# Patient Record
Sex: FEMALE | Race: WHITE | NOT HISPANIC OR LATINO | Employment: OTHER | URBAN - NONMETROPOLITAN AREA
[De-identification: names, ages, dates, MRNs, and addresses within clinical notes are randomized per-mention and may not be internally consistent; named-entity substitution may affect disease eponyms.]

---

## 2017-01-10 ENCOUNTER — HISTORY (OUTPATIENT)
Dept: EMERGENCY MEDICINE | Facility: OTHER | Age: 42
End: 2017-01-10

## 2018-03-01 ENCOUNTER — DOCUMENTATION ONLY (OUTPATIENT)
Dept: FAMILY MEDICINE | Facility: OTHER | Age: 43
End: 2018-03-01

## 2018-03-01 RX ORDER — LEVOTHYROXINE SODIUM 75 UG/1
75 TABLET ORAL
COMMUNITY
Start: 2016-11-04 | End: 2023-11-08

## 2018-03-01 RX ORDER — OMEPRAZOLE 40 MG/1
40 CAPSULE, DELAYED RELEASE ORAL 2 TIMES DAILY
COMMUNITY
Start: 2016-11-04 | End: 2023-11-08

## 2018-04-30 ENCOUNTER — OFFICE VISIT (OUTPATIENT)
Dept: FAMILY MEDICINE | Facility: OTHER | Age: 43
End: 2018-04-30
Attending: PHYSICIAN ASSISTANT
Payer: OTHER GOVERNMENT

## 2018-04-30 VITALS — SYSTOLIC BLOOD PRESSURE: 118 MMHG | DIASTOLIC BLOOD PRESSURE: 86 MMHG | HEART RATE: 69 BPM | OXYGEN SATURATION: 100 %

## 2018-04-30 DIAGNOSIS — R22.9 LOCALIZED SUPERFICIAL SWELLING, MASS, OR LUMP: Primary | ICD-10-CM

## 2018-04-30 PROCEDURE — 99213 OFFICE O/P EST LOW 20 MIN: CPT | Performed by: PHYSICIAN ASSISTANT

## 2018-04-30 RX ORDER — ZOLEDRONIC ACID 5 MG/100ML
5 INJECTION, SOLUTION INTRAVENOUS
Status: ON HOLD | COMMUNITY
End: 2023-11-09

## 2018-04-30 NOTE — PATIENT INSTRUCTIONS
Small 0.5 cm superficial cyst or nodule on volar side of left arm  This does not appear infectious and is asymptomatic  Suggest follow up with PCP for further evaluation if this persists or gets worse  Seek immediate care for redness, warmth, pain, drainage, worsening of symptoms or fever

## 2018-04-30 NOTE — NURSING NOTE
Patient present to clinic today with a small lump under her skin on her left upper arm. It does not hurt. It has been there for a couple of weeks.  [place] CMA..............4/30/2018........3:17 PM

## 2018-04-30 NOTE — MR AVS SNAPSHOT
"              After Visit Summary   4/30/2018    Sara Liriano    MRN: [id number]           Patient Information     Date Of Birth          1975        Visit Information        Provider Department      4/30/2018 3:00 PM Batsheva Fletcher PA [facility]        Today's Diagnoses     Localized superficial swelling, mass, or lump    -  1      Care Instructions    Small 0.5 cm superficial cyst or nodule on volar side of left arm  This does not appear infectious and is asymptomatic  Suggest follow up with PCP for further evaluation if this persists or gets worse  Seek immediate care for redness, warmth, pain, drainage, worsening of symptoms or fever            Follow-ups after your visit        Follow-up notes from your care team     Return if symptoms worsen or fail to improve.      Who to contact     If you have questions or need follow up information about today's clinic visit or your schedule please contact [facility] directly at [phone].  Normal or non-critical lab and imaging results will be communicated to you by [employer]fl[employer], letter or phone within 4 business days after the clinic has received the results. If you do not hear from us within 7 days, please contact the clinic through [employer] or phone. If you have a critical or abnormal lab result, we will notify you by phone as soon as possible.  Submit refill requests through Yippy or call your pharmacy and they will forward the refill request to us. Please allow 3 business days for your refill to be completed.          Additional Information About Your Visit        American[employer]art Information     Yippy lets you send messages to your doctor, view your test results, renew your prescriptions, schedule appointments and more. To sign up, go to [email or web address] . Click on \"Log in\" on the left side of the screen, which will take you to the Welcome page. Then click on \"Sign up Now\" on the right side of the page.     You " will be asked to enter the access code listed below, as well as some personal information. Please follow the directions to create your username and password.     Your access code is: [email or web address]  Expires: 2018  4:04 PM     Your access code will  in 90 days. If you need help or a new code, please call your [facility] or [phone].        Care EveryWhere ID     This is your Care EveryWhere ID. This could be used by other organizations to access your [place] medical records  [id number]        Your Vitals Were     Pulse Pulse Oximetry Breastfeeding?             69 100% No          Blood Pressure from Last 3 Encounters:   18 118/86    Weight from Last 3 Encounters:   No data found for Wt              Today, you had the following     No orders found for display       Primary Care Provider Fax #    Physician No Ref-Primary [phone]       No address on file        Equal Access to Services     [facility]: Hadii errol Jack, waaxda rodrigo, qaybta kaalmada taqueria, alicia yeung . So [facility] [phone].    ATENCIÓN: Si habla español, tiene a fine disposición servicios gratuitos de asistencia lingüística. Llame al [phone].    We comply with applicable federal civil rights laws and [place] laws. We do not discriminate on the basis of race, color, national origin, age, disability, sex, sexual orientation, or gender identity.            Thank you!     Thank you for choosing [facility] hospitals  for your care. Our goal is always to provide you with excellent care. Hearing back from our patients is one way we can continue to improve our services. Please take a few minutes to complete the written survey that you may receive in the mail after your visit with us. Thank you!             Your Updated Medication List - Protect others around you: Learn how to safely use, store and throw away your medicines at www.disposemymeds.org.          This  list is accurate as of 4/30/18  4:04 PM.  Always use your most recent med list.                   Brand Name Dispense Instructions for use Diagnosis    levothyroxine 75 MCG tablet    SYNTHROID/LEVOTHROID     Take 75 mcg by mouth every morning (before breakfast)        MULTIPLE VITAMIN PO           omeprazole 40 MG capsule    priLOSEC     Take 40 mg by mouth 2 times daily        zoledronic Acid 5 MG/100ML Soln infusion    RECLAST     Inject 5 mg into the vein

## 2018-04-30 NOTE — PROGRESS NOTES
HPI:    Sara Liriano is a 43 year old female  who presents to clinic today for evaluation of a small superficial bump on her left arm. She first noticed this about 2 weeks ago. It is not painful or symptomatic in any way. She has a history of cysts. She has a cluster of them on her left breast and has had several biopsied and all were benign.     She does her medical treatment in the [facility]. She has her next yearly appointment in September.   She feels well, denies fever, sweats, chills. Eating and drinking normally, denies unintentional weight loss.     History reviewed. No pertinent past medical history.  History reviewed. No pertinent surgical history.  Social History   Substance Use Topics     Smoking status: Never Smoker     Smokeless tobacco: Never Used     Alcohol use Yes      Comment: Alcoholic Drinks/day: Occassional     Current Outpatient Prescriptions   Medication Sig Dispense Refill     levothyroxine (SYNTHROID/LEVOTHROID) 75 MCG tablet Take 75 mcg by mouth every morning (before breakfast)       MULTIPLE VITAMIN PO        omeprazole (PRILOSEC) 40 MG capsule Take 40 mg by mouth 2 times daily       zoledronic Acid (RECLAST) 5 MG/100ML SOLN infusion Inject 5 mg into the vein       Allergies   Allergen Reactions     Fluoxetine Other (See Comments)     Blurred vision, heart racing, face swelling         Past medical history, past surgical history, current medications and allergies reviewed and accurate to the best of my knowledge.        ROS:  Refer to HPI    /86 (BP Location: Right arm, Patient Position: Sitting, Cuff Size: Child)  Pulse 69  LMP   SpO2 100%  Breastfeeding? No    EXAM:  General Appearance: Well appearing female, appropriate appearance for age. No acute distress  Musculoskeletal:  Left arm exam: Inspection: on volar aspect of arm there is a small nodule. Palpation: nodule is 0.5 cm, moveable, and superficial. No warmth or tenderness.   Neurovascular: normal pulses, sensation to  soft touch  ROM: normal left extremity  Dermatological: no rashes noted of exposed skin  Psychological: normal affect, alert and pleasant      ASSESSMENT/PLAN:  (R22.9) Localized superficial swelling, mass, or lump  (primary encounter diagnosis)      PLAN:  Small 0.5 cm superficial cyst or nodule on volar side of left arm  This does not appear infectious and is asymptomatic  Suggest follow up with PCP for further evaluation if this persists or gets worse  Seek immediate care for redness, warmth, pain, drainage, worsening of symptoms or fever    WARREN Barraza on 4/30/2018 at 4:53 PM

## 2018-05-15 ENCOUNTER — HOSPITAL ENCOUNTER (EMERGENCY)
Facility: OTHER | Age: 43
Discharge: ANOTHER HOSPITAL | End: 2018-05-16
Attending: FAMILY MEDICINE | Admitting: FAMILY MEDICINE
Payer: OTHER GOVERNMENT

## 2018-05-15 DIAGNOSIS — S06.33AA SKULL FRACTURE WITH CEREBRAL CONTUSION, CLOSED, INITIAL ENCOUNTER (H): ICD-10-CM

## 2018-05-15 DIAGNOSIS — R64 CACHECTIC (H): ICD-10-CM

## 2018-05-15 DIAGNOSIS — S02.91XA SKULL FRACTURE WITH CEREBRAL CONTUSION, CLOSED, INITIAL ENCOUNTER (H): ICD-10-CM

## 2018-05-15 DIAGNOSIS — I60.9 SUBARACHNOID HEMORRHAGE (H): ICD-10-CM

## 2018-05-15 DIAGNOSIS — E87.1 HYPONATREMIA: ICD-10-CM

## 2018-05-15 DIAGNOSIS — S09.90XA CLOSED HEAD INJURY, INITIAL ENCOUNTER: ICD-10-CM

## 2018-05-15 DIAGNOSIS — E87.6 HYPOKALEMIA: ICD-10-CM

## 2018-05-15 DIAGNOSIS — E43 SEVERE MALNUTRITION (H): ICD-10-CM

## 2018-05-15 PROCEDURE — 25000128 H RX IP 250 OP 636: Performed by: FAMILY MEDICINE

## 2018-05-15 PROCEDURE — 99291 CRITICAL CARE FIRST HOUR: CPT | Mod: Z6 | Performed by: FAMILY MEDICINE

## 2018-05-15 PROCEDURE — 96376 TX/PRO/DX INJ SAME DRUG ADON: CPT | Performed by: FAMILY MEDICINE

## 2018-05-15 PROCEDURE — 93005 ELECTROCARDIOGRAM TRACING: CPT | Performed by: FAMILY MEDICINE

## 2018-05-15 PROCEDURE — 96375 TX/PRO/DX INJ NEW DRUG ADDON: CPT | Performed by: FAMILY MEDICINE

## 2018-05-15 PROCEDURE — 99291 CRITICAL CARE FIRST HOUR: CPT | Mod: 25 | Performed by: FAMILY MEDICINE

## 2018-05-15 PROCEDURE — 93005 ELECTROCARDIOGRAM TRACING: CPT | Mod: 76 | Performed by: FAMILY MEDICINE

## 2018-05-15 PROCEDURE — 96365 THER/PROPH/DIAG IV INF INIT: CPT | Performed by: FAMILY MEDICINE

## 2018-05-15 RX ADMIN — ROCURONIUM BROMIDE 50 MG: 10 INJECTION, SOLUTION INTRAVENOUS at 23:57

## 2018-05-15 RX ADMIN — SODIUM CHLORIDE: 900 INJECTION, SOLUTION INTRAVENOUS at 23:54

## 2018-05-15 RX ADMIN — MIDAZOLAM 4 MG: 1 INJECTION INTRAMUSCULAR; INTRAVENOUS at 23:55

## 2018-05-15 NOTE — ED AVS SNAPSHOT
Sara Welch #[id number] (CSN: [id number])  (43 year old F)  (Adm: 05/15/18)     [id number]               [facility]: [phone]            Patient Demographics     Patient Name Sex          Age SSN Address Phone    Sara Welch Female 1975 (43 year old) [id number] [facility] [id number] [phone] (Home)      PCP and Center    Primary Care Provider  Phone Center     No primary care provider on file. None         Emergency Contact(s)     None on File      Admission Information     Attending Provider Admitting Provider Admission Type Admission Date/Time    Camron Gerardo MD  Emergency 05/15/18  5989    Discharge Date Hospital Service Auth/Cert Status Service Area     Emergency Medicine [facility]    Unit Room/Bed Admission Status        EMERGENCY DEPT ED02/ED02 Admission (Confirmed)       Admission     Complaint    None      Hospital Account     Name Acct ID Class Status Primary Coverage    Sara Welch [id number] Emergency Open None            Guarantor Account (for Hospital Account #[id number])     Name Relation to Pt Service Area Active? Acct Type    Sara Welch Self FCS Yes Personal/Family    Address Phone          [address] [place] [address] [phone](H)              Coverage Information (for Hospital Account #[id number])     Not on file                                                INTERAGENCY TRANSFER FORM - PHYSICIAN ORDERS   5/15/2018                       [facility]: [phone]            Attending Provider: Camron Gerardo MD     Allergies:  Not on File    Infection:  None   Service:  EMERGENCY ME    Ht:  --   Wt:  --   Admission Wt:  --    BMI:  --   BSA:  --            ED Clinical Impression     Diagnosis Description Comment Added By Time Added    Closed head injury, initial encounter [S09.90XA] Closed head injury, initial encounter [S09.90XA]  Camron Gerardo MD  5/16/2018 12:43 AM      Hospital Problems as of 5/16/2018     None      Non-Hospital Problems as of 5/16/2018     None      Code Status History     This patient does not have a recorded code status. Please follow your organizational policy for patients in this situation.      Current Code Status     This patient does not have a recorded code status. Please follow your organizational policy for patients in this situation.         Medication Review      Notice     You have not been prescribed any medications.                                                INTERAGENCY TRANSFER FORM - NURSING   5/15/2018                       [facility]: [phone]            Attending Provider: Camron Gerardo MD     Allergies:  Not on File    Infection:  None   Service:  EMERGENCY ME    Ht:  --   Wt:  --   Admission Wt:  --    BMI:  --   BSA:  --            Advance Directives        Scanned docmt in ACP Activity?                   Immunizations     None      ASSESSMENT     Discharge Profile Flowsheet     COMMUNICATION ASSESSMENT     Patient's communication style  -- (unable to speak) 05/15/18 2350            Vitals     Vital Signs Flowsheet     VITAL SIGNS     POINT OF CARE TESTING      Temp  96.6  F (35.9  C) 05/15/18 2354   Puncture Site  fingertip 05/16/18 0018    Temp src  Tympanic 05/15/18 2354   Bedside Glucose (mg/dl )   196 mg/dl 05/16/18 0018    Resp  10 05/16/18 0037   RESPIRATORY MONITORING      Heart Rate  114 05/16/18 0037   Respiratory Monitoring (EtCO2)  37 mmHg 05/16/18 0037    Pulse/Heart Rate Source  Monitor 05/15/18 2354   MIRNA COMA SCALE      BP  (!)  129/108 05/16/18 0037   Best Eye Response  3-->(E3) to speech 05/15/18 2354    OXYGEN THERAPY     Best Motor Response  5-->(M5) localizes pain 05/15/18 2354    SpO2  100 % 05/16/18 0037   Best Verbal Response  2-->(V2) incomprehensible speech 05/15/18 2354    PAIN/COMFORT     Russell Coma Scale Score  10 05/15/18 2354    FACES  Pain Rating  0-->no hurt 05/15/18 2354                 Patient Lines/Drains/Airways Status    Active LINES/DRAINS/AIRWAYS     Name: Placement date: Placement time: Site: Days: Last dressing change:    Urethral Catheter 16 fr 18   0010      less than 1     Peripheral IV 18 Left Lower forearm 18   0001   Lower forearm   less than 1     Peripheral IV 18 Right 18   0001      less than 1             Patient Lines/Drains/Airways Status    Active PICC/CVC     None            Intake/Output Detail Report     None      Case Management/Discharge Planning     Case Management/Discharge Planning Flowsheet     ABUSE RISK SCREEN     QUESTION TO PATIENT: Do you feel safe going back to the place where you are living?  patient declined to answer or is unable to answer 05/15/18 2357    QUESTION TO PATIENT:  Has a member of your family or a partner(now or in the past) intimidated, hurt, manipulated, or controlled you in any way?  patient declined to answer or is unable to answer 05/15/18 2357                       [facility]: [phone]            Medication Administration Report for Sara Welch as of 18   Legend:    Given Hold Not Given Due Canceled Entry Other Actions    Time Time (Time) Time  Time-Action       Inactive    Active    Linked        Medications 05/10/18 05/11/18 05/12/18 05/13/18 05/14/18 05/15/18 05/16/18    levETIRAcetam (KEPPRA) 1,000 mg in sodium chloride 0.9 % 100 mL intermittent infusion  Dose: 1,000 mg  Freq: ONCE Route: IV  Start: 18   Admin. Amount: 1,000 mg  Infused Over: 15 Minutes  Administrations Remainin  Volume: 100 mL   Mixture Administration Information:   Medication Type Amount   levETIRAcetam 500 MG/5ML SOLN Medications 1,000 mg   sodium chloride 0.9 % SOLN Base 100 mL                   [ ] 44           midazolam (VERSED) injection 4 mg  Dose: 4 mg  Freq: EVERY 20 MIN PRN Route: IV  PRN Reasons: anxiety,sedation  PRN  Comment: seizures  Start: 05/16/18 0010   Admin Instructions: For ordered doses up to 2.5 mg give IV Push slowly titrated over a minimum of 2 minutes. Dilute each 1mg in 4mL of NS.    Admin. Amount: 4 mg = 4 mL Conc: 1 mg/mL  Last Admin: 05/16/18 0011  Dispense Loc: EMERGENCY DEPARTMENT  Infused Over: 2 Minutes  Volume: 4 mL          2355 (4 mg)-Given        0011 (4 mg)-Given [C]           propofol (DIPRIVAN) infusion  Rate: 1.5-22.5 mL/hr Dose: 5-75 mcg/kg/min  Weight Dosing Info: 50 kg (Order-Specific)  Freq: CONTINUOUS Route: IV  Last Dose: Stopped (05/16/18 0044)  Start: 05/16/18 0020   Admin Instructions: For sedation of mechanically ventilated patients.  For range orders: start at lowest dose ordered. Titrate by 5-10 mcg/kg/min every 5 minutes to achieve the defined goal sedation score. If ordered, consider using bolus doses of propofol for acute agitation before titrating infusion.    Order specific questions:  Population for use? Adult Sedation  Goal Lopez Agitation Sedation Scale (RASS) Score Goal Range -3 Moderate Sedation to -4 Deep Sedation     Admin. Amount: 250-3,750 mcg/min  Last Admin: 05/16/18 0039  Dispense Loc: EMERGENCY DEPARTMENT  Volume: 100 mL   Current Line: Peripheral IV 05/16/18 Left Lower forearm          0021 (5 mcg/kg/min)-New Bag       0039 (10 mcg/kg/min)-New Bag [C]       0044-ED Infusing on Admission/transfer           sodium chloride 0.9% infusion  Rate: 125 mL/hr   Freq: CONTINUOUS Route: IV  Last Dose: Stopped (05/16/18 0043)  Start: 05/16/18 0007   Last Admin: 05/15/18 2354  Dispense Loc: EMERGENCY DEPARTMENT  Volume: 1,000 mL   Current Line: Peripheral IV 05/16/18 Right         2354 ( )-New Bag        0043-ED Infusing on Admission/transfer          Completed Medications  Medications 05/10/18 05/11/18 05/12/18 05/13/18 05/14/18 05/15/18 05/16/18         Dose: 50 mL  Freq: ONCE Route: IV  Start: 05/16/18 0014   End: 05/16/18 0003   Admin Instructions: Vesicant. For ordered  doses up to 25 g, give IV Push undiluted. Give each 5g over 1 minute.    Admin. Amount: 50 mL  Last Admin: 18  Dispense Loc: EMERGENCY DEPARTMENT  Infused Over: 1-5 Minutes  Administrations Remainin  Volume: 50 mL           0002 (50 mL)-Given             Dose: 25 mcg  Freq: ONCE Route: IV  Start: 18   End: 18   Admin Instructions: For ordered doses up to 100 mcg give IV Push undiluted over a minimum of 3-5 minutes.    Admin. Amount: 25 mcg = 0.5 mL Conc: 50 mcg/mL  Last Admin: 18  Dispense Loc: EMERGENCY DEPARTMENT  Administrations Remainin  Volume: 2 mL           8 (25 mcg)-Given             Dose: 50 mg  Freq: ONCE Route: IV  Start: 18   End: 05/15/18 2357   Admin. Amount: 50 mg = 5 mL Conc: 10 mg/mL  Last Admin: 05/15/18 2357  Dispense Loc: [facility]  Administrations Remainin  Volume: 5 mL           (50 mg)-Given                     INTERAGENCY TRANSFER FORM - NOTES (H&P, Discharge Summary, Consults, Procedures, Therapies)   5/15/2018                       [facility]: [phone]            History & Physicals     No notes of this type exist for this encounter.      Discharge Summaries     No notes of this type exist for this encounter.      Consult Notes     No notes of this type exist for this encounter.      Progress Notes - Physician (Notes for yesterday and today)     No notes of this type exist for this encounter.      Procedure Notes     No notes of this type exist for this encounter.      Progress Notes - Therapies (Notes from 18 through 18)     No notes of this type exist for this encounter.                                          INTERAGENCY TRANSFER FORM - LAB / IMAGING / EKG / EMG RESULTS   5/15/2018                       [facility]: [phone]            Unresulted Labs (24h ago through future)    Start       Ordered    18 0006  HCG qualitative urine  (LAB  HCG (Human Chorionic Gonadotropin) Pregnancy Testing PANEL)  STAT,   STAT      05/16/18 0006 05/16/18 0006  UA reflex to Microscopic and Culture  (LAB Urine Testing ADULT PANEL - GH)  ROUTINE,   Routine      05/16/18 0006 05/16/18 0006  Drug of Abuse Screen Urine GH  (Drug of Abuse Screen Urine - GH)  STAT,   STAT      05/16/18 0006         Lab Results - 3 Days      Lactic acid [[id number]] (Abnormal)  Resulted: 05/16/18 0033, Result status: Final result    Ordering provider: Camron Tirado MD  05/16/18 0006 Resulting lab: [facility]    Specimen Information    Type Source Collected On   Blood  05/16/18 0005          Components       Value Reference Range Flag Lab   Lactic Acid 3.3 0.5 - 2.2 mmol/L HH [facility]   Comment:  Critical Value called to and read back by  DR. TIRADO 5.16.18 00:30 MW.              Comprehensive metabolic panel [[id number]] (Abnormal)  Resulted: 05/16/18 0031, Result status: Final result    Ordering provider: Camron Tirado MD  05/16/18 0006 Resulting lab: [facility]    Specimen Information    Type Source Collected On   Blood  05/16/18 0005          Components       Value Reference Range Flag Lab   Sodium 122 134 - 144 mmol/L L [facility]   Potassium 3.1 3.5 - 5.1 mmol/L L [facility]   Chloride 93 98 - 107 mmol/L L [facility]   Carbon Dioxide 21 21 - 31 mmol/L  [facility]   Anion Gap 8 3 - 14 mmol/L  [facility]   Glucose 453 70 - 105 mg/dL H [facility]   Urea Nitrogen 5 7 - 25 mg/dL L [facility]   Creatinine 0.47 0.60 - 1.20 mg/dL L [facility]   GFR Estimate >90 >60 mL/min/1.7m2  [facility]   GFR Estimate If Black >90 >60 mL/min/1.7m2  [facility]   Calcium 7.0 8.6 - 10.3 mg/dL L [facility]   Bilirubin Total 0.5 0.3 - 1.0 mg/dL  [facility]   Albumin 2.3 3.5 - 5.7 g/dL L [facility]   Protein Total 4.1 6.4 - 8.9 g/dL L [facility]   Alkaline Phosphatase 63 34 - 104 U/L  [facility]   ALT 52 7 - 52 U/L  [facility]   AST 71 13 - 39 U/L H  [facility]            Ethanol GH [[id number]]  Resulted: 05/16/18 0031, Result status: Final result    Ordering provider: Camron Gerardo MD  05/16/18 0006 Resulting lab: [facility]    Specimen Information    Type Source Collected On   Blood  05/16/18 0005          Components       Value Reference Range Flag Lab   Ethanol g/dL <0.01 <0.01 %  [facility]            Acetaminophen GH [[id number]]  Resulted: 05/16/18 0031, Result status: Final result    Ordering provider: Camron Gerardo MD  05/16/18 0006 Resulting lab: [facility] [place]    Specimen Information    Type Source Collected On   Blood  05/16/18 0005          Components       Value Reference Range Flag Lab   Acetaminophen <0.2 0.0 - 30.0 ug/mL  [facility]            Salicylate level [[id number]] (Abnormal)  Resulted: 05/16/18 0031, Result status: Final result    Ordering provider: Camron Gerardo MD  05/16/18 0006 Resulting lab: [facility] [place]    Specimen Information    Type Source Collected On   Blood  05/16/18 0005          Components       Value Reference Range Flag Lab   Salicylate Level <0 15 - 30 mg/dL L [facility]   Comment:  Therapeutic:        <20  Anti inflammatory:  15-30              Troponin I [[id number]]  Resulted: 05/16/18 0029, Result status: Final result    Ordering provider: Camron Gerardo MD  05/16/18 0006 Resulting lab: [facility]    Specimen Information    Type Source Collected On   Blood  05/16/18 0005          Components       Value Reference Range Flag Lab   Troponin I ES <0.030 0.000 - 0.034 ug/L  [facility]            Blood gas arterial and oxyhgb [[id number]] (Abnormal)  Resulted: 05/16/18 0019, Result status: Final result    Ordering provider: Camron Gerardo MD  05/16/18 0014 Resulting lab: [facility] [place]    Specimen Information    Type Source Collected On   Blood  05/16/18 0005          Components       Value  Reference Range Flag Lab   pH Arterial 7.43 7.35 - 7.45 pH  [facility]   pCO2 Arterial 29 35 - 45 mm Hg L [facility]   pO2 Arterial 480 83 - 108 mm Hg H [facility]   Bicarbonate Arterial 19 22 - 28 mmol/L L [facility]   FIO2 100   [facility]   Oxyhemoglobin Arterial 97 >95 %  [facility]            INR [[id number]]  Resulted: 05/16/18 0017, Result status: Final result    Ordering provider: Camron Gerardo MD  05/16/18 0006 Resulting lab: [facility]    Specimen Information    Type Source Collected On   Blood  05/16/18 0005          Components       Value Reference Range Flag Lab   INR 1.13 0 - 1.3  [facility]            CBC with platelets differential [[id number]] (Abnormal)  Resulted: 05/16/18 0015, Result status: Final result    Ordering provider: Camron Gerardo MD  05/16/18 0006 Resulting lab: [facility]    Specimen Information    Type Source Collected On   Blood  05/16/18 0005          Components       Value Reference Range Flag Lab   WBC 3.7 4.0 - 11.0 10e9/L L [facility]   RBC Count 2.80 3.8 - 5.2 10e12/L L [facility]   Hemoglobin 10.3 11.7 - 15.7 g/dL L [facility]   Hematocrit 27.9 35.0 - 47.0 % L [facility]    78 - 100 fl  [facility]   MCH 36.8 26.5 - 33.0 pg H [facility]   MCHC 36.9 31.5 - 36.5 g/dL H [facility]   RDW 11.9 10.0 - 15.0 %  [facility]   Platelet Count 162 150 - 450 10e9/L  [facility]   Diff Method Automated Method   [facility]   % Neutrophils 43.8 %  [facility]   % Lymphocytes 48.4 %  [facility]   % Monocytes 4.3 %  [facility]   % Eosinophils 0.3 %  [facility]   % Basophils 1.1 %  [facility]   % Immature Granulocytes 2.1 %  [facility]   Absolute Neutrophil 1.6 1.6 - 8.3 10e9/L  [facility]   Absolute Lymphocytes 1.8 0.8 - 5.3 10e9/L  [facility]   Absolute Monocytes 0.2 0.0 - 1.3 10e9/L  [facility]   Absolute Eosinophils 0.0 0.0 - 0.7 10e9/L  [facility]   Absolute Basophils 0.0 0.0 - 0.2 10e9/L  [facility]   Abs Immature Granulocytes  0.1 0 - 0.4 10e9/L  [facility]            Testing Performed By     Lab - Abbreviation Name Director Address Valid Date Range    8174 - [facility] \[facility]\"" Unknown [address]  [facility] [address] 08/07/15 0948 - Present            Encounter-Level Documents:     There are no encounter-level documents.      Order-Level Documents:     There are no order-level documents.

## 2018-05-16 ENCOUNTER — APPOINTMENT (OUTPATIENT)
Dept: GENERAL RADIOLOGY | Facility: OTHER | Age: 43
End: 2018-05-16
Attending: FAMILY MEDICINE
Payer: OTHER GOVERNMENT

## 2018-05-16 ENCOUNTER — APPOINTMENT (OUTPATIENT)
Dept: CT IMAGING | Facility: OTHER | Age: 43
End: 2018-05-16
Attending: FAMILY MEDICINE
Payer: OTHER GOVERNMENT

## 2018-05-16 ENCOUNTER — TRANSFERRED RECORDS (OUTPATIENT)
Dept: HEALTH INFORMATION MANAGEMENT | Facility: OTHER | Age: 43
End: 2018-05-16

## 2018-05-16 VITALS
RESPIRATION RATE: 10 BRPM | DIASTOLIC BLOOD PRESSURE: 82 MMHG | SYSTOLIC BLOOD PRESSURE: 103 MMHG | OXYGEN SATURATION: 100 % | TEMPERATURE: 96.6 F

## 2018-05-16 DIAGNOSIS — Z53.9 DIAGNOSIS NOT YET DEFINED: Primary | ICD-10-CM

## 2018-05-16 LAB
ABO + RH BLD: NORMAL
ABO + RH BLD: NORMAL
ALBUMIN SERPL-MCNC: 2.3 G/DL (ref 3.5–5.7)
ALBUMIN UR-MCNC: ABNORMAL MG/DL
ALP SERPL-CCNC: 63 U/L (ref 34–104)
ALT SERPL W P-5'-P-CCNC: 52 U/L (ref 7–52)
AMORPH CRY #/AREA URNS HPF: ABNORMAL /HPF
AMPHETAMINES UR QL SCN: NOT DETECTED
ANION GAP SERPL CALCULATED.3IONS-SCNC: 8 MMOL/L (ref 3–14)
APAP SERPL-MCNC: <0.2 UG/ML (ref 0–30)
APPEARANCE UR: CLEAR
AST SERPL W P-5'-P-CCNC: 71 U/L (ref 13–39)
BACTERIA #/AREA URNS HPF: ABNORMAL /HPF
BARBITURATES UR QL: NOT DETECTED
BASOPHILS # BLD AUTO: 0 10E9/L (ref 0–0.2)
BASOPHILS NFR BLD AUTO: 1.1 %
BENZODIAZ UR QL: ABNORMAL
BILIRUB SERPL-MCNC: 0.5 MG/DL (ref 0.3–1)
BILIRUB UR QL STRIP: NEGATIVE
BLD GP AB SCN SERPL QL: NORMAL
BLOOD BANK CMNT PATIENT-IMP: NORMAL
BUN SERPL-MCNC: 5 MG/DL (ref 7–25)
BUPRENORPHINE UR QL: NOT DETECTED NG/ML
CALCIUM SERPL-MCNC: 7 MG/DL (ref 8.6–10.3)
CANNABINOIDS UR QL: NOT DETECTED NG/ML
CHLORIDE SERPL-SCNC: 93 MMOL/L (ref 98–107)
CO2 SERPL-SCNC: 21 MMOL/L (ref 21–31)
COCAINE UR QL: NOT DETECTED
COLOR UR AUTO: YELLOW
CREAT SERPL-MCNC: 0.47 MG/DL (ref 0.6–1.2)
D-METHAMPHET UR QL: NOT DETECTED NG/ML
DIFFERENTIAL METHOD BLD: ABNORMAL
EOSINOPHIL # BLD AUTO: 0 10E9/L (ref 0–0.7)
EOSINOPHIL NFR BLD AUTO: 0.3 %
ERYTHROCYTE [DISTWIDTH] IN BLOOD BY AUTOMATED COUNT: 11.9 % (ref 10–15)
ETHANOL SERPL-MCNC: <0.01 %
GFR SERPL CREATININE-BSD FRML MDRD: >90 ML/MIN/1.7M2
GLUCOSE SERPL-MCNC: 453 MG/DL (ref 70–105)
GLUCOSE UR STRIP-MCNC: 500 MG/DL
HCG UR QL: NEGATIVE
HCO3 BLD-SCNC: 19 MMOL/L (ref 22–28)
HCT VFR BLD AUTO: 27.9 % (ref 35–47)
HGB BLD-MCNC: 10.3 G/DL (ref 11.7–15.7)
HGB UR QL STRIP: NEGATIVE
IMM GRANULOCYTES # BLD: 0.1 10E9/L (ref 0–0.4)
IMM GRANULOCYTES NFR BLD: 2.1 %
INR PPP: 1.13 (ref 0–1.3)
KETONES UR STRIP-MCNC: NEGATIVE MG/DL
LACTATE SERPL-SCNC: 3.3 MMOL/L (ref 0.5–2.2)
LEUKOCYTE ESTERASE UR QL STRIP: NEGATIVE
LYMPHOCYTES # BLD AUTO: 1.8 10E9/L (ref 0.8–5.3)
LYMPHOCYTES NFR BLD AUTO: 48.4 %
MCH RBC QN AUTO: 36.8 PG (ref 26.5–33)
MCHC RBC AUTO-ENTMCNC: 36.9 G/DL (ref 31.5–36.5)
MCV RBC AUTO: 100 FL (ref 78–100)
METHADONE UR QL SCN: NOT DETECTED
MONOCYTES # BLD AUTO: 0.2 10E9/L (ref 0–1.3)
MONOCYTES NFR BLD AUTO: 4.3 %
MUCOUS THREADS #/AREA URNS LPF: PRESENT /LPF
NEUTROPHILS # BLD AUTO: 1.6 10E9/L (ref 1.6–8.3)
NEUTROPHILS NFR BLD AUTO: 43.8 %
NITRATE UR QL: NEGATIVE
NON-SQ EPI CELLS #/AREA URNS LPF: ABNORMAL /LPF
O2/TOTAL GAS SETTING VFR VENT: 100 %
OPIATES UR QL SCN: NOT DETECTED
OXYCODONE UR QL: NOT DETECTED NG/ML
OXYHGB MFR BLD: 97 %
PCO2 BLD: 29 MM HG (ref 35–45)
PCP UR QL SCN: NOT DETECTED
PH BLD: 7.43 PH (ref 7.35–7.45)
PH UR STRIP: 6.5 PH (ref 5–7)
PLATELET # BLD AUTO: 162 10E9/L (ref 150–450)
PO2 BLD: 480 MM HG (ref 83–108)
POTASSIUM SERPL-SCNC: 3.1 MMOL/L (ref 3.5–5.1)
PROPOXYPH UR QL: NOT DETECTED NG/ML
PROT SERPL-MCNC: 4.1 G/DL (ref 6.4–8.9)
RBC # BLD AUTO: 2.8 10E12/L (ref 3.8–5.2)
RBC #/AREA URNS AUTO: ABNORMAL /HPF
SALICYLATES SERPL-MCNC: <0 MG/DL (ref 15–30)
SODIUM SERPL-SCNC: 122 MMOL/L (ref 134–144)
SOURCE: ABNORMAL
SP GR UR STRIP: 1.01 (ref 1–1.03)
SPECIMEN EXP DATE BLD: NORMAL
TRICYCLICS UR QL SCN: NOT DETECTED NG/ML
TROPONIN I SERPL-MCNC: <0.03 UG/L (ref 0–0.03)
UROBILINOGEN UR STRIP-ACNC: 0.2 EU/DL (ref 0.2–1)
WBC # BLD AUTO: 3.7 10E9/L (ref 4–11)
WBC #/AREA URNS AUTO: ABNORMAL /HPF

## 2018-05-16 PROCEDURE — 80307 DRUG TEST PRSMV CHEM ANLYZR: CPT | Performed by: FAMILY MEDICINE

## 2018-05-16 PROCEDURE — 85025 COMPLETE CBC W/AUTO DIFF WBC: CPT | Performed by: FAMILY MEDICINE

## 2018-05-16 PROCEDURE — 70450 CT HEAD/BRAIN W/O DYE: CPT | Mod: TC

## 2018-05-16 PROCEDURE — 72125 CT NECK SPINE W/O DYE: CPT | Mod: TC

## 2018-05-16 PROCEDURE — 86900 BLOOD TYPING SEROLOGIC ABO: CPT | Performed by: FAMILY MEDICINE

## 2018-05-16 PROCEDURE — 36600 WITHDRAWAL OF ARTERIAL BLOOD: CPT | Performed by: FAMILY MEDICINE

## 2018-05-16 PROCEDURE — 81001 URINALYSIS AUTO W/SCOPE: CPT | Performed by: FAMILY MEDICINE

## 2018-05-16 PROCEDURE — 80320 DRUG SCREEN QUANTALCOHOLS: CPT | Performed by: FAMILY MEDICINE

## 2018-05-16 PROCEDURE — 40000275 ZZH STATISTIC RCP TIME EA 10 MIN

## 2018-05-16 PROCEDURE — 82805 BLOOD GASES W/O2 SATURATION: CPT | Performed by: FAMILY MEDICINE

## 2018-05-16 PROCEDURE — 81025 URINE PREGNANCY TEST: CPT | Performed by: FAMILY MEDICINE

## 2018-05-16 PROCEDURE — 80329 ANALGESICS NON-OPIOID 1 OR 2: CPT | Performed by: FAMILY MEDICINE

## 2018-05-16 PROCEDURE — 86850 RBC ANTIBODY SCREEN: CPT | Performed by: FAMILY MEDICINE

## 2018-05-16 PROCEDURE — 80053 COMPREHEN METABOLIC PANEL: CPT | Performed by: FAMILY MEDICINE

## 2018-05-16 PROCEDURE — 25000128 H RX IP 250 OP 636: Performed by: FAMILY MEDICINE

## 2018-05-16 PROCEDURE — 40000986 XR CHEST PORT 1 VW

## 2018-05-16 PROCEDURE — 93010 ELECTROCARDIOGRAM REPORT: CPT | Mod: 76 | Performed by: INTERNAL MEDICINE

## 2018-05-16 PROCEDURE — 86901 BLOOD TYPING SEROLOGIC RH(D): CPT | Performed by: FAMILY MEDICINE

## 2018-05-16 PROCEDURE — 85610 PROTHROMBIN TIME: CPT | Performed by: FAMILY MEDICINE

## 2018-05-16 PROCEDURE — 83605 ASSAY OF LACTIC ACID: CPT | Performed by: FAMILY MEDICINE

## 2018-05-16 PROCEDURE — 25800025 ZZH RX 258: Performed by: FAMILY MEDICINE

## 2018-05-16 PROCEDURE — 84484 ASSAY OF TROPONIN QUANT: CPT | Performed by: FAMILY MEDICINE

## 2018-05-16 RX ORDER — FENTANYL CITRATE 50 UG/ML
25 INJECTION, SOLUTION INTRAMUSCULAR; INTRAVENOUS ONCE
Status: COMPLETED | OUTPATIENT
Start: 2018-05-16 | End: 2018-05-16

## 2018-05-16 RX ORDER — DEXTROSE MONOHYDRATE 25 G/50ML
50 INJECTION, SOLUTION INTRAVENOUS ONCE
Status: COMPLETED | OUTPATIENT
Start: 2018-05-16 | End: 2018-05-16

## 2018-05-16 RX ORDER — SODIUM CHLORIDE 9 MG/ML
INJECTION, SOLUTION INTRAVENOUS CONTINUOUS
Status: DISCONTINUED | OUTPATIENT
Start: 2018-05-16 | End: 2018-05-16 | Stop reason: HOSPADM

## 2018-05-16 RX ORDER — PROPOFOL 10 MG/ML
5-75 INJECTION, EMULSION INTRAVENOUS CONTINUOUS
Status: DISCONTINUED | OUTPATIENT
Start: 2018-05-16 | End: 2018-05-16 | Stop reason: HOSPADM

## 2018-05-16 RX ADMIN — PROPOFOL 5 MCG/KG/MIN: 10 INJECTION, EMULSION INTRAVENOUS at 00:21

## 2018-05-16 RX ADMIN — MIDAZOLAM 4 MG: 1 INJECTION INTRAMUSCULAR; INTRAVENOUS at 00:11

## 2018-05-16 RX ADMIN — FENTANYL CITRATE 25 MCG: 50 INJECTION, SOLUTION INTRAMUSCULAR; INTRAVENOUS at 00:18

## 2018-05-16 RX ADMIN — PROPOFOL 10 MCG/KG/MIN: 10 INJECTION, EMULSION INTRAVENOUS at 00:39

## 2018-05-16 RX ADMIN — DEXTROSE MONOHYDRATE 50 ML: 25 INJECTION, SOLUTION INTRAVENOUS at 00:02
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
## 2018-05-16 NOTE — ED NOTES
Patient log rolled, no obvious injuries noted on back side. Blood from mouth when rolling patient. Suctioned by RT.

## 2018-05-16 NOTE — PROGRESS NOTES
Intubated by Dr Gerardo with size 7 ET tube, secured at 23 cm at teeth, chest xray taken and provider wanted ET tube pulled back one cm.   Secured 22 cm at teeth.  Color metric change and ETCO2 applied.  Assisted ventilations to CT scan without incident.  Suctioned once through ET tube for scant bloody secretions and suctioned oral for large amount bloody secretions.

## 2023-08-29 ENCOUNTER — APPOINTMENT (OUTPATIENT)
Dept: GENERAL RADIOLOGY | Facility: OTHER | Age: 48
End: 2023-08-29
Attending: FAMILY MEDICINE
Payer: OTHER GOVERNMENT

## 2023-08-29 ENCOUNTER — HOSPITAL ENCOUNTER (EMERGENCY)
Facility: OTHER | Age: 48
Discharge: HOME | End: 2023-08-29
Attending: FAMILY MEDICINE | Admitting: FAMILY MEDICINE
Payer: OTHER GOVERNMENT

## 2023-08-29 VITALS
SYSTOLIC BLOOD PRESSURE: 101 MMHG | OXYGEN SATURATION: 97 % | HEART RATE: 89 BPM | DIASTOLIC BLOOD PRESSURE: 75 MMHG | TEMPERATURE: 96.9 F | RESPIRATION RATE: 18 BRPM

## 2023-08-29 DIAGNOSIS — S61.012A THUMB LACERATION, LEFT, INITIAL ENCOUNTER: ICD-10-CM

## 2023-08-29 PROCEDURE — 73140 X-RAY EXAM OF FINGER(S): CPT | Mod: TC,LT

## 2023-08-29 PROCEDURE — 90471 IMMUNIZATION ADMIN: CPT | Performed by: FAMILY MEDICINE

## 2023-08-29 PROCEDURE — 90715 TDAP VACCINE 7 YRS/> IM: CPT | Performed by: FAMILY MEDICINE

## 2023-08-29 PROCEDURE — 250N000011 HC RX IP 250 OP 636: Performed by: FAMILY MEDICINE

## 2023-08-29 PROCEDURE — 99207 PR NO CHARGE LOS: CPT | Performed by: FAMILY MEDICINE

## 2023-08-29 PROCEDURE — 250N000009 HC RX 250: Performed by: FAMILY MEDICINE

## 2023-08-29 PROCEDURE — 99283 EMERGENCY DEPT VISIT LOW MDM: CPT | Mod: 25 | Performed by: FAMILY MEDICINE

## 2023-08-29 PROCEDURE — 12001 RPR S/N/AX/GEN/TRNK 2.5CM/<: CPT | Performed by: FAMILY MEDICINE

## 2023-08-29 RX ORDER — LIDOCAINE HYDROCHLORIDE 10 MG/ML
10 INJECTION, SOLUTION INFILTRATION; PERINEURAL ONCE
Status: COMPLETED | OUTPATIENT
Start: 2023-08-29 | End: 2023-08-29

## 2023-08-29 RX ADMIN — CLOSTRIDIUM TETANI TOXOID ANTIGEN (FORMALDEHYDE INACTIVATED), CORYNEBACTERIUM DIPHTHERIAE TOXOID ANTIGEN (FORMALDEHYDE INACTIVATED), BORDETELLA PERTUSSIS TOXOID ANTIGEN (GLUTARALDEHYDE INACTIVATED), BORDETELLA PERTUSSIS FILAMENTOUS HEMAGGLUTININ ANTIGEN (FORMALDEHYDE INACTIVATED), BORDETELLA PERTUSSIS PERTACTIN ANTIGEN, AND BORDETELLA PERTUSSIS FIMBRIAE 2/3 ANTIGEN 0.5 ML: 5; 2; 2.5; 5; 3; 5 INJECTION, SUSPENSION INTRAMUSCULAR at 06:31

## 2023-08-29 RX ADMIN — LIDOCAINE HYDROCHLORIDE 10 ML: 10 INJECTION, SOLUTION INFILTRATION; PERINEURAL at 07:47

## 2023-08-29 RX ADMIN — Medication: at 06:32

## 2023-08-29 ASSESSMENT — ACTIVITIES OF DAILY LIVING (ADL): ADLS_ACUITY_SCORE: 35

## 2023-08-29 ASSESSMENT — ENCOUNTER SYMPTOMS: WOUND: 1

## 2023-08-29 NOTE — ED TRIAGE NOTES
Pt cut L thumb with a kitchen knife while cutting cabbage. Pt states this happened around 1900

## 2023-08-29 NOTE — DISCHARGE INSTRUCTIONS
We used sutures to close this wound today.  Here are care instructions for you:    1. Please do not use any topical antibiotic on this. The skin has a balance of bacteria and yeast that is neccesary for skin health and healing, and we do not want to affect this. Please use vaseline and a band-aid on the repair to keep it soft as this heals.    2. Keep the area clean and dry for the next 24 hours.  After that it is okay to get the site wet from showering or bathing.  I recommend that you avoid swimming or using a hot tub until the sutures are out.    3. The area around the sutures may start to get red, and that is not a sign of infection in most cases.  The skin is reacting to the sutures and the redness will go away once the sutures are taken out.    4. Plan to return to get the sutures out in 7 days.  You can have this done in a local clinic or you can do this at home.    Skin takes a full twelve months to remodel, but usually after about three months you can see what you have for life.  Please treat the wound gently and keep it covered from the sun as it heals.    Tetanus status: we did update that    Oral antibiotics: we did send you home with Augmentin. Take this twice a day until gone    Thank you for choosing our Emergency Department for your care.     You may receive a phone call or letter for a survey about your care in our ED.  Please complete this as this is how we improve care for our patients.     If you have any questions after leaving the ED you can call or text me on my cell phone at [phone] and I will get back to you at some point. This does not mean that I am on call and if you are not doing well please return to the ED.     Sincerely,    Dr Bakari Su M.D.

## 2023-08-29 NOTE — ED PROVIDER NOTES
History     Chief Complaint   Patient presents with    Laceration     Here with her mom    The history is provided by the patient and a parent.     Sara Liriano is a 48 year old female who cut her left thumb last night at about 8 PM. She was cutting up cabbage. No other injury. She tried to wrap it up but this morning it was still bleeding.     Her last tetanus was January 2017.    Allergies:  Allergies   Allergen Reactions    Fluoxetine Other (See Comments)     Blurred vision, heart racing, face swelling       Problem List:    There are no problems to display for this patient.       Past Medical History:    History reviewed. No pertinent past medical history.    Past Surgical History:    History reviewed. No pertinent surgical history.    Family History:    History reviewed. No pertinent family history.    Social History:  Marital Status:  Single [1]  Social History     Tobacco Use    Smoking status: Never    Smokeless tobacco: Never   Substance Use Topics    Alcohol use: Yes     Comment: Alcoholic Drinks/day: Occassional    Drug use: No     Comment: Drug use: No        Medications:    amoxicillin-clavulanate (AUGMENTIN) 875-125 MG tablet  levothyroxine (SYNTHROID/LEVOTHROID) 75 MCG tablet  MULTIPLE VITAMIN PO  omeprazole (PRILOSEC) 40 MG capsule  zoledronic Acid (RECLAST) 5 MG/100ML SOLN infusion      Review of Systems   Skin:  Positive for wound.   All other systems reviewed and are negative.      Physical Exam   BP: 101/75  Pulse: 89  Temp: 96.9  F (36.1  C)  Resp: 18  SpO2: 97 %      Physical Exam  Vitals and nursing note reviewed.   Constitutional:       General: She is not in acute distress.     Appearance: Normal appearance. She is not ill-appearing, toxic-appearing or diaphoretic.   Skin:     Comments: She has a 2 cm, C-shaped cut over the dorsum of the DIP joint of the left thumb.   Neurological:      Mental Status: She is alert.         Results for orders placed or performed during the hospital  encounter of 08/29/23 (from the past 24 hour(s))   XR Finger Left G/E 2 Views    Narrative    PROCEDURE INFORMATION:   Exam: XR Left Finger(s)   Exam date and time: 8/29/2023 6:51 AM   Age: 48 years old   Clinical indication: Injury or trauma; Other: Laceration; Finger; Left; Thumb;   Additional info: Laceration over the dip     TECHNIQUE:   Imaging protocol: Radiologic exam of the left fingers.   Views: Minimum 2 views.     COMPARISON:   No relevant prior studies available.     FINDINGS:   Bones/joints: There is no evidence of acute fracture or dislocation. No   significant narrowing of the joint spaces. No lytic or blastic lesions.   Soft tissues: There is soft tissue swelling appreciated. No radiopaque foreign   body within the soft tissues.       Impression    IMPRESSION:   No acute ossific findings appreciated.     THIS DOCUMENT HAS BEEN ELECTRONICALLY SIGNED BY ELVI STRAUSS MD       Medications   lido-EPINEPHrine-tetracaine (LET) topical gel GEL ( Topical $Given 8/29/23 0632)   Tdap (tetanus-diphtheria-acell pertussis) (ADACEL) injection 0.5 mL (0.5 mLs Intramuscular $Given 8/29/23 0631)   lidocaine 1 % injection 10 mL (10 mLs Intradermal $Given 8/29/23 0706)     The laceration was measured to be 2 cm. in length.  For analgesia, LET and 3 mL of 1 % Lidocaine was used. The wound was irrigated with saline. I inspected for foreign body and for tendon or vascular damage, and there is none.  Exam of NVM function prior to repair shows normal function. The wound edges appear even and intact, and did not  need revision. The wound was closed with 4-0 Ethilon. Exam of the NVM function after the repair showed normal function.     The patient tolerated the procedure well.    These sutures need to come out in 7 days.  We discussed oral antibiotics and I recommend Augmentin due to proximity to the DIP joint and time since injury.  Tetanus status: we did update that      Assessments & Plan (with Medical Decision  Making)  Sara Liriano is a 48 year old female who cut her left thumb last night at about 8 PM. She was cutting up cabbage. No other injury. She tried to wrap it up but this morning it was still bleeding. Her last tetanus was January 2017. VS in the ED /75   Pulse 89   Temp 96.9  F (36.1  C) (Tympanic)   Resp 18   SpO2 97%   Exam shows a 2 cm C-shaped laceration on the dorsum of the DIP of the left thumb. Xray negative.  This was repaired as above. We did update her tetanus. We will get her home on Augmentin.      I have reviewed the nursing notes.    I have reviewed the findings, diagnosis, plan and need for follow up with the patient.     Medical Decision Making  The patient's presentation was of low complexity (an acute and uncomplicated illness or injury).    The patient's evaluation involved:  an assessment requiring an independent historian (see separate area of note for details)  review of 1 test result(s) ordered prior to this encounter (see separate area of note for details)    The patient's management necessitated moderate risk (prescription drug management including medications given in the ED) and moderate risk (a decision regarding minor procedure (laceration repair) with risk factors of none).      New Prescriptions    AMOXICILLIN-CLAVULANATE (AUGMENTIN) 875-125 MG TABLET    Take 1 tablet by mouth 2 times daily       Final diagnoses:   Thumb laceration, left, initial encounter - 2 cm laceration       8/29/2023   [facility] AND [facility], Som Hilton MD  08/29/23 8998

## 2023-08-30 ENCOUNTER — TELEPHONE (OUTPATIENT)
Dept: FAMILY MEDICINE | Facility: OTHER | Age: 48
End: 2023-08-30
Payer: OTHER GOVERNMENT

## 2023-08-30 NOTE — TELEPHONE ENCOUNTER
Per Dr. Mills:  If she has had amoxicillin in the past, this is basically the same med. It is unusual to cause sugars that low and makes me think there is something more going on (since she is not on meds to lower it). She can come back in if worried, or take another dose since none of these symptoms are consistent with a severe allergic reaction, or we can send in an alternative that is not as effective.       Call to patient. Notified of response above.   She will continue to take and monitor her sugar levels if they feel low. Educated on preventing low blood sugars.  Tammy Carr RN on 8/30/2023 at 10:56 AM

## 2023-08-30 NOTE — TELEPHONE ENCOUNTER
Patient called and stated she was dizzy, had trouble walking, and her blood sugar went from 90 to 50 yesterday after taking augmentin that she got at [facility] ER. Patient did not take her other dose this morning.

## 2023-08-30 NOTE — TELEPHONE ENCOUNTER
Call to patient and verified name and date of birth. She states she was in the ED last night for a thumb laceration. Started on Augmentin and took two doses of that last night. She states last night she started experiencing weakness, low blood sugar and dizziness after taking the pills.  Waking up this morning she feels back to her baseline. Has not taken another dose.     She wonders if she can switch antibiotics or if she should stop taking them all together.   She notes her stitches look well, no redness or drainage at the site, no pain.    She is not an established patient here. Routing to Dr. Mills to advise per management. [facility] pharmacy.    Tammy Carr RN on 8/30/2023 at 9:12 AM

## 2023-09-05 ENCOUNTER — ALLIED HEALTH/NURSE VISIT (OUTPATIENT)
Dept: FAMILY MEDICINE | Facility: OTHER | Age: 48
End: 2023-09-05
Payer: OTHER GOVERNMENT

## 2023-09-05 DIAGNOSIS — Z48.02 ENCOUNTER FOR REMOVAL OF SUTURES: Primary | ICD-10-CM

## 2023-09-05 NOTE — PROGRESS NOTES
Sara JOSEY Liriano presents to the clinic today for removal of sutures.  The patient has had the sutures and suture in place for 7 days.  There has been no history of infection or drainage, well approximated. 1 suture is seen located on the left thumb.  The wound is healing well with no signs of infection.  Tetanus status is up to date.   Suture was easily removed today. Two steri strips placed. Routine wound care discussed.  The patient will follow up as needed.    Janet Burns RN on 9/5/2023 at 9:44 AM

## 2023-09-13 ENCOUNTER — OFFICE VISIT (OUTPATIENT)
Dept: FAMILY MEDICINE | Facility: OTHER | Age: 48
End: 2023-09-13
Attending: NURSE PRACTITIONER
Payer: OTHER GOVERNMENT

## 2023-09-13 VITALS
RESPIRATION RATE: 20 BRPM | DIASTOLIC BLOOD PRESSURE: 74 MMHG | WEIGHT: 97.8 LBS | HEART RATE: 86 BPM | SYSTOLIC BLOOD PRESSURE: 104 MMHG | HEIGHT: 66 IN | OXYGEN SATURATION: 94 % | BODY MASS INDEX: 15.72 KG/M2 | TEMPERATURE: 98.7 F

## 2023-09-13 DIAGNOSIS — H66.001 NON-RECURRENT ACUTE SUPPURATIVE OTITIS MEDIA OF RIGHT EAR WITHOUT SPONTANEOUS RUPTURE OF TYMPANIC MEMBRANE: Primary | ICD-10-CM

## 2023-09-13 PROCEDURE — 99213 OFFICE O/P EST LOW 20 MIN: CPT

## 2023-09-13 RX ORDER — CEFDINIR 300 MG/1
300 CAPSULE ORAL 2 TIMES DAILY
Qty: 10 CAPSULE | Refills: 0 | Status: SHIPPED | OUTPATIENT
Start: 2023-09-13 | End: 2023-09-18

## 2023-09-13 RX ORDER — LAMOTRIGINE 200 MG/1
TABLET ORAL
COMMUNITY
Start: 2023-08-31 | End: 2023-11-08

## 2023-09-13 RX ORDER — FEEDER CONTAINER WITH PUMP SET
EACH MISCELLANEOUS
Status: ON HOLD | COMMUNITY
End: 2023-11-09

## 2023-09-13 RX ORDER — LACOSAMIDE 100 MG/1
TABLET ORAL
COMMUNITY
Start: 2023-08-31 | End: 2023-11-08

## 2023-09-13 RX ORDER — OLANZAPINE 5 MG/1
TABLET ORAL
Status: ON HOLD | COMMUNITY
Start: 2022-11-22 | End: 2023-11-09

## 2023-09-13 RX ORDER — IBANDRONATE SODIUM 150 MG/1
150 TABLET, FILM COATED ORAL
COMMUNITY
End: 2024-02-08

## 2023-09-13 RX ORDER — CYANOCOBALAMIN/FOLIC AC/VIT B6 1-2.5-25MG
1 TABLET ORAL DAILY
COMMUNITY
End: 2024-06-12

## 2023-09-13 ASSESSMENT — PAIN SCALES - GENERAL: PAINLEVEL: SEVERE PAIN (6)

## 2023-09-13 NOTE — PROGRESS NOTES
ASSESSMENT/PLAN:    (H66.001) Non-recurrent acute suppurative otitis media of right ear without spontaneous rupture of tympanic membrane  (primary encounter diagnosis)  Comment: Patient with two weeks of right sided otalgia which has worsened today.  She denies any fevers or chills.  On exam she is afebrile, right TM with mild bulging and purulence.  Left TM with mild effusion.  We will treat for the mild ear infection with a course of antibiotics because pain has been ongoing.  She has an amoxicillin allergy so we will opt for cefdinir.  Plan: cefdinir (OMNICEF) 300 MG capsule  You have an ear infection (acute otitis media).     Please take your antibiotics as ordered. Complete the full dose even if you are feeling better. You may take your antibiotics with food.     You may take a daily probiotic while on antibiotics.    Follow up if symptoms are worsening or if symptoms are not improving within 2 days of starting antibiotics.     Discussed warning signs/symptoms indicative of need to f/u    Follow up if symptoms persist or worsen or concerns    I have reviewed the nursing notes.  I have reviewed the findings, diagnosis, plan and need for follow up with the patient.    I explained my diagnostic considerations and recommendations to the patient, who voiced understanding and agreement with the treatment plan. All questions were answered. We discussed potential side effects of any prescribed or recommended therapies, as well as expectations for response to treatments.    GIOVANNA MUIR CNP  9/13/2023  1:42 PM    HPI:    Sara Liriano is a 48 year old female  who presents to [facility] today for concerns of right-sided otalgia.    This started two weeks ago. No rhinorrhea. No fever/chills. No decreased hearing. She has been flushing it at home. Taking tylenol for pain. Pain is intermittent but today it is worse and the pain is constant which is why she came in.     Allergy to amoxicillin and  "fluoxetine.    No PCP on file.    No past medical history on file.  No past surgical history on file.  Social History     Tobacco Use    Smoking status: Never    Smokeless tobacco: Never   Substance Use Topics    Alcohol use: Yes     Comment: Alcoholic Drinks/day: Occassional     Current Outpatient Medications   Medication Sig Dispense Refill    Folic Acid-Vit B6-Vit B12 (FOLBEE) 2.5-25-1 MG TABS Take 1 tablet by mouth daily      IBANdronate (BONIVA) 150 MG tablet Take 150 mg by mouth every 30 days      Lacosamide (VIMPAT) 100 MG TABS tablet       lamoTRIgine (LAMICTAL) 200 MG tablet       levothyroxine (SYNTHROID/LEVOTHROID) 75 MCG tablet Take 75 mcg by mouth every morning (before breakfast)      MULTIPLE VITAMIN PO       Nutritional Supplements (ENSURE HIGH PROTEIN) Take  by mouth.      OLANZapine (ZYPREXA) 5 MG tablet       omeprazole (PRILOSEC) 40 MG capsule Take 40 mg by mouth 2 times daily      zoledronic Acid (RECLAST) 5 MG/100ML SOLN infusion Inject 5 mg into the vein      amoxicillin-clavulanate (AUGMENTIN) 875-125 MG tablet Take 1 tablet by mouth 2 times daily (Patient not taking: Reported on 9/13/2023) 20 tablet 0     Allergies   Allergen Reactions    Fluoxetine Other (See Comments) and Swelling     Blurred vision, heart racing, face swelling    Amoxicillin Other (See Comments)     \"Thrush\"     Past medical history, past surgical history, current medications and allergies reviewed and accurate to the best of my knowledge.      ROS:  Refer to HPI    /74 (BP Location: Left arm, Patient Position: Sitting, Cuff Size: Child)   Pulse 86   Temp 98.7  F (37.1  C) (Tympanic)   Resp 20   Ht 1.676 m (5' 6\")   Wt 44.4 kg (97 lb 12.8 oz)   SpO2 94%   BMI 15.79 kg/m      EXAM:  General Appearance: Well appearing 48 year old female, appropriate appearance for age. No acute distress   Ears: Left TM intact, translucent with bony landmarks appreciated, no erythema, mild effusion, no bulging, no purulence.  " Right TM intact, with mild bulging and purulence.  Left auditory canal clear.  Right auditory canal clear.  Normal external ears, non tender.  Eyes: conjunctivae normal without erythema or irritation, corneas clear, no drainage or crusting, no eyelid swelling, pupils equal   Oropharynx: moist mucous membranes, posterior pharynx without erythema, no exudates or petechiae, no post nasal drip seen, no trismus, voice clear.    Sinuses:  No sinus tenderness upon palpation of the frontal or maxillary sinuses  Nose:  Bilateral nares: no erythema, no edema, no drainage or congestion   Neck: supple without adenopathy  Respiratory: normal chest wall and respirations.  Normal effort.  Clear to auscultation bilaterally, no wheezing, crackles or rhonchi.  No increased work of breathing.  No cough appreciated.  Cardiac: RRR with no murmurs  Musculoskeletal:  Equal movement of bilateral upper extremities.  Equal movement of bilateral lower extremities.  Normal gait.    Dermatological: no rashes noted of exposed skin  Neuro: Alert and oriented to person, place, and time.  Cranial nerves II-XII grossly intact with no focal or lateralizing deficits.  Muscle tone normal.  Gait normal. No tremor.   Psychological: normal affect, alert, oriented, and pleasant.

## 2023-09-13 NOTE — NURSING NOTE
"Chief Complaint   Patient presents with    Ear Problem     Right ear x 2 weeks     Patient tx with ear flush without relief    Initial /74 (BP Location: Left arm, Patient Position: Sitting, Cuff Size: Child)   Pulse 86   Temp 98.7  F (37.1  C) (Tympanic)   Resp 20   Ht 1.676 m (5' 6\")   Wt 44.4 kg (97 lb 12.8 oz)   SpO2 94%   BMI 15.79 kg/m   Estimated body mass index is 15.79 kg/m  as calculated from the following:    Height as of this encounter: 1.676 m (5' 6\").    Weight as of this encounter: 44.4 kg (97 lb 12.8 oz).     Advance Care Directive on file? no    FOOD SECURITY SCREENING QUESTIONS:    The next two questions are to help us understand your food security.  If you are feeling you need any assistance in this area, we have resources available to support you today.    Hunger Vital Signs:  Within the past 12 months we worried whether our food would run out before we got money to buy more. Never  Within the past 12 months the food we bought just didn't last and we didn't have money to get more. Never  Nany Terrell LPN,ARMANDO on 9/13/2023 at 1:41 PM      Nany Terrell LPN     "

## 2023-09-19 ENCOUNTER — OFFICE VISIT (OUTPATIENT)
Dept: FAMILY MEDICINE | Facility: OTHER | Age: 48
End: 2023-09-19
Attending: NURSE PRACTITIONER
Payer: OTHER GOVERNMENT

## 2023-09-19 VITALS
HEART RATE: 67 BPM | RESPIRATION RATE: 12 BRPM | WEIGHT: 97.8 LBS | DIASTOLIC BLOOD PRESSURE: 73 MMHG | BODY MASS INDEX: 15.72 KG/M2 | SYSTOLIC BLOOD PRESSURE: 109 MMHG | TEMPERATURE: 98 F | HEIGHT: 66 IN | OXYGEN SATURATION: 100 %

## 2023-09-19 DIAGNOSIS — Z86.69 MIDDLE EAR INFECTION RESOLVED: ICD-10-CM

## 2023-09-19 DIAGNOSIS — H65.91 MIDDLE EAR EFFUSION, RIGHT: Primary | ICD-10-CM

## 2023-09-19 PROCEDURE — 99213 OFFICE O/P EST LOW 20 MIN: CPT | Performed by: NURSE PRACTITIONER

## 2023-09-19 ASSESSMENT — PAIN SCALES - GENERAL: PAINLEVEL: MODERATE PAIN (5)

## 2023-09-19 NOTE — PROGRESS NOTES
ASSESSMENT/PLAN:    I have reviewed the nursing notes.  I have reviewed the findings, diagnosis, plan and need for follow up with the patient.    1. Middle ear effusion, right  2. Middle ear infection resolved  -daily loratadine 10 mg recommended and daily flonase for middle ear effusion. Complete resolution on AOM. No additional antibiotics are indicated today.     Discussed warning signs/symptoms indicative of need to f/u    Follow up if symptoms persist or worsen or concerns    I explained my diagnostic considerations and recommendations to the patient, who voiced understanding and agreement with the treatment plan. All questions were answered. We discussed potential side effects of any prescribed or recommended therapies, as well as expectations for response to treatments.    Katelyn Maxwell NP  9/19/2023  2:52 PM    HPI:  Sara Liriano is a 48 year old female who presents to [facility] today for concerns of ear ache follow up. She was in last Wednesday and was prescribed cefdinir. She says her symptoms got a little better, but still experiences occasional popping and pain in both ears. Later in the day, slight discomfort. No fevers. No new pain.     ROS otherwise negative.     No past medical history on file.  No past surgical history on file.  Social History     Tobacco Use    Smoking status: Never    Smokeless tobacco: Never   Substance Use Topics    Alcohol use: Yes     Comment: Alcoholic Drinks/day: Occassional     Current Outpatient Medications   Medication Sig Dispense Refill    Folic Acid-Vit B6-Vit B12 (FOLBEE) 2.5-25-1 MG TABS Take 1 tablet by mouth daily      IBANdronate (BONIVA) 150 MG tablet Take 150 mg by mouth every 30 days      Lacosamide (VIMPAT) 100 MG TABS tablet       lamoTRIgine (LAMICTAL) 200 MG tablet       levothyroxine (SYNTHROID/LEVOTHROID) 75 MCG tablet Take 75 mcg by mouth every morning (before breakfast)      MULTIPLE VITAMIN PO       Nutritional Supplements (ENSURE HIGH  "PROTEIN) Take  by mouth.      OLANZapine (ZYPREXA) 5 MG tablet       omeprazole (PRILOSEC) 40 MG capsule Take 40 mg by mouth 2 times daily      zoledronic Acid (RECLAST) 5 MG/100ML SOLN infusion Inject 5 mg into the vein      amoxicillin-clavulanate (AUGMENTIN) 875-125 MG tablet Take 1 tablet by mouth 2 times daily (Patient not taking: Reported on 9/13/2023) 20 tablet 0     Allergies   Allergen Reactions    Fluoxetine Other (See Comments) and Swelling     Blurred vision, heart racing, face swelling    Amoxicillin Other (See Comments)     \"Thrush\"     Past medical history, past surgical history, current medications and allergies reviewed and accurate to the best of my knowledge.      ROS:  Refer to HPI    /73   Pulse 67   Temp 98  F (36.7  C) (Temporal)   Resp 12   Ht 1.676 m (5' 6\")   Wt 44.4 kg (97 lb 12.8 oz)   SpO2 100%   BMI 15.79 kg/m      EXAM:  General Appearance: Well appearing 48 year old female, appropriate appearance for age. No acute distress   Ears: Left TM intact, translucent with bony landmarks appreciated, no erythema, no effusion, no bulging, no purulence.  Right TM intact, translucent with bony landmarks appreciated, no erythema, + serous effusion, no bulging, no purulence.  Left auditory canal clear.  Right auditory canal clear.  Normal external ears, non tender.  Respiratory:  No increased work of breathing.  No cough appreciated.  Dermatological: dorsal surface of left hand: there is a healed linear laceration without evidence of infection (no swelling, drainage, warmth, or tenderness). No erythema.   Neuro: Alert and oriented to person, place, and time.  Psychological: normal affect, alert, oriented, and pleasant.   "

## 2023-09-19 NOTE — PATIENT INSTRUCTIONS
Ear infection has completely resolved.     Recommend once daily nasal spray (FLONASE) and an oral antihistamin e- loratadine (claritin) 10 mg tablet once daily.     It may take 10-12 weeks for the fluid to completely drain. This is common following middle ear infection.  (MIDDLE EAR EFFUSION)

## 2023-09-19 NOTE — NURSING NOTE
"Chief Complaint   Patient presents with    Ear Problem     Pain in both ears   Patient presents to clinic for an ear ache follow up. She was in last Wednesday and was prescribed CEFDINIR. She says her symptoms got a little better, but still experiences occasional popping and pain in both ears. She also would like a cut on her hand to be looked at.      Katy Palacios on 9/19/2023 at 2:34 PM        Initial /73   Pulse 67   Temp 98  F (36.7  C) (Temporal)   Resp 12   Ht 1.676 m (5' 6\")   Wt 44.4 kg (97 lb 12.8 oz)   SpO2 100%   BMI 15.79 kg/m   Estimated body mass index is 15.79 kg/m  as calculated from the following:    Height as of this encounter: 1.676 m (5' 6\").    Weight as of this encounter: 44.4 kg (97 lb 12.8 oz).       FOOD SECURITY SCREENING QUESTIONS:    The next two questions are to help us understand your food security.  If you are feeling you need any assistance in this area, we have resources available to support you today.    Hunger Vital Signs:  Within the past 12 months we worried whether our food would run out before we got money to buy more. Never  Within the past 12 months the food we bought just didn't last and we didn't have money to get more. Never      Katy Palacios     "

## 2023-09-22 ENCOUNTER — OFFICE VISIT (OUTPATIENT)
Dept: FAMILY MEDICINE | Facility: OTHER | Age: 48
End: 2023-09-22
Attending: STUDENT IN AN ORGANIZED HEALTH CARE EDUCATION/TRAINING PROGRAM
Payer: OTHER GOVERNMENT

## 2023-09-22 VITALS
HEART RATE: 89 BPM | TEMPERATURE: 98 F | DIASTOLIC BLOOD PRESSURE: 74 MMHG | RESPIRATION RATE: 12 BRPM | HEIGHT: 63 IN | OXYGEN SATURATION: 99 % | BODY MASS INDEX: 17.28 KG/M2 | SYSTOLIC BLOOD PRESSURE: 107 MMHG | WEIGHT: 97.5 LBS

## 2023-09-22 DIAGNOSIS — B37.31 YEAST VAGINITIS: ICD-10-CM

## 2023-09-22 DIAGNOSIS — Z23 ENCOUNTER FOR IMMUNIZATION: ICD-10-CM

## 2023-09-22 DIAGNOSIS — R39.89 URINARY PROBLEM: Primary | ICD-10-CM

## 2023-09-22 DIAGNOSIS — N94.9 VAGINAL DISCOMFORT: ICD-10-CM

## 2023-09-22 LAB
ALBUMIN UR-MCNC: 600 MG/DL
AMORPH CRY #/AREA URNS HPF: ABNORMAL /HPF
APPEARANCE UR: ABNORMAL
BACTERIAL VAGINOSIS VAG-IMP: NEGATIVE
BILIRUB UR QL STRIP: NEGATIVE
CANDIDA DNA VAG QL NAA+PROBE: NOT DETECTED
CANDIDA GLABRATA / CANDIDA KRUSEI DNA: NOT DETECTED
COLOR UR AUTO: YELLOW
GLUCOSE UR STRIP-MCNC: NEGATIVE MG/DL
HGB UR QL STRIP: NEGATIVE
KETONES UR STRIP-MCNC: NEGATIVE MG/DL
LEUKOCYTE ESTERASE UR QL STRIP: NEGATIVE
MUCOUS THREADS #/AREA URNS LPF: PRESENT /LPF
NITRATE UR QL: NEGATIVE
PH UR STRIP: 8.5 [PH] (ref 5–9)
RBC URINE: 2 /HPF
SP GR UR STRIP: 1.02 (ref 1–1.03)
T VAGINALIS DNA VAG QL NAA+PROBE: NOT DETECTED
UROBILINOGEN UR STRIP-MCNC: NORMAL MG/DL
WBC URINE: 7 /HPF

## 2023-09-22 PROCEDURE — 99213 OFFICE O/P EST LOW 20 MIN: CPT | Mod: 25 | Performed by: NURSE PRACTITIONER

## 2023-09-22 PROCEDURE — 90682 RIV4 VACC RECOMBINANT DNA IM: CPT | Performed by: NURSE PRACTITIONER

## 2023-09-22 PROCEDURE — 0352U MULTIPLEX VAGINAL PANEL BY PCR: CPT | Mod: ZL | Performed by: NURSE PRACTITIONER

## 2023-09-22 PROCEDURE — 81001 URINALYSIS AUTO W/SCOPE: CPT | Mod: ZL

## 2023-09-22 PROCEDURE — 90471 IMMUNIZATION ADMIN: CPT | Performed by: NURSE PRACTITIONER

## 2023-09-22 RX ORDER — FLUCONAZOLE 150 MG/1
150 TABLET ORAL ONCE
Qty: 1 TABLET | Refills: 0 | Status: SHIPPED | OUTPATIENT
Start: 2023-09-22 | End: 2023-09-22

## 2023-09-22 ASSESSMENT — PAIN SCALES - GENERAL: PAINLEVEL: MODERATE PAIN (5)

## 2023-09-22 NOTE — NURSING NOTE
"Chief Complaint   Patient presents with    UTI     incontinence   Patient presents to clinic for a UTI. She has been experiencing incontinence, discomfort in the bladder, and pain from urinating. She is wondering if her ear infection medication could have caused it. Symptoms started yesterday.      Katy Palacios on 9/22/2023 at 2:10 PM        Initial /74   Pulse 89   Temp 98  F (36.7  C) (Tympanic)   Resp 12   Ht 1.6 m (5' 3\")   Wt 44.2 kg (97 lb 8 oz)   SpO2 99%   BMI 17.27 kg/m   Estimated body mass index is 17.27 kg/m  as calculated from the following:    Height as of this encounter: 1.6 m (5' 3\").    Weight as of this encounter: 44.2 kg (97 lb 8 oz).       FOOD SECURITY SCREENING QUESTIONS:    The next two questions are to help us understand your food security.  If you are feeling you need any assistance in this area, we have resources available to support you today.    Hunger Vital Signs:  Within the past 12 months we worried whether our food would run out before we got money to buy more. Never  Within the past 12 months the food we bought just didn't last and we didn't have money to get more. Never      Katy Palacios     "

## 2023-09-22 NOTE — PROGRESS NOTES
ASSESSMENT/PLAN:    I have reviewed the nursing notes.  I have reviewed the findings, diagnosis, plan and need for follow up with the patient.    1. Urinary problem  - UA Macroscopic with reflex to Microscopic and Culture  Urinalysis is cloudy with 7 wbcs and no leukocytes or nitrates present. Not strongly suspicious of UTI as she is not having classic urinary symptoms. No concern of pyelo or systemic infection as discussed in office.  Did have recent antibiotics for acute otitis media.    2. Vaginal discomfort  - Multiplex Vaginal Panel by PCR  Negative for yeast and bv    3. Encounter for immunization  - INFLUENZA VACCINE 18-64Y (FLUBLOK)    4. Yeast vaginitis  - fluconazole (DIFLUCAN) 150 MG tablet; Take 1 tablet (150 mg) by mouth once for 1 dose  Dispense: 1 tablet; Refill: 0  Recent antibiotics for AOM; sample was possibly inadequate due to patient not tolerating exam well. Will treat for suspected yeast but recommend follow up if no improvement or worsening urinary symptoms.     Discussed warning signs/symptoms indicative of need to f/u    Follow up if symptoms persist or worsen or concerns    I explained my diagnostic considerations and recommendations to the patient, who voiced understanding and agreement with the treatment plan. All questions were answered. We discussed potential side effects of any prescribed or recommended therapies, as well as expectations for response to treatments.    Katelyn Maxwell NP  9/22/2023  2:20 PM    HPI:  Sara Liriano is a 48 year old female who presents to [facility] today for concerns of clinic for a UTI. She has been experiencing slight incontinence, discomfort in the bladder, and very mild pain with urinating. She is wondering if her ear infection medication could have caused it. Symptoms started in the night when she woke up once to urinate which is unusual for her.    Hx of yeast infections. No recollection of BV in her past.   She is  and denies being  "sexually active. No concerns for STDs.     Does have slight vaginal itch. \"Feels vaginal irritation and wetness all the time\".     ROS otherwise negative.     Hx of seizures. 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 Has neurologist but has recently moved from [place] to [place] and needs to transfer care. Difficult for her to get to [place] so she does not want to continue with past neurologist for that reason if possible.     Needs PCP.     No past medical history on file.  No past surgical history on file.  Social History     Tobacco Use    Smoking status: Never    Smokeless tobacco: Never   Substance Use Topics    Alcohol use: Yes     Comment: Alcoholic Drinks/day: Occassional     Current Outpatient Medications   Medication Sig Dispense Refill    fluconazole (DIFLUCAN) 150 MG tablet Take 1 tablet (150 mg) by mouth once for 1 dose 1 tablet 0    Folic Acid-Vit B6-Vit B12 (FOLBEE) 2.5-25-1 MG TABS Take 1 tablet by mouth daily      IBANdronate (BONIVA) 150 MG tablet Take 150 mg by mouth every 30 days      Lacosamide (VIMPAT) 100 MG TABS tablet       lamoTRIgine (LAMICTAL) 200 MG tablet       levothyroxine (SYNTHROID/LEVOTHROID) 75 MCG tablet Take 75 mcg by mouth every morning (before breakfast)      MULTIPLE VITAMIN PO       Nutritional Supplements (ENSURE HIGH PROTEIN) Take  by mouth.      OLANZapine (ZYPREXA) 5 MG tablet       omeprazole (PRILOSEC) 40 MG capsule Take 40 mg by mouth 2 times daily      zoledronic Acid (RECLAST) 5 MG/100ML SOLN infusion Inject 5 mg into the vein      amoxicillin-clavulanate (AUGMENTIN) 875-125 MG tablet Take 1 tablet by mouth 2 times daily (Patient not taking: Reported on 9/13/2023) 20 tablet 0     Allergies   Allergen Reactions    Fluoxetine Other (See Comments) and Swelling     Blurred vision, heart racing, face swelling    Amoxicillin Other (See Comments)     \"Thrush\"     Past medical history, past surgical history, current medications and allergies " "reviewed and accurate to the best of my knowledge.      ROS:  Refer to HPI    /74   Pulse 89   Temp 98  F (36.7  C) (Tympanic)   Resp 12   Ht 1.6 m (5' 3\")   Wt 44.2 kg (97 lb 8 oz)   SpO2 99%   BMI 17.27 kg/m      EXAM:  General Appearance: Well appearing 48 year old female, appropriate appearance for age. No acute distress   Respiratory:  No increased work of breathing.  No cough appreciated.  Abdomen: soft, nontender, no rigidity, no rebound tenderness or guarding, normal bowel sounds present  :  No suprapubic tenderness to palpation.  Absent CVA tenderness to palpation.    Speculum exam: + vaginal opening is small and speculum was not easily inserted thus did not visualize cervix or proximal vaginal canal. There is small amount of white discharge observed in the distal canal.   Musculoskeletal:  Equal movement of bilateral upper extremities.  Equal movement of bilateral lower extremities.  Normal gait.    Neuro: Alert and oriented to person, place, and time.   Psychological: normal affect, alert, oriented, and pleasant.     Results for orders placed or performed in visit on 09/22/23   UA Macroscopic with reflex to Microscopic and Culture     Status: Abnormal    Specimen: Urine, Clean Catch   Result Value Ref Range    Color Urine Yellow Colorless, Straw, Light Yellow, Yellow    Appearance Urine Cloudy (A) Clear    Glucose Urine Negative Negative mg/dL    Bilirubin Urine Negative Negative    Ketones Urine Negative Negative mg/dL    Specific Gravity Urine 1.018 1.000 - 1.030    Blood Urine Negative Negative    pH Urine 8.5 5.0 - 9.0    Protein Albumin Urine 600 (A) Negative mg/dL    Urobilinogen Urine Normal Normal, 2.0 mg/dL    Nitrite Urine Negative Negative    Leukocyte Esterase Urine Negative Negative    Mucus Urine Present (A) None Seen /LPF    Amorphous Crystals Urine Many (A) None Seen /HPF    RBC Urine 2 <=2 /HPF    WBC Urine 7 (H) <=5 /HPF    Narrative    Urine Culture not indicated "   Multiplex Vaginal Panel by PCR     Status: Normal    Specimen: Vagina; Swab   Result Value Ref Range    Bacterial Vaginosis Organism DNA Negative Negative    Candida Group DNA Not Detected Not Detected    Candida glabrata / Karla krusei DNA Not Detected Not Detected    Trichomonas vaginalis DNA Not Detected Not Detected    Narrative    The Xpert  Xpress MVP test, performed on the [employer], is an automated, qualitative in vitro diagnostic test for the detection of DNA targets from anaerobic bacteria associated with bacterial vaginosis, Candida species associated with vulvovaginal candidiasis, and Trichomonas vaginalis. The assay uses clinician-collected and self-collected vaginal swabs from patients who are symptomatic for vaginitis/ vaginosis. The Xpert  Xpress MVP test utilizes real-time polymerase chain reaction (PCR) for the amplification of specific DNA targets and utilizes fluorogenic target-specific hybridization probes to detect and differentiate DNA. It is intended to aid in the diagnosis of vaginal infections in women with a clinical presentation consistent with bacterial vaginosis, vulvovaginal candidiasis, or trichomoniasis.   The assay targets three anaerobic microorgansims that are associated with bacterial vaginosis (BV). Other organisms that are not detected by the Xpert  Xpress MVP test have also been reported to be associated with BV. The BV organism and Candida species targets of the Xpert  Xpress MVP test can be commensal in women; positive results must be considered in conjunction with other clinical and patient information to determine the disease status.

## 2023-09-24 ENCOUNTER — HOSPITAL ENCOUNTER (EMERGENCY)
Facility: OTHER | Age: 48
Discharge: HOME | End: 2023-09-24
Attending: EMERGENCY MEDICINE | Admitting: EMERGENCY MEDICINE
Payer: OTHER GOVERNMENT

## 2023-09-24 VITALS
WEIGHT: 100 LBS | OXYGEN SATURATION: 95 % | HEART RATE: 86 BPM | BODY MASS INDEX: 17.72 KG/M2 | HEIGHT: 63 IN | TEMPERATURE: 96.9 F | SYSTOLIC BLOOD PRESSURE: 106 MMHG | DIASTOLIC BLOOD PRESSURE: 76 MMHG

## 2023-09-24 DIAGNOSIS — T14.90XD HEALING WOUND: ICD-10-CM

## 2023-09-24 PROCEDURE — 99282 EMERGENCY DEPT VISIT SF MDM: CPT | Performed by: EMERGENCY MEDICINE

## 2023-09-24 ASSESSMENT — ENCOUNTER SYMPTOMS
SLEEP DISTURBANCE: 0
NECK PAIN: 0
NEUROLOGICAL NEGATIVE: 1
CARDIOVASCULAR NEGATIVE: 1
PSYCHIATRIC NEGATIVE: 1
RESPIRATORY NEGATIVE: 1
EYES NEGATIVE: 1
ENDOCRINE NEGATIVE: 1
PHOTOPHOBIA: 0
NECK STIFFNESS: 0
CONSTITUTIONAL NEGATIVE: 1
HEMATOLOGIC/LYMPHATIC NEGATIVE: 1
SORE THROAT: 0
ALLERGIC/IMMUNOLOGIC NEGATIVE: 1
GASTROINTESTINAL NEGATIVE: 1
MUSCULOSKELETAL NEGATIVE: 1

## 2023-09-24 NOTE — ED TRIAGE NOTES
Pt is concerned that her left thumb is infected from suture removal on 9/5.  Pt reports pain with movement, no drainage, site is a deep red, and slightly swollen.

## 2023-09-25 NOTE — DISCHARGE INSTRUCTIONS
1) Follow up with up your doctor this week.   2) Follow the aftercare instructions provided.   3) Return to the ER if you develop any new or worsening symptoms.

## 2023-09-25 NOTE — ED PROVIDER NOTES
"  History     Chief Complaint   Patient presents with    Infection     Pt is concerned that her left thumb is infected from suture removal on 9/5.  Pt reports pain with movement, no drainage, site is a deep red, and slightly swollen.     HPI  Sara Liriano is a 48 year old female who is today with complaints of left thumb redness.  Patient concerned she may have a thumb infection.  Patient also states that she sometimes hears a crackling sound in her ears particularly worsened when she is on her phone.  Patient would like her ears examined.  Has no additional complaints.  Patient is right-handed    Allergies:  Allergies   Allergen Reactions    Fluoxetine Other (See Comments) and Swelling     Blurred vision, heart racing, face swelling    Amoxicillin Other (See Comments)     \"Thrush\"       Problem List:    There are no problems to display for this patient.       Past Medical History:    No past medical history on file.    Past Surgical History:    No past surgical history on file.    Family History:    No family history on file.    Social History:  Marital Status:  Single [1]  Social History     Tobacco Use    Smoking status: Never    Smokeless tobacco: Never   Vaping Use    Vaping Use: Never used   Substance Use Topics    Alcohol use: Yes     Comment: Alcoholic Drinks/day: Occassional    Drug use: No     Comment: Drug use: No        Medications:    Folic Acid-Vit B6-Vit B12 (FOLBEE) 2.5-25-1 MG TABS  IBANdronate (BONIVA) 150 MG tablet  Lacosamide (VIMPAT) 100 MG TABS tablet  lamoTRIgine (LAMICTAL) 200 MG tablet  levothyroxine (SYNTHROID/LEVOTHROID) 75 MCG tablet  MULTIPLE VITAMIN PO  Nutritional Supplements (ENSURE HIGH PROTEIN)  amoxicillin-clavulanate (AUGMENTIN) 875-125 MG tablet  OLANZapine (ZYPREXA) 5 MG tablet  omeprazole (PRILOSEC) 40 MG capsule  zoledronic Acid (RECLAST) 5 MG/100ML SOLN infusion          Review of Systems   Constitutional: Negative.    HENT: Negative.  Negative for ear pain, sneezing " "and sore throat.    Eyes: Negative.  Negative for photophobia.   Respiratory: Negative.     Cardiovascular: Negative.    Gastrointestinal: Negative.    Endocrine: Negative.    Genitourinary: Negative.    Musculoskeletal: Negative.  Negative for neck pain and neck stiffness.   Skin: Negative.    Allergic/Immunologic: Negative.    Neurological: Negative.    Hematological: Negative.    Psychiatric/Behavioral: Negative.  Negative for sleep disturbance and suicidal ideas.        Physical Exam   Height: 160 cm (5' 3\")  Weight: 45.4 kg (100 lb)      Physical Exam  Constitutional:       General: She is not in acute distress.     Appearance: Normal appearance. She is normal weight. She is not toxic-appearing.   HENT:      Right Ear: Tympanic membrane, ear canal and external ear normal.      Left Ear: Tympanic membrane, ear canal and external ear normal.      Mouth/Throat:      Mouth: Mucous membranes are moist.   Pulmonary:      Effort: Pulmonary effort is normal.   Abdominal:      Palpations: Abdomen is soft.   Musculoskeletal:      Cervical back: Normal range of motion.   Skin:     Capillary Refill: Capillary refill takes less than 2 seconds.      Comments: Left thumb -no evidence of redness.  No point tenderness over the thumb.  Full range of motion of thumb.  Good radial pulse good capillary refill   Neurological:      General: No focal deficit present.      Mental Status: She is alert.   Psychiatric:         Mood and Affect: Mood normal.         Behavior: Behavior normal.         ED Course           Procedures     No results found for this or any previous visit (from the past 24 hour(s)).    Medications - No data to display    Assessments & Plan (with Medical Decision Making)       48-year-old female with concerns of a healing left laceration.  Patient concerned it may be infected.  On exam thumb look normal with obvious healing wound.  Full range of motion of, no redness, warmth or any pus discharge.  Patient also has " secondary complaint that she may have an ear infection.  Both ears lappear normal.  I explained to patient she should follow-up with her primary care doctor this week.  Understands to return if she develops any new or worsening symptoms.    New Prescriptions    No medications on file       Final diagnoses:   Healing wound       9/24/2023   [facility] AND [facility]       Nely Smith MD  09/25/23 0002

## 2023-10-18 ENCOUNTER — HOSPITAL ENCOUNTER (EMERGENCY)
Facility: OTHER | Age: 48
Discharge: HOME | End: 2023-10-18
Attending: FAMILY MEDICINE | Admitting: FAMILY MEDICINE
Payer: OTHER GOVERNMENT

## 2023-10-18 VITALS
SYSTOLIC BLOOD PRESSURE: 102 MMHG | RESPIRATION RATE: 24 BRPM | HEART RATE: 95 BPM | WEIGHT: 95.3 LBS | DIASTOLIC BLOOD PRESSURE: 76 MMHG | TEMPERATURE: 98.2 F | HEIGHT: 64 IN | BODY MASS INDEX: 16.27 KG/M2 | OXYGEN SATURATION: 98 %

## 2023-10-18 DIAGNOSIS — R42 DIZZINESS: ICD-10-CM

## 2023-10-18 LAB
ALBUMIN SERPL BCG-MCNC: 4.1 G/DL (ref 3.5–5.2)
ALP SERPL-CCNC: 71 U/L (ref 35–104)
ALT SERPL W P-5'-P-CCNC: 47 U/L (ref 0–50)
ANION GAP SERPL CALCULATED.3IONS-SCNC: 8 MMOL/L (ref 7–15)
AST SERPL W P-5'-P-CCNC: 40 U/L (ref 0–45)
BASO+EOS+MONOS # BLD AUTO: ABNORMAL 10*3/UL
BASO+EOS+MONOS NFR BLD AUTO: ABNORMAL %
BASOPHILS # BLD AUTO: 0.1 10E3/UL (ref 0–0.2)
BASOPHILS NFR BLD AUTO: 1 %
BILIRUB SERPL-MCNC: 0.2 MG/DL
BUN SERPL-MCNC: 11.9 MG/DL (ref 6–20)
CALCIUM SERPL-MCNC: 9.1 MG/DL (ref 8.6–10)
CHLORIDE SERPL-SCNC: 100 MMOL/L (ref 98–107)
CREAT SERPL-MCNC: 0.65 MG/DL (ref 0.51–0.95)
DEPRECATED HCO3 PLAS-SCNC: 32 MMOL/L (ref 22–29)
EGFRCR SERPLBLD CKD-EPI 2021: >90 ML/MIN/1.73M2
EOSINOPHIL # BLD AUTO: 0.1 10E3/UL (ref 0–0.7)
EOSINOPHIL NFR BLD AUTO: 1 %
ERYTHROCYTE [DISTWIDTH] IN BLOOD BY AUTOMATED COUNT: 12.5 % (ref 10–15)
GLUCOSE BLDC GLUCOMTR-MCNC: 87 MG/DL (ref 70–99)
GLUCOSE SERPL-MCNC: 103 MG/DL (ref 70–99)
HCT VFR BLD AUTO: 41.2 % (ref 35–47)
HGB BLD-MCNC: 13.5 G/DL (ref 11.7–15.7)
HOLD SPECIMEN: NORMAL
IMM GRANULOCYTES # BLD: 0 10E3/UL
IMM GRANULOCYTES NFR BLD: 0 %
LYMPHOCYTES # BLD AUTO: 2.2 10E3/UL (ref 0.8–5.3)
LYMPHOCYTES NFR BLD AUTO: 39 %
MCH RBC QN AUTO: 33.1 PG (ref 26.5–33)
MCHC RBC AUTO-ENTMCNC: 32.8 G/DL (ref 31.5–36.5)
MCV RBC AUTO: 101 FL (ref 78–100)
MONOCYTES # BLD AUTO: 0.5 10E3/UL (ref 0–1.3)
MONOCYTES NFR BLD AUTO: 8 %
NEUTROPHILS # BLD AUTO: 2.8 10E3/UL (ref 1.6–8.3)
NEUTROPHILS NFR BLD AUTO: 51 %
NRBC # BLD AUTO: 0 10E3/UL
NRBC BLD AUTO-RTO: 0 /100
PLATELET # BLD AUTO: 318 10E3/UL (ref 150–450)
POTASSIUM SERPL-SCNC: 3.8 MMOL/L (ref 3.4–5.3)
PROT SERPL-MCNC: 6.2 G/DL (ref 6.4–8.3)
RBC # BLD AUTO: 4.08 10E6/UL (ref 3.8–5.2)
SODIUM SERPL-SCNC: 140 MMOL/L (ref 135–145)
TSH SERPL DL<=0.005 MIU/L-ACNC: 0.99 UIU/ML (ref 0.3–4.2)
WBC # BLD AUTO: 5.7 10E3/UL (ref 4–11)

## 2023-10-18 PROCEDURE — 80053 COMPREHEN METABOLIC PANEL: CPT | Performed by: FAMILY MEDICINE

## 2023-10-18 PROCEDURE — 93010 ELECTROCARDIOGRAM REPORT: CPT | Performed by: INTERNAL MEDICINE

## 2023-10-18 PROCEDURE — 85025 COMPLETE CBC W/AUTO DIFF WBC: CPT | Performed by: FAMILY MEDICINE

## 2023-10-18 PROCEDURE — 84443 ASSAY THYROID STIM HORMONE: CPT | Performed by: FAMILY MEDICINE

## 2023-10-18 PROCEDURE — 36415 COLL VENOUS BLD VENIPUNCTURE: CPT | Performed by: FAMILY MEDICINE

## 2023-10-18 PROCEDURE — 99284 EMERGENCY DEPT VISIT MOD MDM: CPT | Performed by: FAMILY MEDICINE

## 2023-10-18 PROCEDURE — 93005 ELECTROCARDIOGRAM TRACING: CPT | Performed by: FAMILY MEDICINE

## 2023-10-18 PROCEDURE — 82962 GLUCOSE BLOOD TEST: CPT

## 2023-10-18 ASSESSMENT — ACTIVITIES OF DAILY LIVING (ADL): ADLS_ACUITY_SCORE: 35
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
## 2023-10-18 NOTE — ED TRIAGE NOTES
"Pt is concerned about a possible stroke.   States there is significant family hx.   Pt noticed that 2 days ago she started to list to the right and is having some dizziness at that time.   Denies headaches.   Pt does state that she has been feeling her heart beat as well into her chest.   While asking her to give more details, she stated that her heart hurts 7/10.  /68   Pulse 86   Temp 98.2  F (36.8  C)   Resp 16   Ht 1.626 m (5' 4\")   Wt 45.4 kg (100 lb)   SpO2 100%   BMI 17.16 kg/m     Sil Dinero RN on 10/18/2023 at 12:23 PM       Triage Assessment (Adult)       Row Name 10/18/23 1221          Triage Assessment    Airway WDL WDL        Respiratory WDL    Respiratory WDL WDL        Skin Circulation/Temperature WDL    Skin Circulation/Temperature WDL WDL        Cardiac WDL    Cardiac WDL WDL        Peripheral/Neurovascular WDL    Peripheral Neurovascular WDL WDL        Cognitive/Neuro/Behavioral WDL    Cognitive/Neuro/Behavioral WDL WDL                     " EXAM DESCRIPTION:  RAD - Chest Single View - 1/16/2021 4:20 pm

 

CLINICAL HISTORY:  AMS

Chest pain.

 

COMPARISON:  Chest Single View dated 11/12/2020; Chest Pa And Lat (2 Views) dated 11/2/2020; Chest Si
ngle View dated 11/1/2020

 

FINDINGS:  Portable technique limits examination quality.

 

Mild bilateral interstitial lung opacities are seen which may represent interstitial pneumonia or mil
d interstitial pulmonary edema. The heart is mildly enlarged in size. No displaced fractures.

## 2023-10-18 NOTE — DISCHARGE INSTRUCTIONS
Thank you for choosing our Emergency Department for your care.     You may receive a phone call or letter for a survey about your care in our ED.  Please complete this as this is how we improve care for our patients.     If you have any questions after leaving the ED you can call or text me on my cell phone at [phone] and I will get back to you at some point. This does not mean that I am on call and if you are not doing well please return to the ED.     Sincerely,    Dr Bakari Su M.D.

## 2023-10-19 LAB
ATRIAL RATE - MUSE: 75 BPM
DIASTOLIC BLOOD PRESSURE - MUSE: NORMAL MMHG
INTERPRETATION ECG - MUSE: NORMAL
P AXIS - MUSE: 61 DEGREES
PR INTERVAL - MUSE: 150 MS
QRS DURATION - MUSE: 88 MS
QT - MUSE: 394 MS
QTC - MUSE: 439 MS
R AXIS - MUSE: 57 DEGREES
SYSTOLIC BLOOD PRESSURE - MUSE: NORMAL MMHG
T AXIS - MUSE: 59 DEGREES
VENTRICULAR RATE- MUSE: 75 BPM

## 2023-10-23 ENCOUNTER — OFFICE VISIT (OUTPATIENT)
Dept: FAMILY MEDICINE | Facility: OTHER | Age: 48
End: 2023-10-23
Attending: PHYSICIAN ASSISTANT
Payer: OTHER GOVERNMENT

## 2023-10-23 VITALS
TEMPERATURE: 98 F | HEART RATE: 57 BPM | WEIGHT: 98 LBS | OXYGEN SATURATION: 95 % | BODY MASS INDEX: 16.73 KG/M2 | HEIGHT: 64 IN | RESPIRATION RATE: 17 BRPM | DIASTOLIC BLOOD PRESSURE: 79 MMHG | SYSTOLIC BLOOD PRESSURE: 106 MMHG

## 2023-10-23 DIAGNOSIS — R56.9 SEIZURES (H): Primary | ICD-10-CM

## 2023-10-23 DIAGNOSIS — E51.9 VITAMIN B1 DEFICIENCY: ICD-10-CM

## 2023-10-23 DIAGNOSIS — Z00.00 ENCOUNTER FOR MEDICAL EXAMINATION TO ESTABLISH CARE: ICD-10-CM

## 2023-10-23 DIAGNOSIS — E55.9 VITAMIN D DEFICIENCY: ICD-10-CM

## 2023-10-23 DIAGNOSIS — K59.01 SLOW TRANSIT CONSTIPATION: ICD-10-CM

## 2023-10-23 DIAGNOSIS — E87.6 HYPOKALEMIA: ICD-10-CM

## 2023-10-23 DIAGNOSIS — H69.93 DYSFUNCTION OF BOTH EUSTACHIAN TUBES: ICD-10-CM

## 2023-10-23 DIAGNOSIS — F51.01 PRIMARY INSOMNIA: ICD-10-CM

## 2023-10-23 DIAGNOSIS — E53.8 VITAMIN B12 DEFICIENCY (NON ANEMIC): ICD-10-CM

## 2023-10-23 DIAGNOSIS — E83.42 HYPOMAGNESEMIA: ICD-10-CM

## 2023-10-23 DIAGNOSIS — E06.3 HYPOTHYROIDISM DUE TO HASHIMOTO'S THYROIDITIS: ICD-10-CM

## 2023-10-23 DIAGNOSIS — H54.7 VISION IMPAIRMENT: ICD-10-CM

## 2023-10-23 LAB — MAGNESIUM SERPL-MCNC: 3.4 MG/DL (ref 1.7–2.3)

## 2023-10-23 PROCEDURE — 99214 OFFICE O/P EST MOD 30 MIN: CPT | Performed by: PHYSICIAN ASSISTANT

## 2023-10-23 PROCEDURE — 36415 COLL VENOUS BLD VENIPUNCTURE: CPT | Mod: ZL | Performed by: PHYSICIAN ASSISTANT

## 2023-10-23 PROCEDURE — 83735 ASSAY OF MAGNESIUM: CPT | Mod: ZL | Performed by: PHYSICIAN ASSISTANT

## 2023-10-23 RX ORDER — LANOLIN ALCOHOL/MO/W.PET/CERES
100 CREAM (GRAM) TOPICAL DAILY
Qty: 90 TABLET | Refills: 3 | Status: SHIPPED | OUTPATIENT
Start: 2023-10-23 | End: 2024-06-12

## 2023-10-23 RX ORDER — DOCUSATE SODIUM 100 MG/1
100 CAPSULE, LIQUID FILLED ORAL 2 TIMES DAILY PRN
Qty: 180 CAPSULE | Refills: 0 | Status: ON HOLD | OUTPATIENT
Start: 2023-10-23 | End: 2023-11-09

## 2023-10-23 RX ORDER — CALCIUM CARBONATE 500(1250)
1 TABLET ORAL DAILY
Qty: 180 TABLET | Refills: 0 | Status: SHIPPED | OUTPATIENT
Start: 2023-10-23 | End: 2024-02-23

## 2023-10-23 RX ORDER — ECHINACEA PURPUREA EXTRACT 125 MG
TABLET ORAL
Qty: 30 ML | Refills: 0 | Status: SHIPPED | OUTPATIENT
Start: 2023-10-23 | End: 2024-06-12

## 2023-10-23 RX ORDER — LACOSAMIDE 100 MG/1
100 TABLET ORAL 2 TIMES DAILY
Qty: 180 TABLET | Refills: 3 | Status: SHIPPED | OUTPATIENT
Start: 2023-10-23 | End: 2024-05-08

## 2023-10-23 RX ORDER — LAMOTRIGINE 200 MG/1
200 TABLET ORAL 2 TIMES DAILY
Qty: 180 TABLET | Refills: 3 | Status: SHIPPED | OUTPATIENT
Start: 2023-10-23 | End: 2024-06-03

## 2023-10-23 RX ORDER — LEVOTHYROXINE SODIUM 50 UG/1
TABLET ORAL
Qty: 90 TABLET | Refills: 3 | Status: SHIPPED | OUTPATIENT
Start: 2023-10-23 | End: 2024-07-16

## 2023-10-23 RX ORDER — LANOLIN ALCOHOL/MO/W.PET/CERES
1000 CREAM (GRAM) TOPICAL DAILY
Qty: 90 TABLET | Refills: 3 | Status: SHIPPED | OUTPATIENT
Start: 2023-10-23 | End: 2024-06-12

## 2023-10-23 RX ORDER — LANOLIN ALCOHOL/MO/W.PET/CERES
3 CREAM (GRAM) TOPICAL
Qty: 90 TABLET | Refills: 3 | Status: SHIPPED | OUTPATIENT
Start: 2023-10-23 | End: 2024-07-16

## 2023-10-23 RX ORDER — CHOLECALCIFEROL (VITAMIN D3) 50 MCG
1 TABLET ORAL DAILY
Qty: 90 TABLET | Refills: 3 | Status: SHIPPED | OUTPATIENT
Start: 2023-10-23 | End: 2024-06-12

## 2023-10-23 RX ORDER — POTASSIUM CHLORIDE 1500 MG/1
20 TABLET, EXTENDED RELEASE ORAL DAILY
Qty: 90 TABLET | Refills: 3 | Status: SHIPPED | OUTPATIENT
Start: 2023-10-23 | End: 2024-06-12

## 2023-10-23 RX ORDER — FLUTICASONE PROPIONATE 50 MCG
1 SPRAY, SUSPENSION (ML) NASAL DAILY
Qty: 11.1 ML | Refills: 0 | Status: SHIPPED | OUTPATIENT
Start: 2023-10-23 | End: 2023-12-28

## 2023-10-23 RX ORDER — POLYETHYLENE GLYCOL 3350 17 G/17G
1 POWDER, FOR SOLUTION ORAL DAILY
Qty: 850 G | Refills: 4 | Status: SHIPPED | OUTPATIENT
Start: 2023-10-23

## 2023-10-23 ASSESSMENT — PAIN SCALES - GENERAL: PAINLEVEL: NO PAIN (0)

## 2023-10-23 NOTE — COMMUNITY RESOURCES LIST (ENGLISH)
10/23/2023   [facility] [employer]  N/A  For questions about this resource list or additional care needs, please contact your primary care clinic or care manager.  Phone: [phone]   Email: N/A   Address: [address] [place] [id number]   Hours: N/A        Transportation       Free or low-cost transportation  1  [facility] [employer] [facility] [employer] - Free or low-cost transportation Distance: 1.86 miles      In-Person   [address] [place] [address]  Language: English  Hours: Mon 8:00 AM - 4:30 PM Appt. Only, Tue - Thu 8:00 AM - 4:30 PM , Fri 8:00 AM - 4:30 PM Appt. Only  Fees: Free   Phone: [phone] Website: http://www.[employer].org          Important Numbers & Websites       Emergency Services   911  City Services   311  Poison Control   (845) 878-4704  Suicide Prevention Lifeline   (583) 252-3065 (TALK)  Child Abuse Hotline   (274) 268-7132 (4-A-Child)  Sexual Assault Hotline   (509) 994-2661 (HOPE)  National Runaway Safeline   (542) 486-3393 (RUNAWAY)  All-Options Talkline   (566) 548-1577  Substance Abuse Referral   (482) 922-1385 (HELP)

## 2023-10-23 NOTE — PATIENT INSTRUCTIONS
Check Magnesium levels    Refill all medications    Referral to Dr. Chin for neurology for seizure follow-up - you will be contacted to schedule  Referral to eye doctor - Dr. Mercedes, you will be contacted to schedule    Follow up with CARLOS Munoz in 6 months for recheck on meds and lab work - sooner if questions/concerns arise.

## 2023-10-23 NOTE — PROGRESS NOTES
Assessment & Plan       ICD-10-CM    1. Seizures (H)  R56.9 Adult Neurology  Referral     Lacosamide (VIMPAT) 100 MG TABS tablet     lamoTRIgine (LAMICTAL) 200 MG tablet      2. Vitamin B12 deficiency (non anemic)  E53.8 cyanocobalamin (VITAMIN B-12) 1000 MCG tablet      3. Vitamin B1 deficiency  E51.9 thiamine (B-1) 100 MG tablet      4. Slow transit constipation  K59.01 polyethylene glycol (MIRALAX) 17 GM/Dose powder     docusate sodium (COLACE) 100 MG capsule      5. Vitamin D deficiency  E55.9 vitamin D3 (CHOLECALCIFEROL) 50 mcg (2000 units) tablet      6. Hypomagnesemia  E83.42 Magnesium     CANCELED: Magnesium      7. Hypokalemia  E87.6 potassium chloride ER (KLOR-CON M) 20 MEQ CR tablet      8. Primary insomnia  F51.01 melatonin 3 MG tablet      9. Hypothyroidism due to Hashimoto's thyroiditis  E03.8 levothyroxine (SYNTHROID/LEVOTHROID) 50 MCG tablet    E06.3 calcium carbonate (OS-MOISES) 500 MG tablet      10. Dysfunction of both eustachian tubes  H69.93 sodium chloride (OCEAN) 0.65 % nasal spray     fluticasone (FLONASE) 50 MCG/ACT nasal spray      11. Vision impairment  H54.7 Adult Eye  Referral      12. Encounter for medical examination to establish care  Z00.00         I would recommend a referral to neurology for ongoing seizure management, she is agreeable to this, referral was placed, she will be contacted to schedule.  I did refill her Vimpat and Lamictal at current dosing.  Seizure precautions reviewed with patient.  Refill of vitamin B12, she is on this for vitamin B12 deficiency associated with anorexia.  Stable on current dosing without any adverse effects.  We will recheck lab work in 6 months.  B1 deficiency secondary to anorexia per patient.  Stable on current dosing, this is refilled, recheck in 6 months.  Chronic slow transit constipation.  Refill of MiraLAX for daily use and Doculax for as needed use.  Increase fiber rich foods, these were reviewed verbally.  Currently on  vitamin D 2000 IUs daily, stable on current dosing, recheck lab work in 6 months.  Rx refilled today.  We will recheck magnesium level today, will update on results and recommendations as available.  Chronic hypokalemia, this returned at 3.8 on 10/18/2023, we will keep her on current dosing of 20 mEq daily and recheck in 6 months.  She is aware of triggers for insomnia, secondary to medications and stress.  Refill melatonin, she may take 3 to 6 mg at night for sleep.  Reviewed good sleep hygiene verbally.  Stable on current dosing of levothyroxine 50 mcg once daily, she will continue to take this in the morning 1 hour prior to eating and taking other medications.  Recheck lab work in 6 months.  She does have mild clear effusions the bilateral tympanic membranes, will start on the use nasal saline spray and Flonase to help with middle ear effusion and eustachian tube dysfunction.  1 spray in each nostril daily for each of the above.  Referral to ophthalmology for ongoing eye care, she will be contacted to schedule.  Updated within chart today     See Patient Instructions    Return in about 6 months (around 4/23/2024), or if symptoms worsen or fail to improve.    Tammy Rhoades PA-C  [facility]    Subjective   Sara is a 48 year old, presenting for the following health issues:  Establish Care         No data to display              History of Present Illness       Reason for visit:  New docter    She eats 4 or more servings of fruits and vegetables daily.She consumes 1 sweetened beverage(s) daily.She exercises with enough effort to increase her heart rate 9 or less minutes per day.  She exercises with enough effort to increase her heart rate 5 days per week.   She is taking medications regularly.    Sara presents today to establish care and for medication refills.  She previously doctored at [facility] in [place], and previous to that she doctored at [facility] in [facility]  "[place].    She reports a history of anorexia, she states she is doing quite well with this.  She no longer weighs herself 2-3 times a day and enjoys branching out and eating more \"adventurous\" foods.  She states she feels quite stable with her weight.  She does endorse that her vitamin and nutrient deficiencies are likely due to her anorexia, this has been stated by previous primary care team.    She also previously followed with a neurologist in [facility] for her seizures, she is unsure of who she followed with but is on lamotrigine and Vimpat for seizure management.  Last seizure was approximately 1 month ago, she states she has good seizure awareness, these are more prominent with stress and activity related (strenuous activity).  She has no missed dosages of her medications.  She is adamant to keep these on a medication/pill box. She is unsure of seizure type (grand mal, etc.).     Would like referral to see a local eye doctor, she wears glasses, unsure of last eye exam.  Declines any flashers or floaters, no acute vision changes. Non diabetic.     Review of Systems   Constitutional, HEENT, cardiovascular, pulmonary, GI, , musculoskeletal, neuro, skin, endocrine and psych systems are negative, except as otherwise noted.      Objective    /79 (BP Location: Right arm, Patient Position: Sitting, Cuff Size: Adult Regular)   Pulse 57   Temp 98  F (36.7  C) (Temporal)   Resp 17   Ht 1.626 m (5' 4\")   Wt 44.5 kg (98 lb)   SpO2 95%   BMI 16.82 kg/m    Body mass index is 16.82 kg/m .  Physical Exam   GENERAL: healthy, alert and no distress  EYES: Eyes grossly normal to inspection, PERRL and conjunctivae and sclerae normal  HENT: ear canals and TM's normal, nose and mouth without ulcers or lesions  NECK: no adenopathy, no asymmetry, masses, or scars and thyroid normal to palpation  RESP: lungs clear to auscultation - no rales, rhonchi or wheezes  CV: regular rate and rhythm, normal S1 S2, no S3 or " S4, no murmur, click or rub, no peripheral edema and peripheral pulses strong  ABDOMEN: soft, nontender, no hepatosplenomegaly, no masses and bowel sounds normal  MS: no gross musculoskeletal defects noted, no edema  SKIN: no suspicious lesions or rashes  PSYCH: mentation appears normal, affect normal/bright

## 2023-10-23 NOTE — COMMUNITY RESOURCES LIST (ENGLISH)
10/23/2023   [facility] [employer]  N/A  For questions about this resource list or additional care needs, please contact your primary care clinic or care manager.  Phone: [phone]   Email: N/A   Address: [address] [place] [address]   Hours: N/A        Transportation       Free or low-cost transportation  1  [facility] [employer] [facility] [employer] - Free or low-cost transportation Distance: 1.86 miles      In-Person   [address] [place] [address]  Language: English  Hours: Mon 8:00 AM - 4:30 PM Appt. Only, Tue - Thu 8:00 AM - 4:30 PM , Fri 8:00 AM - 4:30 PM Appt. Only  Fees: Free   Phone: (713) [phone] Website: http://www.[email or web address].org          Important Numbers & Websites       Emergency Services   911  City Services   311  Poison Control   (989) 316-9209  Suicide Prevention Lifeline   (717) 650-4351 (TALK)  Child Abuse Hotline   (565) 992-9129 (4-A-Child)  Sexual Assault Hotline   (671) 880-2010 (HOPE)  National Runaway Safeline   (530) 446-3779 (RUNAWAY)  All-Options Talkline   (607) 800-7302  Substance Abuse Referral   (419) 818-2443 (HELP)

## 2023-10-23 NOTE — NURSING NOTE
"Chief Complaint   Patient presents with    Establish Care         Initial /79 (BP Location: Right arm, Patient Position: Sitting, Cuff Size: Adult Regular)   Pulse 57   Temp 98  F (36.7  C) (Temporal)   Resp 17   Ht 1.626 m (5' 4\")   Wt 44.5 kg (98 lb)   SpO2 95%   BMI 16.82 kg/m   Estimated body mass index is 16.82 kg/m  as calculated from the following:    Height as of this encounter: 1.626 m (5' 4\").    Weight as of this encounter: 44.5 kg (98 lb).         Jacy Malik"

## 2023-11-08 ENCOUNTER — APPOINTMENT (OUTPATIENT)
Dept: GENERAL RADIOLOGY | Facility: OTHER | Age: 48
End: 2023-11-08
Attending: PHYSICIAN ASSISTANT
Payer: OTHER GOVERNMENT

## 2023-11-08 ENCOUNTER — HOSPITAL ENCOUNTER (OUTPATIENT)
Facility: OTHER | Age: 48
Setting detail: OBSERVATION
Discharge: HOME | End: 2023-11-09
Attending: PHYSICIAN ASSISTANT | Admitting: PHYSICIAN ASSISTANT
Payer: OTHER GOVERNMENT

## 2023-11-08 ENCOUNTER — APPOINTMENT (OUTPATIENT)
Dept: CT IMAGING | Facility: OTHER | Age: 48
End: 2023-11-08
Attending: PHYSICIAN ASSISTANT
Payer: OTHER GOVERNMENT

## 2023-11-08 DIAGNOSIS — S70.01XA CONTUSION OF RIGHT HIP, INITIAL ENCOUNTER: ICD-10-CM

## 2023-11-08 DIAGNOSIS — H83.92 INNER EAR DYSFUNCTION, LEFT: ICD-10-CM
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
## 2023-11-08 DIAGNOSIS — Z86.73 PERSONAL HISTORY OF TRANSIENT CEREBRAL ISCHEMIA: ICD-10-CM

## 2023-11-08 DIAGNOSIS — M16.11 PRIMARY OSTEOARTHRITIS OF RIGHT HIP: ICD-10-CM

## 2023-11-08 DIAGNOSIS — S49.91XA INJURY OF RIGHT UPPER ARM, INITIAL ENCOUNTER: ICD-10-CM

## 2023-11-08 DIAGNOSIS — S32.591A: ICD-10-CM

## 2023-11-08 DIAGNOSIS — G40.909 NONINTRACTABLE EPILEPSY WITHOUT STATUS EPILEPTICUS, UNSPECIFIED EPILEPSY TYPE (H): ICD-10-CM

## 2023-11-08 DIAGNOSIS — R56.9 SEIZURE (H): ICD-10-CM

## 2023-11-08 DIAGNOSIS — S49.91XA SHOULDER INJURY, RIGHT, INITIAL ENCOUNTER: ICD-10-CM

## 2023-11-08 DIAGNOSIS — M19.011 ARTHRITIS OF RIGHT SHOULDER REGION: ICD-10-CM

## 2023-11-08 DIAGNOSIS — M89.9 BONE DISEASE: ICD-10-CM

## 2023-11-08 DIAGNOSIS — H83.92: ICD-10-CM

## 2023-11-08 DIAGNOSIS — S70.01XA HEMATOMA OF RIGHT HIP, INITIAL ENCOUNTER: ICD-10-CM

## 2023-11-08 DIAGNOSIS — S32.511A CLOSED FRACTURE OF SUPERIOR RAMUS OF RIGHT PUBIS, INITIAL ENCOUNTER (H): ICD-10-CM

## 2023-11-08 DIAGNOSIS — Q78.2 BONY SCLEROSIS: ICD-10-CM

## 2023-11-08 DIAGNOSIS — S32.9XXA CLOSED NONDISPLACED FRACTURE OF PELVIS, UNSPECIFIED PART OF PELVIS, INITIAL ENCOUNTER (H): Primary | ICD-10-CM

## 2023-11-08 DIAGNOSIS — M19.011 ARTHRITIS OF RIGHT GLENOHUMERAL JOINT: ICD-10-CM

## 2023-11-08 LAB
ANION GAP SERPL CALCULATED.3IONS-SCNC: 8 MMOL/L (ref 7–15)
BASOPHILS # BLD AUTO: 0.1 10E3/UL (ref 0–0.2)
BASOPHILS NFR BLD AUTO: 1 %
BUN SERPL-MCNC: 9.8 MG/DL (ref 6–20)
CALCIUM SERPL-MCNC: 9.1 MG/DL (ref 8.6–10)
CHLORIDE SERPL-SCNC: 102 MMOL/L (ref 98–107)
CREAT SERPL-MCNC: 0.61 MG/DL (ref 0.51–0.95)
DEPRECATED HCO3 PLAS-SCNC: 31 MMOL/L (ref 22–29)
EGFRCR SERPLBLD CKD-EPI 2021: >90 ML/MIN/1.73M2
EOSINOPHIL # BLD AUTO: 0 10E3/UL (ref 0–0.7)
EOSINOPHIL NFR BLD AUTO: 0 %
ERYTHROCYTE [DISTWIDTH] IN BLOOD BY AUTOMATED COUNT: 12.4 % (ref 10–15)
GLUCOSE BLDC GLUCOMTR-MCNC: 75 MG/DL (ref 70–99)
GLUCOSE SERPL-MCNC: 93 MG/DL (ref 70–99)
HCT VFR BLD AUTO: 40.2 % (ref 35–47)
HGB BLD-MCNC: 13.5 G/DL (ref 11.7–15.7)
IMM GRANULOCYTES # BLD: 0 10E3/UL
IMM GRANULOCYTES NFR BLD: 0 %
LYMPHOCYTES # BLD AUTO: 2.3 10E3/UL (ref 0.8–5.3)
LYMPHOCYTES NFR BLD AUTO: 18 %
MCH RBC QN AUTO: 33.6 PG (ref 26.5–33)
MCHC RBC AUTO-ENTMCNC: 33.6 G/DL (ref 31.5–36.5)
MCV RBC AUTO: 100 FL (ref 78–100)
MONOCYTES # BLD AUTO: 1.2 10E3/UL (ref 0–1.3)
MONOCYTES NFR BLD AUTO: 9 %
NEUTROPHILS # BLD AUTO: 9.1 10E3/UL (ref 1.6–8.3)
NEUTROPHILS NFR BLD AUTO: 72 %
NRBC # BLD AUTO: 0 10E3/UL
NRBC BLD AUTO-RTO: 0 /100
PLATELET # BLD AUTO: 296 10E3/UL (ref 150–450)
POTASSIUM SERPL-SCNC: 4.3 MMOL/L (ref 3.4–5.3)
RBC # BLD AUTO: 4.02 10E6/UL (ref 3.8–5.2)
SODIUM SERPL-SCNC: 141 MMOL/L (ref 135–145)
WBC # BLD AUTO: 12.7 10E3/UL (ref 4–11)

## 2023-11-08 PROCEDURE — 72192 CT PELVIS W/O DYE: CPT

## 2023-11-08 PROCEDURE — 250N000013 HC RX MED GY IP 250 OP 250 PS 637: Performed by: INTERNAL MEDICINE

## 2023-11-08 PROCEDURE — 73502 X-RAY EXAM HIP UNI 2-3 VIEWS: CPT

## 2023-11-08 PROCEDURE — 99284 EMERGENCY DEPT VISIT MOD MDM: CPT | Performed by: PHYSICIAN ASSISTANT

## 2023-11-08 PROCEDURE — 82962 GLUCOSE BLOOD TEST: CPT

## 2023-11-08 PROCEDURE — 73030 X-RAY EXAM OF SHOULDER: CPT | Mod: RT

## 2023-11-08 PROCEDURE — 99207 PR NOT IN PERSON INPATIENT CONSULT STATISTICAL MARKER: CPT | Performed by: INTERNAL MEDICINE

## 2023-11-08 PROCEDURE — 250N000011 HC RX IP 250 OP 636: Mod: JZ | Performed by: INTERNAL MEDICINE

## 2023-11-08 PROCEDURE — 85014 HEMATOCRIT: CPT | Performed by: PHYSICIAN ASSISTANT

## 2023-11-08 PROCEDURE — 250N000013 HC RX MED GY IP 250 OP 250 PS 637: Performed by: PHYSICIAN ASSISTANT

## 2023-11-08 PROCEDURE — 80048 BASIC METABOLIC PNL TOTAL CA: CPT | Performed by: PHYSICIAN ASSISTANT

## 2023-11-08 PROCEDURE — 96372 THER/PROPH/DIAG INJ SC/IM: CPT | Performed by: INTERNAL MEDICINE

## 2023-11-08 PROCEDURE — 99285 EMERGENCY DEPT VISIT HI MDM: CPT | Mod: 25 | Performed by: PHYSICIAN ASSISTANT

## 2023-11-08 PROCEDURE — G0378 HOSPITAL OBSERVATION PER HR: HCPCS

## 2023-11-08 PROCEDURE — 36415 COLL VENOUS BLD VENIPUNCTURE: CPT | Performed by: PHYSICIAN ASSISTANT

## 2023-11-08 RX ORDER — HYDROXYZINE PAMOATE 25 MG/1
25 CAPSULE ORAL 3 TIMES DAILY PRN
Qty: 15 CAPSULE | Refills: 0 | Status: SHIPPED | OUTPATIENT
Start: 2023-11-08 | End: 2024-06-12

## 2023-11-08 RX ORDER — DIPHENHYDRAMINE HYDROCHLORIDE 50 MG/ML
25 INJECTION INTRAMUSCULAR; INTRAVENOUS
Status: DISCONTINUED | OUTPATIENT
Start: 2023-11-08 | End: 2023-11-09 | Stop reason: HOSPADM

## 2023-11-08 RX ORDER — LAMOTRIGINE 100 MG/1
200 TABLET ORAL ONCE
Status: COMPLETED | OUTPATIENT
Start: 2023-11-08 | End: 2023-11-08

## 2023-11-08 RX ORDER — OLANZAPINE 2.5 MG/1
5 TABLET, FILM COATED ORAL AT BEDTIME
Status: DISCONTINUED | OUTPATIENT
Start: 2023-11-08 | End: 2023-11-09 | Stop reason: HOSPADM

## 2023-11-08 RX ORDER — NALOXONE HYDROCHLORIDE 0.4 MG/ML
0.2 INJECTION, SOLUTION INTRAMUSCULAR; INTRAVENOUS; SUBCUTANEOUS
Status: DISCONTINUED | OUTPATIENT
Start: 2023-11-08 | End: 2023-11-09 | Stop reason: HOSPADM

## 2023-11-08 RX ORDER — HYDROMORPHONE HCL IN WATER/PF 6 MG/30 ML
0.4 PATIENT CONTROLLED ANALGESIA SYRINGE INTRAVENOUS
Status: DISCONTINUED | OUTPATIENT
Start: 2023-11-08 | End: 2023-11-09 | Stop reason: HOSPADM

## 2023-11-08 RX ORDER — HYDROCODONE BITARTRATE AND ACETAMINOPHEN 5; 325 MG/1; MG/1
1 TABLET ORAL ONCE
Status: COMPLETED | OUTPATIENT
Start: 2023-11-08 | End: 2023-11-08

## 2023-11-08 RX ORDER — ECHINACEA PURPUREA EXTRACT 125 MG
1 TABLET ORAL
Status: DISCONTINUED | OUTPATIENT
Start: 2023-11-08 | End: 2023-11-09 | Stop reason: HOSPADM

## 2023-11-08 RX ORDER — UREA 10 %
1000 LOTION (ML) TOPICAL DAILY
Status: DISCONTINUED | OUTPATIENT
Start: 2023-11-09 | End: 2023-11-09 | Stop reason: HOSPADM

## 2023-11-08 RX ORDER — NALOXONE HYDROCHLORIDE 0.4 MG/ML
0.4 INJECTION, SOLUTION INTRAMUSCULAR; INTRAVENOUS; SUBCUTANEOUS
Status: DISCONTINUED | OUTPATIENT
Start: 2023-11-08 | End: 2023-11-09 | Stop reason: HOSPADM

## 2023-11-08 RX ORDER — POLYETHYLENE GLYCOL 3350 17 G/17G
17 POWDER, FOR SOLUTION ORAL ONCE
Status: COMPLETED | OUTPATIENT
Start: 2023-11-08 | End: 2023-11-08

## 2023-11-08 RX ORDER — DOCUSATE SODIUM 100 MG/1
100 CAPSULE, LIQUID FILLED ORAL 2 TIMES DAILY PRN
Status: DISCONTINUED | OUTPATIENT
Start: 2023-11-08 | End: 2023-11-09 | Stop reason: HOSPADM

## 2023-11-08 RX ORDER — HYDROCODONE BITARTRATE AND ACETAMINOPHEN 5; 325 MG/1; MG/1
1 TABLET ORAL EVERY 6 HOURS PRN
Qty: 12 TABLET | Refills: 0 | Status: SHIPPED | OUTPATIENT
Start: 2023-11-08 | End: 2023-11-09

## 2023-11-08 RX ORDER — LACOSAMIDE 100 MG/1
100 TABLET ORAL 2 TIMES DAILY
Status: DISCONTINUED | OUTPATIENT
Start: 2023-11-08 | End: 2023-11-09

## 2023-11-08 RX ORDER — POLYETHYLENE GLYCOL 3350 17 G/17G
17 POWDER, FOR SOLUTION ORAL DAILY
Status: DISCONTINUED | OUTPATIENT
Start: 2023-11-09 | End: 2023-11-09 | Stop reason: HOSPADM

## 2023-11-08 RX ORDER — LEVOTHYROXINE SODIUM 50 UG/1
50 TABLET ORAL
Status: DISCONTINUED | OUTPATIENT
Start: 2023-11-09 | End: 2023-11-09 | Stop reason: HOSPADM

## 2023-11-08 RX ORDER — POTASSIUM CHLORIDE 1500 MG/1
20 TABLET, EXTENDED RELEASE ORAL DAILY
Status: DISCONTINUED | OUTPATIENT
Start: 2023-11-09 | End: 2023-11-09 | Stop reason: HOSPADM

## 2023-11-08 RX ORDER — HEPARIN SODIUM 5000 [USP'U]/.5ML
5000 INJECTION, SOLUTION INTRAVENOUS; SUBCUTANEOUS EVERY 12 HOURS
Status: DISCONTINUED | OUTPATIENT
Start: 2023-11-08 | End: 2023-11-09

## 2023-11-08 RX ORDER — VITAMIN B COMPLEX
50 TABLET ORAL DAILY
Status: DISCONTINUED | OUTPATIENT
Start: 2023-11-09 | End: 2023-11-09 | Stop reason: HOSPADM

## 2023-11-08 RX ORDER — DIPHENHYDRAMINE HCL 25 MG
25 CAPSULE ORAL
Status: DISCONTINUED | OUTPATIENT
Start: 2023-11-08 | End: 2023-11-09 | Stop reason: HOSPADM

## 2023-11-08 RX ORDER — ACETAMINOPHEN 325 MG/1
650 TABLET ORAL EVERY 6 HOURS PRN
Status: DISCONTINUED | OUTPATIENT
Start: 2023-11-08 | End: 2023-11-09 | Stop reason: HOSPADM

## 2023-11-08 RX ORDER — ONDANSETRON 2 MG/ML
4 INJECTION INTRAMUSCULAR; INTRAVENOUS EVERY 6 HOURS PRN
Status: DISCONTINUED | OUTPATIENT
Start: 2023-11-08 | End: 2023-11-09 | Stop reason: HOSPADM

## 2023-11-08 RX ORDER — FLUTICASONE PROPIONATE 50 MCG
1 SPRAY, SUSPENSION (ML) NASAL DAILY
Status: DISCONTINUED | OUTPATIENT
Start: 2023-11-09 | End: 2023-11-09 | Stop reason: HOSPADM

## 2023-11-08 RX ORDER — ACETAMINOPHEN 650 MG/1
650 SUPPOSITORY RECTAL EVERY 6 HOURS PRN
Status: DISCONTINUED | OUTPATIENT
Start: 2023-11-08 | End: 2023-11-09 | Stop reason: HOSPADM

## 2023-11-08 RX ORDER — LAMOTRIGINE 100 MG/1
200 TABLET ORAL 2 TIMES DAILY
Status: DISCONTINUED | OUTPATIENT
Start: 2023-11-08 | End: 2023-11-09 | Stop reason: HOSPADM

## 2023-11-08 RX ORDER — OXYCODONE HYDROCHLORIDE 5 MG/1
5 TABLET ORAL EVERY 4 HOURS PRN
Status: DISCONTINUED | OUTPATIENT
Start: 2023-11-08 | End: 2023-11-09 | Stop reason: HOSPADM

## 2023-11-08 RX ORDER — LANOLIN ALCOHOL/MO/W.PET/CERES
3 CREAM (GRAM) TOPICAL
Status: DISCONTINUED | OUTPATIENT
Start: 2023-11-08 | End: 2023-11-09 | Stop reason: HOSPADM

## 2023-11-08 RX ORDER — ONDANSETRON 4 MG/1
4 TABLET, ORALLY DISINTEGRATING ORAL EVERY 6 HOURS PRN
Status: DISCONTINUED | OUTPATIENT
Start: 2023-11-08 | End: 2023-11-09 | Stop reason: HOSPADM

## 2023-11-08 RX ORDER — KETOROLAC TROMETHAMINE 15 MG/ML
15 INJECTION, SOLUTION INTRAMUSCULAR; INTRAVENOUS EVERY 6 HOURS PRN
Status: DISCONTINUED | OUTPATIENT
Start: 2023-11-08 | End: 2023-11-09

## 2023-11-08 RX ADMIN — OXYCODONE HYDROCHLORIDE 5 MG: 5 TABLET ORAL at 23:00

## 2023-11-08 RX ADMIN — HYDROCODONE BITARTRATE AND ACETAMINOPHEN 1 TABLET: 5; 325 TABLET ORAL at 15:33

## 2023-11-08 RX ADMIN — LACOSAMIDE 100 MG: 100 TABLET, FILM COATED ORAL at 22:29

## 2023-11-08 RX ADMIN — DOCUSATE SODIUM 100 MG: 100 CAPSULE, LIQUID FILLED ORAL at 23:00

## 2023-11-08 RX ADMIN — POLYETHYLENE GLYCOL 3350 17 G: 17 POWDER, FOR SOLUTION ORAL at 19:05

## 2023-11-08 RX ADMIN — HEPARIN SODIUM 5000 UNITS: 10000 INJECTION, SOLUTION INTRAVENOUS; SUBCUTANEOUS at 22:33

## 2023-11-08 RX ADMIN — LAMOTRIGINE 200 MG: 100 TABLET ORAL at 19:05

## 2023-11-08 RX ADMIN — OLANZAPINE 5 MG: 2.5 TABLET, FILM COATED ORAL at 22:29

## 2023-11-08 ASSESSMENT — ACTIVITIES OF DAILY LIVING (ADL)
ADLS_ACUITY_SCORE: 35
ADLS_ACUITY_SCORE: 35
ADLS_ACUITY_SCORE: 26
ADLS_ACUITY_SCORE: 26
ADLS_ACUITY_SCORE: 35
ADLS_ACUITY_SCORE: 35

## 2023-11-08 NOTE — DISCHARGE INSTRUCTIONS
-Use Norco and Vistaril for pain.  -Follow-up with orthopedics for your shoulder and hip  -Follow-up with ENT for your ear and follow-up with neurology for your seizure history  -Return to the ER for any worsening of symptoms.

## 2023-11-08 NOTE — ED TRIAGE NOTES
Patient presents to ED via ambulance after fall at [employer]. Patient states she had stood up from putting on shoes and tripped over bench. Patient states she anded on right side but did not hit head. Patient does not report taking blood thinners. Pain 5/10 after 25 mch fentanyl.     Triage Assessment (Adult)       Row Name 11/08/23 1247          Triage Assessment    Airway WDL WDL        Respiratory WDL    Respiratory WDL WDL        Skin Circulation/Temperature WDL    Skin Circulation/Temperature WDL WDL        Cardiac WDL    Cardiac WDL WDL        Peripheral/Neurovascular WDL    Peripheral Neurovascular WDL WDL        Cognitive/Neuro/Behavioral WDL    Cognitive/Neuro/Behavioral WDL WDL        Huron Coma Scale    Best Eye Response 4-->(E4) spontaneous     Best Motor Response 6-->(M6) obeys commands     Best Verbal Response 5-->(V5) oriented     [place] Coma Scale Score 15     Assessment Qualifiers no eye obstruction present

## 2023-11-09 ENCOUNTER — APPOINTMENT (OUTPATIENT)
Dept: PHYSICAL THERAPY | Facility: OTHER | Age: 48
End: 2023-11-09
Attending: INTERNAL MEDICINE
Payer: OTHER GOVERNMENT

## 2023-11-09 ENCOUNTER — APPOINTMENT (OUTPATIENT)
Dept: OCCUPATIONAL THERAPY | Facility: OTHER | Age: 48
End: 2023-11-09
Attending: INTERNAL MEDICINE
Payer: OTHER GOVERNMENT

## 2023-11-09 VITALS
HEIGHT: 66 IN | BODY MASS INDEX: 15.69 KG/M2 | DIASTOLIC BLOOD PRESSURE: 56 MMHG | RESPIRATION RATE: 16 BRPM | SYSTOLIC BLOOD PRESSURE: 110 MMHG | HEART RATE: 70 BPM | OXYGEN SATURATION: 98 % | TEMPERATURE: 97.2 F | WEIGHT: 97.6 LBS

## 2023-11-09 PROBLEM — G40.909 SEIZURE DISORDER (H): Status: ACTIVE | Noted: 2023-11-08

## 2023-11-09 LAB
ANION GAP SERPL CALCULATED.3IONS-SCNC: 5 MMOL/L (ref 7–15)
BUN SERPL-MCNC: 10.5 MG/DL (ref 6–20)
CALCIUM SERPL-MCNC: 8.5 MG/DL (ref 8.6–10)
CHLORIDE SERPL-SCNC: 106 MMOL/L (ref 98–107)
CREAT SERPL-MCNC: 0.68 MG/DL (ref 0.51–0.95)
DEPRECATED HCO3 PLAS-SCNC: 27 MMOL/L (ref 22–29)
EGFRCR SERPLBLD CKD-EPI 2021: >90 ML/MIN/1.73M2
ERYTHROCYTE [DISTWIDTH] IN BLOOD BY AUTOMATED COUNT: 12.6 % (ref 10–15)
GLUCOSE SERPL-MCNC: 82 MG/DL (ref 70–99)
HCT VFR BLD AUTO: 37.2 % (ref 35–47)
HGB BLD-MCNC: 12.4 G/DL (ref 11.7–15.7)
HOLD SPECIMEN: NORMAL
HOLD SPECIMEN: NORMAL
INR PPP: 0.99 (ref 0.85–1.15)
MCH RBC QN AUTO: 34 PG (ref 26.5–33)
MCHC RBC AUTO-ENTMCNC: 33.3 G/DL (ref 31.5–36.5)
MCV RBC AUTO: 102 FL (ref 78–100)
PLATELET # BLD AUTO: 255 10E3/UL (ref 150–450)
POTASSIUM SERPL-SCNC: 5.1 MMOL/L (ref 3.4–5.3)
RBC # BLD AUTO: 3.65 10E6/UL (ref 3.8–5.2)
SODIUM SERPL-SCNC: 138 MMOL/L (ref 135–145)
WBC # BLD AUTO: 7.9 10E3/UL (ref 4–11)

## 2023-11-09 PROCEDURE — 96374 THER/PROPH/DIAG INJ IV PUSH: CPT

## 2023-11-09 PROCEDURE — 80048 BASIC METABOLIC PNL TOTAL CA: CPT | Performed by: INTERNAL MEDICINE

## 2023-11-09 PROCEDURE — G0378 HOSPITAL OBSERVATION PER HR: HCPCS

## 2023-11-09 PROCEDURE — 97165 OT EVAL LOW COMPLEX 30 MIN: CPT | Mod: GO | Performed by: OCCUPATIONAL THERAPIST

## 2023-11-09 PROCEDURE — 99233 SBSQ HOSP IP/OBS HIGH 50: CPT | Performed by: INTERNAL MEDICINE

## 2023-11-09 PROCEDURE — 96375 TX/PRO/DX INJ NEW DRUG ADDON: CPT

## 2023-11-09 PROCEDURE — 250N000013 HC RX MED GY IP 250 OP 250 PS 637: Performed by: INTERNAL MEDICINE

## 2023-11-09 PROCEDURE — 250N000011 HC RX IP 250 OP 636: Mod: JZ | Performed by: INTERNAL MEDICINE

## 2023-11-09 PROCEDURE — 85014 HEMATOCRIT: CPT | Performed by: INTERNAL MEDICINE

## 2023-11-09 PROCEDURE — 97535 SELF CARE MNGMENT TRAINING: CPT | Mod: GO | Performed by: OCCUPATIONAL THERAPIST

## 2023-11-09 PROCEDURE — 36415 COLL VENOUS BLD VENIPUNCTURE: CPT | Performed by: INTERNAL MEDICINE

## 2023-11-09 PROCEDURE — 97116 GAIT TRAINING THERAPY: CPT | Mod: GP

## 2023-11-09 PROCEDURE — 96372 THER/PROPH/DIAG INJ SC/IM: CPT | Mod: XU | Performed by: INTERNAL MEDICINE

## 2023-11-09 PROCEDURE — 97161 PT EVAL LOW COMPLEX 20 MIN: CPT | Mod: GP

## 2023-11-09 PROCEDURE — 97530 THERAPEUTIC ACTIVITIES: CPT | Mod: GP

## 2023-11-09 PROCEDURE — 85610 PROTHROMBIN TIME: CPT | Performed by: INTERNAL MEDICINE

## 2023-11-09 RX ORDER — OMEPRAZOLE 20 MG/1
20 TABLET, DELAYED RELEASE ORAL DAILY
COMMUNITY
End: 2024-05-10

## 2023-11-09 RX ORDER — ACETAMINOPHEN 500 MG
1000 TABLET ORAL EVERY 6 HOURS PRN
COMMUNITY
Start: 2023-11-09 | End: 2024-06-12 | Stop reason: ALTCHOICE

## 2023-11-09 RX ORDER — IBUPROFEN 200 MG
400 TABLET ORAL EVERY 6 HOURS PRN
COMMUNITY
Start: 2023-11-09 | End: 2024-06-12

## 2023-11-09 RX ORDER — MULTIVITAMIN,THERAPEUTIC
1 TABLET ORAL DAILY
COMMUNITY
End: 2024-06-12

## 2023-11-09 RX ORDER — IBUPROFEN 400 MG/1
400 TABLET, FILM COATED ORAL EVERY 6 HOURS PRN
Status: DISCONTINUED | OUTPATIENT
Start: 2023-11-09 | End: 2023-11-09 | Stop reason: HOSPADM

## 2023-11-09 RX ORDER — LACOSAMIDE 50 MG/1
100 TABLET ORAL 2 TIMES DAILY
Status: DISCONTINUED | OUTPATIENT
Start: 2023-11-09 | End: 2023-11-09 | Stop reason: HOSPADM

## 2023-11-09 RX ORDER — FOLIC ACID 1 MG/1
1 TABLET ORAL DAILY
Status: DISCONTINUED | OUTPATIENT
Start: 2023-11-09 | End: 2023-11-09 | Stop reason: HOSPADM

## 2023-11-09 RX ORDER — OXYCODONE HYDROCHLORIDE 5 MG/1
5 TABLET ORAL EVERY 4 HOURS PRN
Qty: 20 TABLET | Refills: 0 | Status: SHIPPED | OUTPATIENT
Start: 2023-11-09 | End: 2024-02-23

## 2023-11-09 RX ORDER — IBUPROFEN 400 MG/1
400 TABLET, FILM COATED ORAL EVERY 6 HOURS PRN
COMMUNITY
Start: 2023-11-09 | End: 2023-11-09

## 2023-11-09 RX ORDER — DOCUSATE SODIUM 100 MG/1
100 CAPSULE, LIQUID FILLED ORAL 2 TIMES DAILY
COMMUNITY
End: 2024-02-22

## 2023-11-09 RX ADMIN — KETOROLAC TROMETHAMINE 15 MG: 15 INJECTION, SOLUTION INTRAMUSCULAR; INTRAVENOUS at 04:28

## 2023-11-09 RX ADMIN — HYDROMORPHONE HYDROCHLORIDE 0.4 MG: 0.2 INJECTION, SOLUTION INTRAMUSCULAR; INTRAVENOUS; SUBCUTANEOUS at 00:14

## 2023-11-09 RX ADMIN — HEPARIN SODIUM 5000 UNITS: 10000 INJECTION, SOLUTION INTRAVENOUS; SUBCUTANEOUS at 10:37

## 2023-11-09 RX ADMIN — MELATONIN 3 MG: 3 TAB ORAL at 00:24

## 2023-11-09 RX ADMIN — SALINE NASAL SPRAY 1 SPRAY: 1.5 SOLUTION NASAL at 10:42

## 2023-11-09 RX ADMIN — CYANOCOBALAMIN TAB 500 MCG 1000 MCG: 500 TAB at 10:45

## 2023-11-09 RX ADMIN — LAMOTRIGINE 200 MG: 100 TABLET ORAL at 10:34

## 2023-11-09 RX ADMIN — LACOSAMIDE 100 MG: 50 TABLET, FILM COATED ORAL at 11:28

## 2023-11-09 RX ADMIN — POLYETHYLENE GLYCOL 3350 17 G: 17 POWDER, FOR SOLUTION ORAL at 10:33

## 2023-11-09 RX ADMIN — POTASSIUM CHLORIDE 20 MEQ: 1500 TABLET, EXTENDED RELEASE ORAL at 10:46

## 2023-11-09 RX ADMIN — Medication 1 TABLET: at 10:35

## 2023-11-09 RX ADMIN — DIPHENHYDRAMINE HYDROCHLORIDE 25 MG: 25 CAPSULE ORAL at 00:24

## 2023-11-09 RX ADMIN — OXYCODONE HYDROCHLORIDE 5 MG: 5 TABLET ORAL at 11:34

## 2023-11-09 RX ADMIN — ACETAMINOPHEN 650 MG: 325 TABLET, FILM COATED ORAL at 11:34

## 2023-11-09 RX ADMIN — FOLIC ACID 1 MG: 1 TABLET ORAL at 10:46

## 2023-11-09 RX ADMIN — Medication 50 MCG: at 10:34

## 2023-11-09 RX ADMIN — LEVOTHYROXINE SODIUM 50 MCG: 0.05 TABLET ORAL at 08:25

## 2023-11-09 RX ADMIN — THIAMINE HCL TAB 100 MG 100 MG: 100 TAB at 10:34

## 2023-11-09 ASSESSMENT — ACTIVITIES OF DAILY LIVING (ADL)
ADLS_ACUITY_SCORE: 36
ADLS_ACUITY_SCORE: 31
ADLS_ACUITY_SCORE: 36

## 2023-11-09 NOTE — PHARMACY - DISCHARGE MEDICATION RECONCILIATION AND EDUCATION
Pharmacy:  Discharge Counseling and Medication Reconciliation    Sara DOWLING Deandra  [address] [facility]  [place] [id number] [phone] (home)   48 year old female  PCP: Tammy Rhoades    Allergies: Fluoxetine and Amoxicillin    Discharge Counseling:    Pharmacist met with patient (and/or family) today to review the medication portion of the After Visit Summary (with an emphasis on NEW medications) and to address patient's questions/concerns.    Summary of Education: Discussed PRN use for pain, side effects of drowsiness/dizziness and constipation. Discussed abstaining from alcohol and driving while on medication. Also discussed keeping out of reach of pets/kids. Discussed PRN use of stool softeners should patient get constipation.   -Confirmed medications returned to patient    Materials Provided:  MedCounselor sheets printed from Clinical Pharmacology on: [employer]    Discharge Medication Reconciliation:    It has been determined that the patient has an adequate supply of medications available or which can be obtained from the patient's preferred pharmacy, which HE/SHE has confirmed as: [facility]    Thank you for the consult.    Marci Denny RPH........November 9, 2023 2:37 PM

## 2023-11-09 NOTE — PROGRESS NOTES
11/09/23 1258   Appointment Info   Signing Clinician's Name / Credentials (PT) Hugo Yeison MPT   Living Environment   People in Home alone   Current Living Arrangements house   Home Accessibility no concerns;stairs to enter home;stairs within home   Number of Stairs, Main Entrance 5   Stair Railings, Main Entrance railings safe and in good condition;railing on right side (ascending)   Number of Stairs, Within Home, Primary greater than 10 stairs  (to basement)   Stair Railings, Within Home, Primary railings safe and in good condition   Transportation Anticipated family or friend will provide   Self-Care   Usual Activity Tolerance good   Current Activity Tolerance fair   Equipment Currently Used at Home none   Fall history within last six months yes   Number of times patient has fallen within last six months 0   Activity/Exercise/Self-Care Comment 1   General Information   Referring Physician Leroy   Patient/Family Therapy Goals Statement (PT) return home   Existing Precautions/Restrictions fall  (right side superior and inferior rami fxs)   Weight-Bearing Status - LLE full weight-bearing   Weight-Bearing Status - RLE weight-bearing as tolerated   Cognition   Affect/Mental Status (Cognition) WFL   Orientation Status (Cognition) oriented x 4   Follows Commands (Cognition) WFL   Pain Assessment   Patient Currently in Pain Yes, see Vital Sign flowsheet   Integumentary/Edema   Integumentary/Edema no deficits were identifed   Posture    Posture Not impaired   Range of Motion (ROM)   Range of Motion ROM is WFL   Strength (Manual Muscle Testing)   Strength (Manual Muscle Testing) strength is WFL   Strength Comments however, patient fatigues with activity   Bed Mobility   Comment, (Bed Mobility) SBA   Transfers   Impairments Contributing to Impaired Transfers pain   Comment, (Transfers) CGA to SBA using a Fww   Gait/Stairs (Locomotion)   [place] Level (Gait) contact guard   Assistive Device (Gait) walker,  front-wheeled   Distance in Feet (Gait) 75   Pattern (Gait) 3-point;step-to   Balance   Balance Comments good with Fww   Sensory Examination   Sensory Perception WFL   Coordination   Coordination no deficits were identified   Muscle Tone   Muscle Tone no deficits were identified   Clinical Impression   Criteria for Skilled Therapeutic Intervention Evaluation only   PT Diagnosis (PT) impaired mobility   Influenced by the following impairments pain and fatigue   Functional limitations due to impairments activity/gait tolerance and stability   Clinical Presentation (PT Evaluation Complexity) stable   Clinical Decision Making (Complexity) low complexity   Risk & Benefits of therapy have been explained evaluation/treatment results reviewed;patient   PT Total Evaluation Time   PT Eval, Low Complexity Minutes (19341) 15   Physical Therapy Goals   PT Frequency One time eval and treatment only   PT Discharge Planning   PT Plan patient discharging   PT Discharge Recommendation (DC Rec) home with assist   PT Rationale for DC Rec to assist patient with ADLs   PT Brief overview of current status patient's c/o right side pelvic pain decreasing activity tolerance; Fww sent home with patient for her use; patient also given Joint Replacement Guide for instruction with use of Fww, performing sit<>stand, shower/tub transfer and bed mobilities   PT Equipment Needed at Discharge walker, rolling   Total Session Time   Total Session Time (sum of timed and untimed services) 15

## 2023-11-09 NOTE — H&P
"[facility]    History and Physical - Hospitalist Service       Date of Admission:  11/8/2023    Assessment & Plan      Sara Liriano is a 48 year old female admitted on 11/8/2023. She presented to the ED after a fall and was found to have non-displaced fractures of her right superior and inferior pubic rami.     Right superior and inferior pubic rami fractures  The patient had a mechanical fall and has non-displaced fractures of her right superior and inferior pubic rami. No surgical intervention is necessary for this type of fracture but she has pain.   - observation status  - PT/OT  - tiered analgesics  - symptom management    Seizure disorder  Last seizure was a month ago.   - c/w lacosamide and lamotrigine    PTSD        Atypical Depression  - c/w olanzapine for sleep    Hypothyroidism  - c/w levothyroxine       Diet: Combination Diet Regular Diet Adult    DVT Prophylaxis: Heparin SQ  Saleh Catheter: Not present  Lines: None     Code Status: Full Code      Clinically Significant Risk Factors Present on Admission                       # Cachexia: Estimated body mass index is 16.14 kg/m  as calculated from the following:    Height as of this encounter: 1.676 m (5' 6\").    Weight as of this encounter: 45.4 kg (100 lb).              Disposition Plan      Expected Discharge Date: 11/09/2023                The patient's care was discussed with the Patient.        Jaya Mendez MD  [facility]  Securely message with the Vocera Web Console (learn more here)  Text page via [facility] Paging/Directory      Visit/Communication Style   Virtual (Video) communication was used to evaluate Sara.  Sara consented to the use of video communication: yes  Video START time: 2050, 11/8/2023  Video STOP time: 2120, 11/8/2023   Patient's location: [facility]   Provider's location during the visit: [facility]-medicine site  "       ______________________________________________________________________    Chief Complaint   Mechanical fall, hip pain    History is obtained from the patient    History of Present Illness   Sara Liriano is a 48 year old female who presented to the ED after a mechanical fall. She states that she had just finished exercising at the gymnasium when she was careless whilst packing up and tripped and fell on her right side. She felt 10/10 pain at first but it subsided and was able to stand up initially and walk. However the pain rapidly worsened and she had to stop and EMS was called. She denies any syncope, seizure-like activity or other cause for her fall. She has no prior history of recurrent fractures but she does have osteopenia. .     Review of Systems    General: negative for fever, chills, sweats, weakness  Eyes: negative for blurred vision, loss of vision  Ear Nose and Throat: negative for pharyngitis, speech or swallowing difficulties  Respiratory:  negative for sputum production, wheezing, MOREIRA, pleuritic pain, sob or cough  Cardiology:  negative for chest pain, palpitations, orthopnea, PND, edema, syncope   Gastrointestinal: negative for abdominal pain, nausea, vomiting, diarrhea, constipation, hematemesis, melena or hematochezia  Genitourinary: negative for frequency, urgency, dysuria, hematuria   Neurological: negative for focal weakness, paresthesia    Past Medical History    I have reviewed this patient's medical history and updated it with pertinent information if needed.   Past Medical History:   Diagnosis Date    Anorexia     Atypical depression     Hypothyroidism     Intracranial hemorrhage (H) 2018    Osteopenia     PTSD (post-traumatic stress disorder)     Seizure disorder (H)     TBI (traumatic brain injury) (H) 05/2018       Past Surgical History   I have reviewed this patient's surgical history and updated it with pertinent information if needed.  Past Surgical History:   Procedure  Laterality Date    CRANIOPLASTY  08/08/2018    CRANIOTOMY  05/2018    TRACHEOSTOMY  06/2018       Social History   I have reviewed this patient's social history and updated it with pertinent information if needed.  Social History     Tobacco Use    Smoking status: Never     Passive exposure: Never    Smokeless tobacco: Never   Vaping Use    Vaping Use: Never used   Substance Use Topics    Alcohol use: Yes     Comment: Alcoholic Drinks/day: Occassional    Drug use: No     Comment: Drug use: No       Family History   I have reviewed this patient's family history and updated it with pertinent information if needed.  Family History   Problem Relation Age of Onset    Hypertension Mother     Heart Failure Father     Diabetes Father     Hypertension Sister        Prior to Admission Medications   Prior to Admission Medications   Prescriptions Last Dose Informant Patient Reported? Taking?   Folic Acid-Vit B6-Vit B12 (FOLBEE) 2.5-25-1 MG TABS 11/8/2023  Yes Yes   Sig: Take 1 tablet by mouth daily   IBANdronate (BONIVA) 150 MG tablet 11/8/2023  Yes Yes   Sig: Take 150 mg by mouth every 30 days   Lacosamide (VIMPAT) 100 MG TABS tablet 11/8/2023  No Yes   Sig: Take 1 tablet (100 mg) by mouth 2 times daily   Nutritional Supplements (ENSURE HIGH PROTEIN) Unknown  Yes Yes   Sig: Take  by mouth.   OLANZapine (ZYPREXA) 5 MG tablet 11/8/2023  Yes Yes   calcium carbonate (OS-MOISES) 500 MG tablet 11/8/2023  No Yes   Sig: Take 1 tablet (500 mg) by mouth daily   cyanocobalamin (VITAMIN B-12) 1000 MCG tablet 11/8/2023  No Yes   Sig: Take 1 tablet (1,000 mcg) by mouth daily   docusate sodium (COLACE) 100 MG capsule Past Week  No Yes   Sig: Take 1 capsule (100 mg) by mouth 2 times daily as needed for constipation   fluticasone (FLONASE) 50 MCG/ACT nasal spray 11/8/2023  No Yes   Sig: Spray 1 spray into both nostrils daily   lamoTRIgine (LAMICTAL) 200 MG tablet 11/8/2023  No Yes   Sig: Take 1 tablet (200 mg) by mouth 2 times daily  "  levothyroxine (SYNTHROID/LEVOTHROID) 50 MCG tablet 11/8/2023  No Yes   Sig: Take 1 tablet (50 mcg) at 7 am daily, 1 hour prior to eating and taking other medications   melatonin 3 MG tablet 11/7/2023  No Yes   Sig: Take 1 tablet (3 mg) by mouth nightly as needed for sleep   polyethylene glycol (MIRALAX) 17 GM/Dose powder 11/8/2023  No Yes   Sig: Take 17 g (1 Capful) by mouth daily   potassium chloride ER (KLOR-CON M) 20 MEQ CR tablet 11/8/2023  No Yes   Sig: Take 1 tablet (20 mEq) by mouth daily   sodium chloride (OCEAN) 0.65 % nasal spray 11/8/2023  No Yes   Sig: Use 1 spray into each nostril daily   thiamine (B-1) 100 MG tablet 11/8/2023  No Yes   Sig: Take 1 tablet (100 mg) by mouth daily   vitamin D3 (CHOLECALCIFEROL) 50 mcg (2000 units) tablet 11/8/2023  No Yes   Sig: Take 1 tablet (50 mcg) by mouth daily   zoledronic Acid (RECLAST) 5 MG/100ML SOLN infusion   Yes No   Sig: Inject 5 mg into the vein      Facility-Administered Medications: None     Allergies   Allergies   Allergen Reactions    Fluoxetine Other (See Comments) and Swelling     Blurred vision, heart racing, face swelling    Amoxicillin Other (See Comments)     \"Thrush\"       Physical Exam   Vital Signs: Temp: 99.7  F (37.6  C) Temp src: Tympanic BP: 115/79 Pulse: 77   Resp: 18 SpO2: 98 % O2 Device: None (Room air)    Weight: 100 lbs 0 oz    Gen:  Well-developed, cachectic, in no acute distress, lying semi-supine in hospital stretcher  HEENT:  Anicteric sclera, PER, hearing intact to voice  Resp:  No accessory muscle use, breath sounds clear; no wheezes no rales no rhonchi  Card:  No murmur, normal S1, S2   Abd:  Soft per RN exam, no TTP, non-distended, normoactive bowel sounds are present  Musc:  Pain with movement of the right leg/hip. Normal strength and movement of the major muscle groups without obvious deformity  Psych:  Good insight, oriented to person, place and time, not anxious, not agitated    Data     Recent Labs   Lab 11/08/23 1911 "   WBC 12.7*   HGB 13.5            POTASSIUM 4.3   CHLORIDE 102   CO2 31*   BUN 9.8   CR 0.61   ANIONGAP 8   MOISES 9.1   GLC 93         Recent Results (from the past 24 hour(s))   XR Pelvis and Hip Right 2 Views    Narrative    XR PELVIS AND HIP RIGHT 2 VIEWS    HISTORY: FALL .    COMPARISON: None.    TECHNIQUE: AP pelvis and 2 views right hip.    FINDINGS:    3 sclerotic foci in the right iliac bone are nonspecific. Correlation  with remote priors would be reassuring.    Osteopenia. No evidence of acute fracture.    Mild right hip osteoarthritis. Bilateral SI joint osteophytosis.      LALIT LINTON MD         SYSTEM ID:  [id number]   XR Shoulder Right G/E 3 Views    Narrative    PROCEDURE:  XR SHOULDER RIGHT G/E 3 VIEWS    HISTORY: fall.    COMPARISON:  None.    TECHNIQUE:  3 views right shoulder.    FINDINGS:  No fracture or dislocation is identified. Mild right  glenohumeral osteoarthritis. Mild AC joint hypertrophy. No foreign  body is seen.       Impression    IMPRESSION: No acute fracture.      LALIT LINTON MD         SYSTEM ID:  [id number]   CT Pelvis Bone wo Contrast    Narrative    EXAM: CT PELVIS BONE WO CONTRAST, 11/8/2023 4:40 PM    HISTORY: right hip pain after fall, difficulty with standing and  pivoting.  r/o fx    COMPARISON: Radiograph 11/8/2023    PROCEDURE:   Imaging protocol: Axial CT images with multiplanar reformats were  obtained of the pelvis without contrast.  Acquisition: This CT exam was performed using one or more the  following dose reduction techniques: automated exposure control,  adjustment of the mA and/or kV according to patient size, and/or  iterative reconstruction technique.    FINDINGS:    BONES AND SOFT TISSUE:  Osteopenia, mildly limits exam. Nondisplaced right superior pubic  ramus fracture. Focal cortical abnormality along the lateral aspect of  the right inferior pubic ramus, suggestive of nondisplaced fracture.  Joints are appropriately aligned.  Joint spaces are preserved. Three  sclerotic foci in the right iliac bone, largest measures up to 1.6 cm.    ABDOMEN AND PELVIS:  Fluid and air distended colon without dilatation. No small bowel  dilatation. No free fluid or free air. No aortic aneurysmal  dilatation. No pathologically enlarged lymph nodes.      Impression    IMPRESSION:  1.  Acute nondisplaced right superior and inferior pubic rami  fractures. Due to degree of osteopenia, follow-up MR pelvis may be  useful to rule out occult nondisplaced fractures.  2.  Prominent volume of fluid in the colon, could represent enteritis.    EBONIE RAGLAND MD         SYSTEM ID:  [id number]

## 2023-11-09 NOTE — PROGRESS NOTES
11/08/23 2001   Initial Information   Patient Belongings remains with patient   Patient Belongings Remaining with Patient vision aids;medication(s);jewelry;clothing;cell phone/electronics   Did you bring any home meds/supplements to the hospital?  Yes   Disposition of meds  Other (see comment)  (house supervisor)     A               Admission:  I am responsible for any personal items that are not sent to the safe or pharmacy.  [facility] is not responsible for loss, theft or damage of any property in my possession.    Signature:  _________________________________ Date: _______  Time: _____                                              Staff Signature:  ____________________________ Date: ________  Time: _____      2nd Staff person, if patient is unable/unwilling to sign:    Signature: ________________________________ Date: ________  Time: _____     Discharge:  [facility] has returned all of my personal belongings:    Signature: _________________________________ Date: ________  Time: _____                                          Staff Signature:  ____________________________ Date: ________  Time: _____

## 2023-11-09 NOTE — PHARMACY-ADMISSION MEDICATION HISTORY
Pharmacist Admission Medication History    Admission medication history is complete. The information provided in this note is only as accurate as the sources available at the time of the update.    Information Source(s): Patient via in-person, [facility] scripts, [facility] pharmacy    Pertinent Information: Patient states she does not take Ibandronate (Boniva) monthly.  Per chart review, this was prescribed 9/2023.  Patient preferred pharmacy [facility] called and they do not have this prescription on file.  Patient states she brought in a med list of all the medications she takes at home.  However, this was not available at the time of interview.  Boniva left on med list and will need clarification by primary provider.    Changes made to PTA medication list:  Added: multivitamin, omeprazole OTC  Deleted: levothyroxine 75 mcg (takes 50 mcg), zoledronic acid IV, Ensure, olanzapine  Changed: NONE    Medication Affordability:  Not including over the counter (OTC) medications, was there a time in the past 3 months when you did not take your medications as prescribed because of cost?: No    Allergies reviewed with patient and updates made in EHR: yes    Medication History Completed By: Sveta Goldstein RPH 11/9/2023 12:27 PM  PTA Med List   Medication Sig Last Dose    calcium carbonate (OS-MOISES) 500 MG tablet Take 1 tablet (500 mg) by mouth daily 11/8/2023 at AM    cyanocobalamin (VITAMIN B-12) 1000 MCG tablet Take 1 tablet (1,000 mcg) by mouth daily 11/8/2023 at AM    docusate sodium (COLACE) 100 MG capsule Take 100 mg by mouth 2 times daily 11/8/2023 at AM    fluticasone (FLONASE) 50 MCG/ACT nasal spray Spray 1 spray into both nostrils daily 11/8/2023 at AM    Folic Acid-Vit B6-Vit B12 (FOLBEE) 2.5-25-1 MG TABS Take 1 tablet by mouth daily 11/8/2023 at AM    HYDROcodone-acetaminophen (NORCO) 5-325 MG tablet Take 1 tablet by mouth every 6 hours as needed for severe pain     hydrOXYzine (VISTARIL) 25 MG capsule Take 1 capsule (25  mg) by mouth 3 times daily as needed for itching     IBANdronate (BONIVA) 150 MG tablet Take 150 mg by mouth every 30 days Unknown at NOT STARTED/PICKED UP    Lacosamide (VIMPAT) 100 MG TABS tablet Take 1 tablet (100 mg) by mouth 2 times daily 11/8/2023 at AM    lamoTRIgine (LAMICTAL) 200 MG tablet Take 1 tablet (200 mg) by mouth 2 times daily 11/8/2023 at AM    levothyroxine (SYNTHROID/LEVOTHROID) 50 MCG tablet Take 1 tablet (50 mcg) at 7 am daily, 1 hour prior to eating and taking other medications 11/8/2023 at AM    melatonin 3 MG tablet Take 1 tablet (3 mg) by mouth nightly as needed for sleep 11/7/2023 at HS    multivitamin, therapeutic (THERA-VIT) TABS tablet Take 1 tablet by mouth daily 11/8/2023 at AM    omeprazole (PRILOSEC OTC) 20 MG EC tablet Take 20 mg by mouth daily 11/8/2023 at AM    polyethylene glycol (MIRALAX) 17 GM/Dose powder Take 17 g (1 Capful) by mouth daily 11/8/2023 at USING BID    potassium chloride ER (KLOR-CON M) 20 MEQ CR tablet Take 1 tablet (20 mEq) by mouth daily 11/8/2023 at AM    sodium chloride (OCEAN) 0.65 % nasal spray Use 1 spray into each nostril daily 11/8/2023 at AM    thiamine (B-1) 100 MG tablet Take 1 tablet (100 mg) by mouth daily 11/8/2023 at AM    vitamin D3 (CHOLECALCIFEROL) 50 mcg (2000 units) tablet Take 1 tablet (50 mcg) by mouth daily 11/8/2023 at AM

## 2023-11-09 NOTE — PROGRESS NOTES
11/09/23 1578   Appointment Info   Signing Clinician's Name / Credentials (OT) Kayley Smith, OTR/L   Living Environment   People in Home alone   Current Living Arrangements house   Home Accessibility no concerns;stairs to enter home;stairs within home   Number of Stairs, Main Entrance 5   Stair Railings, Main Entrance railings safe and in good condition;railing on right side (ascending)   Number of Stairs, Within Home, Primary greater than 10 stairs   Stair Railings, Within Home, Primary railings safe and in good condition   Transportation Anticipated family or friend will provide   Self-Care   Usual Activity Tolerance good   Current Activity Tolerance fair   Equipment Currently Used at Home none   Fall history within last six months yes   Cognitive Status Examination   Orientation Status orientation to person, place and time   Pain Assessment   Patient Currently in Pain Yes, see Vital Sign flowsheet   Range of Motion Comprehensive   General Range of Motion bilateral upper extremity ROM WFL   Coordination   Upper Extremity Coordination No deficits were identified   Transfers   Transfers bed-chair transfer;shower transfer   Transfer Skill: Bed to Chair/Chair to Bed   Bed-Chair [place] (Transfers) contact guard   Assistive Device (Bed-Chair Transfers) rolling walker   Shower Transfer   [place] Level (Shower Transfer) contact guard   Assistive Device (Shower Transfer) walker, front-wheeled   Clinical Impression   Criteria for Skilled Therapeutic Interventions Met (OT) Evaluation only   OT Diagnosis pelvic fracture   Influenced by the following impairments pain and limited mobility   Clinical Decision Making Complexity (OT) problem focused assessment/low complexity   Risk & Benefits of therapy have been explained risks/benefits reviewed   OT Total Evaluation Time   OT Eval, Low Complexity Minutes (82661) 15   OT Goals   Therapy Frequency (OT) One time eval and treatment   Interventions   Interventions  Quick Adds Self-Care/Home Management;Therapeutic Activity   Self-Care/Home Management   Self-Care/Home Mgmt/ADL, Compensatory, Meal Prep Minutes (94760) 60   [place] Level (Grooming Training) stand-by assist   [place] Level (Bathing Training) stand-by assist   [place] Level (Upper Body Dressing Training) stand-by assist   Lower Body Dressing Training Assistance minimum assist (75% patient effort)   Lower Body Dressing Training Assistance 1 person assist   OT Discharge Planning   OT Plan d/c home today   OT Discharge Recommendation (DC Rec) home with assist   OT Rationale for DC Rec pt has declined any further OT, has assist from family as needed   OT Brief overview of current status Pt very pleasant and progressing very well, able to tolerate increased functional activity today, completed full shower with SBA overall seated and standing, min assist to ynes socks due to limited reach from pain, completed all other tasks with SBA. Pt has declined home care services, will follow up with primary MD. Pt interested in obtaining a shower chair and provided information to her.   OT Equipment Needed at Discharge shower chair   Total Session Time   Timed Code Treatment Minutes 60   Total Session Time (sum of timed and untimed services) 75

## 2023-11-09 NOTE — PROGRESS NOTES
"PRIMARY DIAGNOSIS: \"GENERIC\" NURSING  OUTPATIENT/OBSERVATION GOALS TO BE MET BEFORE DISCHARGE:  ADLs back to baseline: No    Activity and level of assistance: Up with maximum assistance. Consider SW and/or PT evaluation.     Pain status: No improvement noted. Consider adjustment in pain regimen.    Return to near baseline physical activity: No     Discharge Planner Nurse   Safe discharge environment identified:  Unsure at this time  Barriers to discharge: Yes       Entered by: Elida Michele RN 11/09/2023 3:53 AM     Please review provider order for any additional goals.   Nurse to notify provider when observation goals have been met and patient is ready for discharge.  "

## 2023-11-09 NOTE — PROGRESS NOTES
"[place] [facility] [place]    Patient admitted to room 315 at approximately 2000 via wheel chair from emergency room. Patient was accompanied by nurse.     Verbal SBAR report received from CHUCK Zepeda prior to patient arrival.     Patient ambulated to bed with stand-by assist. Patient alert and oriented X 3. Pain is not well controlled. Admission vital signs: Blood pressure 115/79, pulse 77, temperature 99.7  F (37.6  C), temperature source Tympanic, resp. rate 18, height 1.676 m (5' 6\"), weight 45.4 kg (100 lb), SpO2 98%, not currently breastfeeding. Patient was oriented to plan of care, call light, bed controls, tv, telephone, bathroom, and visiting hours.     Risk Assessment    The following safety risks were identified during admission: fall and seizure. Yellow risk band applied: YES.     Skin Initial Assessment    This writer admitted this patient and completed a full skin assessment and Jarvis score in the Adult PCS flowsheet. Appropriate interventions initiated as needed.       Education    Patient has a [place] to Observation order: Yes  Observation education completed and documented: Yes - per ED nurse      Elida Michele RN      "

## 2023-11-09 NOTE — ED PROVIDER NOTES
"      EMERGENCY DEPARTMENT ENCOUNTER      NAME: Sara Liriano  AGE: 48 year old female  YOB: 1975  MRN: [id number]  EVALUATION DATE & TIME: 11/8/2023 12:36 PM    PCP: Tammy Rhoades    ED PROVIDER: John Dillard PA-C       CHIEF COMPLAINT:  Chief Complaint   Patient presents with    Hip Pain    Groin Pain         ED COURSE, MEDICAL DECISION MAKING, FINAL IMPRESSION AND PLAN:      Assessment / Plan:  1. Closed nondisplaced fracture of pelvis, unspecified part of pelvis, initial encounter (H)    2. Contusion of right hip, initial encounter    3. Primary osteoarthritis of right hip    4. Shoulder injury, right, initial encounter    5. Arthritis of right glenohumeral joint    6. Bony sclerosis    7. Fall from slip, trip, or stumble, initial encounter    8. Seizure (H)    9. Inner ear dysfunction, left        The patient was interviewed and examined.  HPI and physical exam as below.  Differential diagnosis and MDM Key Documentation Elements as below.  Vitals, triage note, and nursing notes were reviewed./76   Pulse 85   Temp 98.2  F (36.8  C) (Oral)   Resp 16   Ht 1.676 m (5' 6\")   Wt 45.4 kg (100 lb)   SpO2 99%   BMI 16.14 kg/m      Differential includes but is not limited to hip contusion, hip fracture, pelvic fracture, hip osteoarthritis, shoulder contusion, rotator cuff injury, shoulder fracture, shoulder dislocation, shoulder arthritis    Patient with noted right hip tenderness on direct evaluation.  No signs of pelvic instability or lower limb length discrepancy.  No signs of neurovascular compromise.  X-rays of the right hip and right shoulder were negative for signs of acute fracture but did show signs of underlying osteoarthritis.  Attempts at ambulation here in the ER were unsuccessful.  Patient could barely stand up to pivot to the wheelchair.  CT of the pelvis was then ordered with patient having nondisplaced fractures of the superior and inferior pubic rami.  No signs " of femoral neck fracture or pelvic instability.  Discussed with patient staying in the hospital for pain control versus going home.  Patient feels unsafe going home due to pain.  We will make patient observation for pain control and PT/OT evaluation.  Patient may bear weight as tolerated on the right lower extremity.    Patient also request that she does have a history of seizure and inner ear dysfunction.  Patient did request referrals to ENT and neurology which were done here in the ER.  Patient was denying any seizure activity or any significant ear problems here today in the ER and was done out of courtesy to the patient.    Medications given during today's ER visit:  Medications   HYDROcodone-acetaminophen (NORCO) 5-325 MG per tablet 1 tablet (1 tablet Oral $Given 11/8/23 1533)   lamoTRIgine (LaMICtal) tablet 200 mg (200 mg Oral $Given 11/8/23 1905)   polyethylene glycol (MIRALAX) Packet 17 g (17 g Oral $Given 11/8/23 1905)       New prescriptions started at today's ER visit  Current Discharge Medication List        START taking these medications    Details   HYDROcodone-acetaminophen (NORCO) 5-325 MG tablet Take 1 tablet by mouth every 6 hours as needed for severe pain  Qty: 12 tablet, Refills: 0      hydrOXYzine (VISTARIL) 25 MG capsule Take 1 capsule (25 mg) by mouth 3 times daily as needed for itching  Qty: 15 capsule, Refills: 0             Pertinent Labs & Imaging studies reviewed. (See chart for details)  Results for orders placed or performed during the hospital encounter of 11/08/23   XR Shoulder Right G/E 3 Views    Impression    IMPRESSION: No acute fracture.      LALIT LINTON MD         SYSTEM ID:  [id number]   CT Pelvis Bone wo Contrast    Impression    IMPRESSION:  1.  Acute nondisplaced right superior and inferior pubic rami  fractures. Due to degree of osteopenia, follow-up MR pelvis may be  useful to rule out occult nondisplaced fractures.  2.  Prominent volume of fluid in the colon, could  "represent enteritis.    EBONIE RAGLAND MD         SYSTEM ID:  [id number]   Basic metabolic panel   Result Value Ref Range    Sodium 141 135 - 145 mmol/L    Potassium 4.3 3.4 - 5.3 mmol/L    Chloride 102 98 - 107 mmol/L    Carbon Dioxide (CO2) 31 (H) 22 - 29 mmol/L    Anion Gap 8 7 - 15 mmol/L    Urea Nitrogen 9.8 6.0 - 20.0 mg/dL    Creatinine 0.61 0.51 - 0.95 mg/dL    GFR Estimate >90 >60 mL/min/1.73m2    Calcium 9.1 8.6 - 10.0 mg/dL    Glucose 93 70 - 99 mg/dL   CBC with platelets and differential   Result Value Ref Range    WBC Count 12.7 (H) 4.0 - 11.0 10e3/uL    RBC Count 4.02 3.80 - 5.20 10e6/uL    Hemoglobin 13.5 11.7 - 15.7 g/dL    Hematocrit 40.2 35.0 - 47.0 %     78 - 100 fL    MCH 33.6 (H) 26.5 - 33.0 pg    MCHC 33.6 31.5 - 36.5 g/dL    RDW 12.4 10.0 - 15.0 %    Platelet Count 296 150 - 450 10e3/uL    % Neutrophils 72 %    % Lymphocytes 18 %    % Monocytes 9 %    % Eosinophils 0 %    % Basophils 1 %    % Immature Granulocytes 0 %    NRBCs per 100 WBC 0 <1 /100    Absolute Neutrophils 9.1 (H) 1.6 - 8.3 10e3/uL    Absolute Lymphocytes 2.3 0.8 - 5.3 10e3/uL    Absolute Monocytes 1.2 0.0 - 1.3 10e3/uL    Absolute Eosinophils 0.0 0.0 - 0.7 10e3/uL    Absolute Basophils 0.1 0.0 - 0.2 10e3/uL    Absolute Immature Granulocytes 0.0 <=0.4 10e3/uL    Absolute NRBCs 0.0 10e3/uL     No results found for: \"ABORH\"      Reassessments, Medications, Interventions, & Response to Treatments:  Pain improved with oral Norco.  Patient unable to stand due to pain.  Given patient unable to ambulate safety, will admit patient to observation for pain control further work-up.  Discussed laboratory and imaging results.  Discussed treatment plan of follow-up    Consultations:  Dr. Valerio[facility]  Dr. Mendez-hospitalist service    Decision Rules, Medical Calculators, and Risk Stratification Tools:  None    MDM Key Documentation Elements for Patient's Evaluation:  Differential diagnosis to include high risk " considerations: As above  Escalation to admission/observation considered: Admission/observation considered, patient was admitted in stable condition  Discussions and management with other clinicians:    3a. Independent interpretation of testing performed by another health professional:  -No  3b. Discussion of management or test interpretation with another health professional: -Yes  Independent interpretation of tests:  Ordering and/or review of 3+ test(s)  Discussion of test interpretations with radiology:  No  History obtained from source other than patient or assessment requiring an independent historian:  No  Review of non-ED/external records:  review of 3+ records  Diagnostic tests considered but not ultimately performed/deferred:  -MR hip/pelvis  Prescription medications considered but not prescribed:  - Patient was admitted  Chronic conditions affecting care:  -Osteopenia  Care affected by social determinants of health:  -None    The patient's management involved:   - Laboratory studies  - Imaging studies  - Parenteral controlled substance      A shared decision making model was used. Time was taken to answer all questions.  Patient and/or associated parties understood and were agreeable to treatment plan.  Plan and all results were discussed.  Patient was admitted in stable condition      PPE worn during patient evaluation:  Mask: Yes  Eye Protection: No  Gown: No  Hair cover: No  Face Shield: No  Patient wearing a mask: No      MEDICATIONS GIVEN IN THE EMERGENCY:  Medications   HYDROcodone-acetaminophen (NORCO) 5-325 MG per tablet 1 tablet (1 tablet Oral $Given 11/8/23 1533)   lamoTRIgine (LaMICtal) tablet 200 mg (200 mg Oral $Given 11/8/23 1905)   polyethylene glycol (MIRALAX) Packet 17 g (17 g Oral $Given 11/8/23 1905)       NEW PRESCRIPTIONS STARTED AT TODAY'S ER VISIT:  Current Discharge Medication List        START taking these medications    Details   HYDROcodone-acetaminophen (NORCO) 5-325 MG tablet  Take 1 tablet by mouth every 6 hours as needed for severe pain  Qty: 12 tablet, Refills: 0      hydrOXYzine (VISTARIL) 25 MG capsule Take 1 capsule (25 mg) by mouth 3 times daily as needed for itching  Qty: 15 capsule, Refills: 0                =================================================================    HPI  Sara Liriano is a pleasant 48 year old female with history of anorexia, osteopenia, TBI and PTSD, 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  Patient was working out [employer] when she tripped over a bench landing on her right side mainly onto her right pelvis.  Patient complaining of moderate severe pain on her right lateral hip.  Patient was able to stand but with great pain.  Also having some mild discomfort to her right shoulder although patient is moderately concerned with her right hip.  No head or neck injury.  No loss of conscious.  No chest pain, back pain, abdominal pain.  No use of blood thinners.      REVIEW OF SYSTEMS   Review of Systems  As above, otherwise ROS is unremarkable.      PAST MEDICAL HISTORY:  Past Medical History:   Diagnosis Date    Anorexia     Atypical depression     Hypothyroidism     Intracranial hemorrhage (H) 2018    Osteopenia     PTSD (post-traumatic stress disorder)     Seizure disorder (H)     TBI (traumatic brain injury) (H) 05/2018       PAST SURGICAL HISTORY:  Past Surgical History:   Procedure Laterality Date    CRANIOPLASTY  08/08/2018    CRANIOTOMY  05/2018    TRACHEOSTOMY  06/2018           CURRENT MEDICATIONS:    Current Outpatient Medications   Medication Instructions    calcium carbonate (OS-MOISES) 500 mg, Oral, DAILY    cyanocobalamin (VITAMIN B-12) 1,000 mcg, Oral, DAILY    docusate sodium (COLACE) 100 mg, Oral, 2 TIMES DAILY PRN    fluticasone (FLONASE) 50 MCG/ACT nasal spray 1 spray, Both Nostrils, DAILY    Folic Acid-Vit B6-Vit B12 (FOLBEE) 2.5-25-1 MG TABS 1  "tablet, Oral, DAILY    HYDROcodone-acetaminophen (NORCO) 5-325 MG tablet 1 tablet, Oral, EVERY 6 HOURS PRN    hydrOXYzine (VISTARIL) 25 mg, Oral, 3 TIMES DAILY PRN    IBANdronate (BONIVA) 150 mg, Oral, EVERY 30 DAYS    Lacosamide (VIMPAT) 100 mg, Oral, 2 TIMES DAILY    lamoTRIgine (LAMICTAL) 200 mg, Oral, 2 TIMES DAILY    levothyroxine (SYNTHROID/LEVOTHROID) 50 MCG tablet Take 1 tablet (50 mcg) at 7 am daily, 1 hour prior to eating and taking other medications    melatonin 3 mg, Oral, AT BEDTIME PRN    Nutritional Supplements (ENSURE HIGH PROTEIN) Take  by mouth.    OLANZapine (ZYPREXA) 5 MG tablet     polyethylene glycol (MIRALAX) 17 GM/Dose powder 1 Capful, Oral, DAILY    potassium chloride ER (KLOR-CON M) 20 MEQ CR tablet 20 mEq, Oral, DAILY    sodium chloride (OCEAN) 0.65 % nasal spray Use 1 spray into each nostril daily    thiamine (B-1) 100 mg, Oral, DAILY    vitamin D3 (CHOLECALCIFEROL) 50 mcg, Oral, DAILY    zoledronic Acid (RECLAST) 5 mg, Intravenous       ALLERGIES:  Allergies   Allergen Reactions    Fluoxetine Other (See Comments) and Swelling     Blurred vision, heart racing, face swelling    Amoxicillin Other (See Comments)     \"Thrush\"       FAMILY HISTORY:  Family History   Problem Relation Age of Onset    Heart Failure Father     Diabetes Father     Hypertension Sister        SOCIAL HISTORY:   Social History     Socioeconomic History    Marital status: Single   Tobacco Use    Smoking status: Never     Passive exposure: Never    Smokeless tobacco: Never   Vaping Use    Vaping Use: Never used   Substance and Sexual Activity    Alcohol use: Yes     Comment: Alcoholic Drinks/day: Occassional    Drug use: No     Comment: Drug use: No    Sexual activity: Not Currently     Social Determinants of Health     Financial Resource Strain: Low Risk  (10/23/2023)    Financial Resource Strain     Within the past 12 months, have you or your family members you live with been unable to get utilities (heat, electricity) " "when it was really needed?: No   Food Insecurity: Low Risk  (10/23/2023)    Food Insecurity     Within the past 12 months, did you worry that your food would run out before you got money to buy more?: No     Within the past 12 months, did the food you bought just not last and you didn t have money to get more?: No   Transportation Needs: High Risk (10/23/2023)    Transportation Needs     Within the past 12 months, has lack of transportation kept you from medical appointments, getting your medicines, non-medical meetings or appointments, work, or from getting things that you need?: Yes   Interpersonal Safety: Low Risk  (10/23/2023)    Interpersonal Safety     Do you feel physically and emotionally safe where you currently live?: Yes     Within the past 12 months, have you been hit, slapped, kicked or otherwise physically hurt by someone?: No     Within the past 12 months, have you been humiliated or emotionally abused in other ways by your partner or ex-partner?: No   Housing Stability: Low Risk  (10/23/2023)    Housing Stability     Do you have housing? : Yes     Are you worried about losing your housing?: No       PHYSICAL EXAM    VITAL SIGNS: /76   Pulse 85   Temp 98.2  F (36.8  C) (Oral)   Resp 16   Ht 1.676 m (5' 6\")   Wt 45.4 kg (100 lb)   SpO2 99%   BMI 16.14 kg/m      Patient Vitals for the past 24 hrs:   BP Temp Temp src Pulse Resp SpO2 Height Weight   11/08/23 1943 102/76 -- -- 85 -- -- -- --   11/08/23 1244 95/72 98.2  F (36.8  C) Oral 80 16 99 % 1.676 m (5' 6\") 45.4 kg (100 lb)       Physical Exam  Vitals and nursing note reviewed.   Constitutional:       General: She is not in acute distress.     Appearance: Normal appearance. She is not ill-appearing or diaphoretic.      Comments: In discomfort from pain   HENT:      Head: Normocephalic and atraumatic.      Right Ear: Tympanic membrane, ear canal and external ear normal.      Left Ear: Tympanic membrane, ear canal and external ear normal. "      Nose: Nose normal.      Mouth/Throat:      Mouth: Mucous membranes are moist.   Eyes:      Extraocular Movements: Extraocular movements intact.      Conjunctiva/sclera: Conjunctivae normal.      Pupils: Pupils are equal, round, and reactive to light.   Cardiovascular:      Rate and Rhythm: Normal rate and regular rhythm.      Pulses: Normal pulses.      Heart sounds: Normal heart sounds. No murmur heard.     No friction rub. No gallop.   Pulmonary:      Effort: Pulmonary effort is normal.      Breath sounds: Normal breath sounds. No wheezing, rhonchi or rales.   Chest:      Chest wall: No tenderness.   Abdominal:      General: Abdomen is flat. Bowel sounds are normal.      Palpations: Abdomen is soft.      Tenderness: There is no abdominal tenderness.   Musculoskeletal:      Cervical back: Normal range of motion and neck supple. No tenderness.      Comments: Pelvis was stable.  Patient with pelvic tenderness on the right side.  No lower limb length discrepancy.  Patient with full active range of motion of the right hip.  No midline C-spine, T-spine, or L-spine tenderness.  No tenderness to the right knee or right ankle.  No tenderness to left lower extremity.  Patient was neurovascular intact in lower extremities without deficits.   Skin:     General: Skin is warm and dry.      Capillary Refill: Capillary refill takes less than 2 seconds.   Neurological:      General: No focal deficit present.      Mental Status: She is alert and oriented to person, place, and time.   Psychiatric:         Mood and Affect: Mood normal.          LABS & RADIOLOGY:  All pertinent labs reviewed and interpreted. Reviewed all pertinent imaging. Please see official radiology report.  Results for orders placed or performed during the hospital encounter of 11/08/23   XR Shoulder Right G/E 3 Views    Impression    IMPRESSION: No acute fracture.      LALIT LINTON MD         SYSTEM ID:  [id number]   CT Pelvis Bone wo Contrast     Impression    IMPRESSION:  1.  Acute nondisplaced right superior and inferior pubic rami  fractures. Due to degree of osteopenia, follow-up MR pelvis may be  useful to rule out occult nondisplaced fractures.  2.  Prominent volume of fluid in the colon, could represent enteritis.    EBONIE RAGLAND MD         SYSTEM ID:  [id number]   Basic metabolic panel   Result Value Ref Range    Sodium 141 135 - 145 mmol/L    Potassium 4.3 3.4 - 5.3 mmol/L    Chloride 102 98 - 107 mmol/L    Carbon Dioxide (CO2) 31 (H) 22 - 29 mmol/L    Anion Gap 8 7 - 15 mmol/L    Urea Nitrogen 9.8 6.0 - 20.0 mg/dL    Creatinine 0.61 0.51 - 0.95 mg/dL    GFR Estimate >90 >60 mL/min/1.73m2    Calcium 9.1 8.6 - 10.0 mg/dL    Glucose 93 70 - 99 mg/dL   CBC with platelets and differential   Result Value Ref Range    WBC Count 12.7 (H) 4.0 - 11.0 10e3/uL    RBC Count 4.02 3.80 - 5.20 10e6/uL    Hemoglobin 13.5 11.7 - 15.7 g/dL    Hematocrit 40.2 35.0 - 47.0 %     78 - 100 fL    MCH 33.6 (H) 26.5 - 33.0 pg    MCHC 33.6 31.5 - 36.5 g/dL    RDW 12.4 10.0 - 15.0 %    Platelet Count 296 150 - 450 10e3/uL    % Neutrophils 72 %    % Lymphocytes 18 %    % Monocytes 9 %    % Eosinophils 0 %    % Basophils 1 %    % Immature Granulocytes 0 %    NRBCs per 100 WBC 0 <1 /100    Absolute Neutrophils 9.1 (H) 1.6 - 8.3 10e3/uL    Absolute Lymphocytes 2.3 0.8 - 5.3 10e3/uL    Absolute Monocytes 1.2 0.0 - 1.3 10e3/uL    Absolute Eosinophils 0.0 0.0 - 0.7 10e3/uL    Absolute Basophils 0.1 0.0 - 0.2 10e3/uL    Absolute Immature Granulocytes 0.0 <=0.4 10e3/uL    Absolute NRBCs 0.0 10e3/uL     CT Pelvis Bone wo Contrast   Final Result   IMPRESSION:   1.  Acute nondisplaced right superior and inferior pubic rami   fractures. Due to degree of osteopenia, follow-up MR pelvis may be   useful to rule out occult nondisplaced fractures.   2.  Prominent volume of fluid in the colon, could represent enteritis.      EBONIE RAGLAND MD            SYSTEM ID:  [id number]      XR  Shoulder Right G/E 3 Views   Final Result   IMPRESSION: No acute fracture.        LALIT LINTON MD            SYSTEM ID:  [id number]      XR Pelvis and Hip Right 2 Views   Final Result              I, Hernan Dillard PA-C, personally performed the services described in this documentation, and it is both accurate and complete.       Lalit Dillard PA-C  11/08/23 2004

## 2023-11-09 NOTE — PLAN OF CARE
"[facility] DISCHARGE NOTE    Patient discharged to home at 2:27 PM via wheel chair. Accompanied by mother and staff. Discharge instructions reviewed with patient, opportunity offered to ask questions. Prescriptions sent to patients preferred pharmacy. All belongings sent with patient.    Kym Jeong RN  Problem: Adult Inpatient Plan of Care  Goal: Plan of Care Review  Description: The Plan of Care Review/Shift note should be completed every shift.  The Outcome Evaluation is a brief statement about your assessment that the patient is improving, declining, or no change.  This information will be displayed automatically on your shift  note.  Outcome: Met  Goal: Patient-Specific Goal (Individualized)  Description: You can add care plan individualizations to a care plan. Examples of Individualization might be:  \"Parent requests to be called daily at 9am for status\", \"I have a hard time hearing out of my right ear\", or \"Do not touch me to wake me up as it startles  me\".  Outcome: Met  Goal: Absence of Hospital-Acquired Illness or Injury  Outcome: Met  Intervention: Identify and Manage Fall Risk  Recent Flowsheet Documentation  Taken 11/9/2023 0820 by Kym Jeong RN  Safety Promotion/Fall Prevention:   activity supervised   assistive device/personal items within reach   clutter free environment maintained   nonskid shoes/slippers when out of bed   patient and family education   room door open   room organization consistent   safety round/check completed   treat reversible contributory factors   treat underlying cause  Intervention: Prevent Skin Injury  Recent Flowsheet Documentation  Taken 11/9/2023 0820 by Kym Jeong, RN  Body Position:   position changed independently   weight shifting   supine, head elevated  Intervention: Prevent and Manage VTE (Venous Thromboembolism) Risk  Recent Flowsheet Documentation  Taken 11/9/2023 0820 by Kym Jeong RN  VTE Prevention/Management: SCDs (sequential compression " devices) on  Intervention: Prevent Infection  Recent Flowsheet Documentation  Taken 11/9/2023 0820 by Kym Jeong, RN  Infection Prevention:   hand hygiene promoted   rest/sleep promoted   single patient room provided  Goal: Optimal Comfort and Wellbeing  Outcome: Met  Intervention: Monitor Pain and Promote Comfort  Recent Flowsheet Documentation  Taken 11/9/2023 0820 by Kym Jeong, RN  Pain Management Interventions:   medication (see MAR)   care clustered   emotional support   heat applied   repositioned   shower   quiet environment facilitated   medication offered but refused  Goal: Readiness for Transition of Care  Outcome: Met   Goal Outcome Evaluation:

## 2023-11-09 NOTE — PROGRESS NOTES
"PRIMARY DIAGNOSIS: \"GENERIC\" NURSING  OUTPATIENT/OBSERVATION GOALS TO BE MET BEFORE DISCHARGE:  ADLs back to baseline: No    Activity and level of assistance: Up with maximum assistance. Consider SW and/or PT evaluation.     Pain status: No improvement noted. Consider adjustment in pain regimen.    Return to near baseline physical activity: No     Discharge Planner Nurse   Safe discharge environment identified:  Unsure at this time  Barriers to discharge: Yes       Entered by: Elida Michele RN 11/09/2023 12:49 AM     Please review provider order for any additional goals.   Nurse to notify provider when observation goals have been met and patient is ready for discharge.  "

## 2023-11-09 NOTE — PLAN OF CARE
Patient admitted for pelvic rami fractures. Patient's pain was tolerable upon admit then got significantly worse over the next couple hours. Patient is not able to put much weight on RLE. Patient reports minimal pain when not moving, but between 8-10/10 when moving. Patient was supplied an air mattress for comfort. Patient has history of TBI and seizures, side rails are padded. Patient is A&O, reports her BP generally runs softer.       Problem: Adult Inpatient Plan of Care  Goal: Optimal Comfort and Wellbeing  Outcome: Not Progressing  Intervention: Monitor Pain and Promote Comfort  Recent Flowsheet Documentation  Taken 11/9/2023 0446 by Elida Michele RN  Pain Management Interventions:   medication (see MAR)   care clustered   emotional support   heat applied   repositioned  Taken 11/9/2023 0044 by Elida Michele RN  Pain Management Interventions:   medication (see MAR)   care clustered   emotional support   heat applied   repositioned  Goal: Readiness for Transition of Care  Outcome: Not Progressing     Problem: Pain Acute  Goal: Optimal Pain Control and Function  Outcome: Not Progressing  Intervention: Develop Pain Management Plan  Recent Flowsheet Documentation  Taken 11/9/2023 0446 by Elida Michele RN  Pain Management Interventions:   medication (see MAR)   care clustered   emotional support   heat applied   repositioned  Taken 11/9/2023 0044 by Elida Michele RN  Pain Management Interventions:   medication (see MAR)   care clustered   emotional support   heat applied   repositioned  Intervention: Prevent or Manage Pain  Recent Flowsheet Documentation  Taken 11/9/2023 0421 by Elida Michele RN  Medication Review/Management:   medications reviewed   high-risk medications identified  Taken 11/9/2023 0049 by Elida Michele RN  Medication Review/Management:   medications reviewed   high-risk medications identified  Taken 11/8/2023 2008 by Elida Michele, RN  Medication  Review/Management:   medications reviewed   high-risk medications identified   Goal Outcome Evaluation:

## 2023-11-09 NOTE — PROGRESS NOTES
"[facility]    Medicine Progress Note - Hospitalist Service    Date of Admission:  11/8/2023    Assessment & Plan   Principal Problem:    Closed nondisplaced fracture of pelvis, unspecified part of pelvis, initial encounter (H)    Assessment: right superior and inferior pubic rami fractures, acute. Pain controlled today. Up with ambulation.     Plan: pain control  Physical and occupational therapy  Home today or tomorrow    Active Problems:    Seizure disorder (H)    Assessment: chronic    Plan: continue lacosamide and lamotrigine      Contusion of right hip, initial encounter        Fall from slip, trip, or stumble, initial encounter      Shoulder injury, right, initial encounter    Assessment: no fracture       Hypothyroidism    Assessment: present on admission     Plan: levothyroxine      PTSD    Assessment: chonic    Plan: continue olanzapine          Diet: Combination Diet Regular Diet Adult    DVT Prophylaxis: Low Risk/Ambulatory with no VTE prophylaxis indicated  Saleh Catheter: Not present  Lines: None     Cardiac Monitoring: None  Code Status: Full Code      Clinically Significant Risk Factors Present on Admission                       # Cachexia: Estimated body mass index is 15.75 kg/m  as calculated from the following:    Height as of this encounter: 1.676 m (5' 6\").    Weight as of this encounter: 44.3 kg (97 lb 9.6 oz).              Disposition Plan      Expected Discharge Date: 11/09/2023                    Jayjay Harper MD  Hospitalist Service  [facility]  Securely message with [employer] (more info)  Text page via [facility] Paging/Directory   ______________________________________________________________________    Interval History   Pain controlled. This was a stumble, no loss of consciousness or seizure     Physical Exam   Vital Signs: Temp: 96.9  F (36.1  C) Temp src: Tympanic BP: 97/63 Pulse: 69   Resp: 14 SpO2: 98 % O2 Device: None (Room air)    Weight: 97 " lbs 9.6 oz    GENERAL: Comfortable, no apparent distress.  CARDIOVASCULAR: regular rate and rhythm, no murmur. No lower extremity edema   RESPIRATORY: Clear to auscultation bilaterally, no wheezes or crackles.  GI: non-tender, non-distended, normal bowel sounds.   SKIN: warm periphery, no rashes      Medical Decision Making       55 MINUTES SPENT BY ME on the date of service doing chart review, history, exam, documentation & further activities per the note.      Data     I have personally reviewed the following data over the past 24 hrs:    7.9  \   12.4   / 255     138 106 10.5 /  82   5.1 27 0.68 \     INR:  0.99 PTT:  N/A   D-dimer:  N/A Fibrinogen:  N/A       Imaging results reviewed over the past 24 hrs:   Recent Results (from the past 24 hour(s))   XR Pelvis and Hip Right 2 Views    Narrative    XR PELVIS AND HIP RIGHT 2 VIEWS    HISTORY: FALL .    COMPARISON: None.    TECHNIQUE: AP pelvis and 2 views right hip.    FINDINGS:    3 sclerotic foci in the right iliac bone are nonspecific. Correlation  with remote priors would be reassuring.    Osteopenia. No evidence of acute fracture.    Mild right hip osteoarthritis. Bilateral SI joint osteophytosis.      LALIT LINTON MD         SYSTEM ID:  [id number]   XR Shoulder Right G/E 3 Views    Narrative    PROCEDURE:  XR SHOULDER RIGHT G/E 3 VIEWS    HISTORY: fall.    COMPARISON:  None.    TECHNIQUE:  3 views right shoulder.    FINDINGS:  No fracture or dislocation is identified. Mild right  glenohumeral osteoarthritis. Mild AC joint hypertrophy. No foreign  body is seen.       Impression    IMPRESSION: No acute fracture.      LALIT LINTON MD         SYSTEM ID:  [id number]   CT Pelvis Bone wo Contrast    Narrative    EXAM: CT PELVIS BONE WO CONTRAST, 11/8/2023 4:40 PM    HISTORY: right hip pain after fall, difficulty with standing and  pivoting.  r/o fx    COMPARISON: Radiograph 11/8/2023    PROCEDURE:   Imaging protocol: Axial CT images with multiplanar  reformats were  obtained of the pelvis without contrast.  Acquisition: This CT exam was performed using one or more the  following dose reduction techniques: automated exposure control,  adjustment of the mA and/or kV according to patient size, and/or  iterative reconstruction technique.    FINDINGS:    BONES AND SOFT TISSUE:  Osteopenia, mildly limits exam. Nondisplaced right superior pubic  ramus fracture. Focal cortical abnormality along the lateral aspect of  the right inferior pubic ramus, suggestive of nondisplaced fracture.  Joints are appropriately aligned. Joint spaces are preserved. Three  sclerotic foci in the right iliac bone, largest measures up to 1.6 cm.    ABDOMEN AND PELVIS:  Fluid and air distended colon without dilatation. No small bowel  dilatation. No free fluid or free air. No aortic aneurysmal  dilatation. No pathologically enlarged lymph nodes.      Impression    IMPRESSION:  1.  Acute nondisplaced right superior and inferior pubic rami  fractures. Due to degree of osteopenia, follow-up MR pelvis may be  useful to rule out occult nondisplaced fractures.  2.  Prominent volume of fluid in the colon, could represent enteritis.    EBONIE RAGLAND MD         SYSTEM ID:  [id number]

## 2023-11-10 ENCOUNTER — PATIENT OUTREACH (OUTPATIENT)
Dept: FAMILY MEDICINE | Facility: OTHER | Age: 48
End: 2023-11-10
Payer: OTHER GOVERNMENT

## 2023-11-10 NOTE — TELEPHONE ENCOUNTER
[facility] Transitional Care Management phone call attempted at 8:35 AM 11/10/2023.      EXT: [id number]    Haley Scales RN on 11/10/2023 at 8:35 AM

## 2023-11-10 NOTE — TELEPHONE ENCOUNTER
Transitional Care Management Phone Call      Summary of hospitalization:  [facility] discharge summary reviewed    DISCHARGE DIAGNOSIS: Closed nondisplaced fracture of pelvis, unspecified part of pelvis, initial encounter (H)     DATE OF DISCHARGE: 11/09/2023    Diagnostic Tests/Treatments reviewed.  Follow up needed: none    Post Discharge Medication Reconciliation: discharge medications reconciled and changed, per note/orders      Medications reviewed by: by myself    Problems taking medications regularly:  None    Problems adhering to non-medication therapy:  None    Other Healthcare Providers Involved in Patient's Care:         None    Update since discharge: stable.     Plan of care communicated with: patient    Just a friendly reminder that you appointment is:  Next 5 appointments (look out 90 days)      Nov 20, 2023  1:40 PM  Office Visit with Sujit Bullock MD  [facility] [address]  [place] [id number] [phone]              We encourage you to keep this appointment.    Please remember to bring all of your pills in their bottles (including any vitamins or over the counter pills) with you to your appointment.     The patient indicates understanding of these issues and agrees with the plan of care.   Yes    Was the patient contacted within the 2 business days or other approved timeframe?  Yes    Was the Medication reconciliation and management done since the patient was discharged? Yes      Haley Scales RN  11/10/2023 9:34 AM

## 2023-11-17 NOTE — DISCHARGE SUMMARY
"[facility]  Hospitalist Discharge Summary      Date of Admission:  11/8/2023  Date of Discharge:  11/9/2023  2:48 PM  Discharging Provider: Jayjay Harper MD  Discharge Service: Hospitalist Service    Discharge Diagnoses   Principal Problem:    Closed nondisplaced fracture of pelvis, unspecified part of pelvis, initial encounter (H)  Active Problems:    Seizure disorder (H)    Contusion of right hip, initial encounter    Fall from slip, trip, or stumble, initial encounter    Shoulder injury, right, initial encounter       Clinically Significant Risk Factors     # Cachexia: Estimated body mass index is 15.75 kg/m  as calculated from the following:    Height as of this encounter: 1.676 m (5' 6\").    Weight as of this encounter: 44.3 kg (97 lb 9.6 oz).       Follow-ups Needed After Discharge   Follow-up Appointments     Follow-up and recommended labs and tests       Follow up with Dr. Bullock on 11/20 at 140 PM            Discharge Disposition   Discharged to home  Condition at discharge: Stable    Hospital Course      Closed nondisplaced fracture of pelvis, unspecified part of pelvis, initial encounter (H)    Assessment: right superior and inferior pubic rami fractures, acute. Pain controlled today. Up with ambulation. Wished to discharge home.     Active Problems:    Seizure disorder (H)    Assessment: chronic    Plan: continue lacosamide and lamotrigine       Contusion of right hip, initial encounter        Fall from slip, trip, or stumble, initial encounter       Shoulder injury, right, initial encounter    Assessment: no fracture        Hypothyroidism    Assessment: present on admission     Plan: levothyroxine       PTSD    Assessment: chonic    Plan: continue olanzapine    Consultations This Hospital Stay   PHYSICAL THERAPY ADULT IP CONSULT  OCCUPATIONAL THERAPY ADULT IP CONSULT    Code Status   Prior    Time Spent on this Encounter   I, Jayjay Harper MD, personally saw the patient " today and spent greater than 30 minutes discharging this patient.       Jayjay Harper MD  [facility]  [address]  [place] [address]  Phone: [phone]  Fax: [phone]  ______________________________________________________________________    Physical Exam   Vital Signs:                    Weight: 97 lbs 9.6 oz  GENERAL: Comfortable, no apparent distress.  CARDIOVASCULAR: regular rate and rhythm, no murmur. No lower extremity edema   RESPIRATORY: Clear to auscultation bilaterally, no wheezes or crackles.  GI: non-tender, non-distended, normal bowel sounds.   SKIN: warm periphery, no rashes         Primary Care Physician   Tammy Rhoades    Discharge Orders      Orthopedic  Referral      Adult ENT  Referral      Adult Neurology  Referral      Reason for your hospital stay    Pelvis fracture of the pubic ramus     Follow-up and recommended labs and tests     Follow up with Dr. Bullock on 11/20 at 140 PM     Activity    Your activity upon discharge: activity as tolerated     Diet    Follow this diet upon discharge: Orders Placed This Encounter      Combination Diet Regular Diet Adult       Significant Results and Procedures   Most Recent 3 CBC's:  Recent Labs   Lab Test 11/09/23  0600 11/08/23  1911 10/18/23  1259   WBC 7.9 12.7* 5.7   HGB 12.4 13.5 13.5   * 100 101*    296 318     Most Recent 3 BMP's:  Recent Labs   Lab Test 11/09/23  0600 11/08/23  2257 11/08/23  1911 10/18/23  1259     --  141 140   POTASSIUM 5.1  --  4.3 3.8   CHLORIDE 106  --  102 100   CO2 27  --  31* 32*   BUN 10.5  --  9.8 11.9   CR 0.68  --  0.61 0.65   ANIONGAP 5*  --  8 8   MOISES 8.5*  --  9.1 9.1   GLC 82 75 93 103*     Most Recent 2 LFT's:  Recent Labs   Lab Test 10/18/23  1259 05/16/18  0005   AST 40 71*   ALT 47 52   ALKPHOS 71 63   BILITOTAL 0.2 0.5   ,   Results for orders placed or performed during the hospital encounter of 11/08/23   XR Pelvis  and Hip Right 2 Views    Narrative    XR PELVIS AND HIP RIGHT 2 VIEWS    HISTORY: FALL .    COMPARISON: None.    TECHNIQUE: AP pelvis and 2 views right hip.    FINDINGS:    3 sclerotic foci in the right iliac bone are nonspecific. Correlation  with remote priors would be reassuring.    Osteopenia. No evidence of acute fracture.    Mild right hip osteoarthritis. Bilateral SI joint osteophytosis.      LALIT LINTON MD         SYSTEM ID:  [id number]   XR Shoulder Right G/E 3 Views    Narrative    PROCEDURE:  XR SHOULDER RIGHT G/E 3 VIEWS    HISTORY: fall.    COMPARISON:  None.    TECHNIQUE:  3 views right shoulder.    FINDINGS:  No fracture or dislocation is identified. Mild right  glenohumeral osteoarthritis. Mild AC joint hypertrophy. No foreign  body is seen.       Impression    IMPRESSION: No acute fracture.      LALIT LINTON MD         SYSTEM ID:  [id number]   CT Pelvis Bone wo Contrast    Narrative    EXAM: CT PELVIS BONE WO CONTRAST, 11/8/2023 4:40 PM    HISTORY: right hip pain after fall, difficulty with standing and  pivoting.  r/o fx    COMPARISON: Radiograph 11/8/2023    PROCEDURE:   Imaging protocol: Axial CT images with multiplanar reformats were  obtained of the pelvis without contrast.  Acquisition: This CT exam was performed using one or more the  following dose reduction techniques: automated exposure control,  adjustment of the mA and/or kV according to patient size, and/or  iterative reconstruction technique.    FINDINGS:    BONES AND SOFT TISSUE:  Osteopenia, mildly limits exam. Nondisplaced right superior pubic  ramus fracture. Focal cortical abnormality along the lateral aspect of  the right inferior pubic ramus, suggestive of nondisplaced fracture.  Joints are appropriately aligned. Joint spaces are preserved. Three  sclerotic foci in the right iliac bone, largest measures up to 1.6 cm.    ABDOMEN AND PELVIS:  Fluid and air distended colon without dilatation. No small bowel  dilatation.  No free fluid or free air. No aortic aneurysmal  dilatation. No pathologically enlarged lymph nodes.      Impression    IMPRESSION:  1.  Acute nondisplaced right superior and inferior pubic rami  fractures. Due to degree of osteopenia, follow-up MR pelvis may be  useful to rule out occult nondisplaced fractures.  2.  Prominent volume of fluid in the colon, could represent enteritis.    EBONIE RAGLAND MD         SYSTEM ID:  [id number]       Discharge Medications   Discharge Medication List as of 11/9/2023  2:26 PM        START taking these medications    Details   acetaminophen (TYLENOL) 500 MG tablet Take 2 tablets (1,000 mg) by mouth every 6 hours as needed for mild pain or other (and adjunct with moderate or severe pain or per patient request), OTC      hydrOXYzine (VISTARIL) 25 MG capsule Take 1 capsule (25 mg) by mouth 3 times daily as needed for itching, Disp-15 capsule, R-0, InstyMeds      magnesium hydroxide (MILK OF MAGNESIA) 400 MG/5ML suspension Take 30 mLs by mouth daily as needed for constipation, OTC      oxyCODONE (ROXICODONE) 5 MG tablet Take 1 tablet (5 mg) by mouth every 4 hours as needed for severe pain (IF pain not managed with non-pharmacological and non-opioid interventions), Disp-20 tablet, R-0, E-Prescribe      ibuprofen (ADVIL/MOTRIN) 400 MG tablet Take 1 tablet (400 mg) by mouth every 6 hours as needed for inflammatory pain, OTC           CONTINUE these medications which have NOT CHANGED    Details   calcium carbonate (OS-MOISES) 500 MG tablet Take 1 tablet (500 mg) by mouth daily, Disp-180 tablet, R-0, E-Prescribe      cyanocobalamin (VITAMIN B-12) 1000 MCG tablet Take 1 tablet (1,000 mcg) by mouth daily, Disp-90 tablet, R-3, E-Prescribe      docusate sodium (COLACE) 100 MG capsule Take 100 mg by mouth 2 times daily, Historical      fluticasone (FLONASE) 50 MCG/ACT nasal spray Spray 1 spray into both nostrils daily, Disp-11.1 mL, R-0, E-Prescribe      Folic Acid-Vit B6-Vit B12 (FOLBEE) 2.5-25-1  "MG TABS Take 1 tablet by mouth daily, Historical      IBANdronate (BONIVA) 150 MG tablet Take 150 mg by mouth every 30 days, Historical      Lacosamide (VIMPAT) 100 MG TABS tablet Take 1 tablet (100 mg) by mouth 2 times daily, Disp-180 tablet, R-3, E-Prescribe      lamoTRIgine (LAMICTAL) 200 MG tablet Take 1 tablet (200 mg) by mouth 2 times daily, Disp-180 tablet, R-3, E-Prescribe      levothyroxine (SYNTHROID/LEVOTHROID) 50 MCG tablet Take 1 tablet (50 mcg) at 7 am daily, 1 hour prior to eating and taking other medications, Disp-90 tablet, R-3, E-Prescribe      melatonin 3 MG tablet Take 1 tablet (3 mg) by mouth nightly as needed for sleep, Disp-90 tablet, R-3, E-Prescribe      multivitamin, therapeutic (THERA-VIT) TABS tablet Take 1 tablet by mouth daily, Historical      omeprazole (PRILOSEC OTC) 20 MG EC tablet Take 20 mg by mouth daily, Historical      polyethylene glycol (MIRALAX) 17 GM/Dose powder Take 17 g (1 Capful) by mouth daily, Disp-850 g, R-4, E-Prescribe      potassium chloride ER (KLOR-CON M) 20 MEQ CR tablet Take 1 tablet (20 mEq) by mouth daily, Disp-90 tablet, R-3, E-Prescribe      sodium chloride (OCEAN) 0.65 % nasal spray Use 1 spray into each nostril daily, Disp-30 mL, R-0, E-Prescribe      thiamine (B-1) 100 MG tablet Take 1 tablet (100 mg) by mouth daily, Disp-90 tablet, R-3, E-Prescribe      vitamin D3 (CHOLECALCIFEROL) 50 mcg (2000 units) tablet Take 1 tablet (50 mcg) by mouth daily, Disp-90 tablet, R-3, E-Prescribe           STOP taking these medications       OLANZapine (ZYPREXA) 5 MG tablet Comments:   Reason for Stopping:             Allergies   Allergies   Allergen Reactions    Fluoxetine Other (See Comments) and Swelling     Blurred vision, heart racing, face swelling    Amoxicillin Other (See Comments)     \"Thrush\"     "

## 2023-11-20 ENCOUNTER — OFFICE VISIT (OUTPATIENT)
Dept: FAMILY MEDICINE | Facility: OTHER | Age: 48
End: 2023-11-20
Attending: FAMILY MEDICINE
Payer: OTHER GOVERNMENT

## 2023-11-20 VITALS
SYSTOLIC BLOOD PRESSURE: 110 MMHG | RESPIRATION RATE: 18 BRPM | BODY MASS INDEX: 16.73 KG/M2 | DIASTOLIC BLOOD PRESSURE: 68 MMHG | TEMPERATURE: 97.6 F | HEART RATE: 82 BPM | OXYGEN SATURATION: 95 % | HEIGHT: 64 IN | WEIGHT: 98 LBS

## 2023-11-20 DIAGNOSIS — Z12.11 SPECIAL SCREENING FOR MALIGNANT NEOPLASMS, COLON: ICD-10-CM

## 2023-11-20 DIAGNOSIS — S32.511A CLOSED FRACTURE OF SUPERIOR RAMUS OF RIGHT PUBIS, INITIAL ENCOUNTER (H): Primary | ICD-10-CM

## 2023-11-20 DIAGNOSIS — Z13.9 SCREENING FOR CONDITION: ICD-10-CM

## 2023-11-20 PROCEDURE — 99495 TRANSJ CARE MGMT MOD F2F 14D: CPT | Performed by: FAMILY MEDICINE

## 2023-11-20 RX ORDER — HYDROXYZINE PAMOATE 25 MG/1
25 CAPSULE ORAL 3 TIMES DAILY PRN
Status: CANCELLED | OUTPATIENT
Start: 2023-11-20

## 2023-11-20 RX ORDER — OXYCODONE HYDROCHLORIDE 5 MG/1
5 TABLET ORAL EVERY 4 HOURS PRN
Status: CANCELLED | OUTPATIENT
Start: 2023-11-20

## 2023-11-20 ASSESSMENT — PAIN SCALES - GENERAL: PAINLEVEL: SEVERE PAIN (7)

## 2023-11-20 NOTE — PROGRESS NOTES
Assessment & Plan     Closed fracture of superior ramus of right pubis, initial encounter (H) proceed with DEXA scan    - DX Hip/Pelvis/Spine; Future    Screening for condition    - MA Screen Bilateral w/Joni; Future  - COLOGUARD(EXACT SCIENCES); Future           MED REC REQUIRED  Post Medication Reconciliation Status:  Discharge medications reconciled, continue medications without change    No follow-ups on file.    Sujit Bullock MD  [facility]    Subjective   Sara is a 48 year old, presenting for the following health issues:  Hospital F/U      11/20/2023     1:43 PM   Additional Questions   Roomed by angelique grider lpn   Accompanied by none         11/20/2023     1:43 PM   Patient Reported Additional Medications   Patient reports taking the following new medications oxy       Patient arrives here for follow-up after sustaining a fracture to her pelvis.  She had fallen and.  Subsequently spent time in the hospital.  She reports that she is doing a little better since follow-up.    History of Present Illness               Hospital Follow-up Visit:    Hospital/Nursing Home/IP Rehab Facility: [facility]  Date of Admission: 11/08  Date of Discharge: 11/09  Reason(s) for Admission: close non displace pelvic fracture     Was your hospitalization related to COVID-19? No   Problems taking medications regularly:  None  Medication changes since discharge: None  Problems adhering to non-medication therapy:  None    Summary of hospitalization:  [facility] discharge summary reviewed  Diagnostic Tests/Treatments reviewed.  Follow up needed: phys therapy   Other Healthcare Providers Involved in Patient s Care:         Physical Therapy  Update since discharge: stable.         Plan of care communicated with patient                   Review of Systems         Objective    /68 (BP Location: Right arm, Patient Position: Sitting, Cuff Size: Child)   Pulse 82   Temp 97.6  F  "(36.4  C) (Tympanic)   Resp 18   Ht 1.626 m (5' 4\")   Wt 44.5 kg (98 lb)   LMP 01/01/2013 (Within Years)   SpO2 95%   BMI 16.82 kg/m    Body mass index is 16.82 kg/m .  Physical Exam  Constitutional:       Appearance: Normal appearance.   Neurological:      Mental Status: She is alert.   Psychiatric:         Mood and Affect: Mood normal.        GENERAL: healthy, alert and no distress  NECK: no adenopathy, no asymmetry, masses, or scars and thyroid normal to palpation  RESP: lungs clear to auscultation - no rales, rhonchi or wheezes  CV: regular rate and rhythm, normal S1 S2, no S3 or S4, no murmur, click or rub, no peripheral edema and peripheral pulses strong  ABDOMEN: soft, nontender, no hepatosplenomegaly, no masses and bowel sounds normal  MS: no gross musculoskeletal defects noted, no edema                    "

## 2023-11-20 NOTE — NURSING NOTE
"Patient presents to the clinic for hospital follow up.    FOOD SECURITY SCREENING QUESTIONS:    The next two questions are to help us understand your food security.  If you are feeling you need any assistance in this area, we have resources available to support you today.    Hunger Vital Signs:  Within the past 12 months we worried whether our food would run out before we got money to buy more. Never  Within the past 12 months the food we bought just didn't last and we didn't have money to get more. Never    Advance Care Directive on file? no  Advance Care Directive provided to patient? Declined.    Chief Complaint   Patient presents with    Hospital F/U       Initial /68 (BP Location: Right arm, Patient Position: Sitting, Cuff Size: Child)   Pulse 82   Temp 97.6  F (36.4  C) (Tympanic)   Resp 18   Ht 1.626 m (5' 4\")   Wt 44.5 kg (98 lb)   LMP 01/01/2013 (Within Years)   SpO2 95%   BMI 16.82 kg/m   Estimated body mass index is 16.82 kg/m  as calculated from the following:    Height as of this encounter: 1.626 m (5' 4\").    Weight as of this encounter: 44.5 kg (98 lb).  Medication Reconciliation: complete        Barb Gabriel LPN     "

## 2023-11-29 ENCOUNTER — OFFICE VISIT (OUTPATIENT)
Dept: FAMILY MEDICINE | Facility: OTHER | Age: 48
End: 2023-11-29
Attending: PHYSICIAN ASSISTANT
Payer: OTHER GOVERNMENT

## 2023-11-29 ENCOUNTER — HOSPITAL ENCOUNTER (OUTPATIENT)
Dept: GENERAL RADIOLOGY | Facility: OTHER | Age: 48
Discharge: HOME | End: 2023-11-29
Attending: FAMILY MEDICINE
Payer: OTHER GOVERNMENT

## 2023-11-29 VITALS
SYSTOLIC BLOOD PRESSURE: 102 MMHG | TEMPERATURE: 98.1 F | BODY MASS INDEX: 16.68 KG/M2 | WEIGHT: 97.2 LBS | HEART RATE: 85 BPM | RESPIRATION RATE: 16 BRPM | DIASTOLIC BLOOD PRESSURE: 62 MMHG | OXYGEN SATURATION: 98 %

## 2023-11-29 DIAGNOSIS — S32.511A CLOSED FRACTURE OF SUPERIOR RAMUS OF RIGHT PUBIS, INITIAL ENCOUNTER (H): Primary | ICD-10-CM

## 2023-11-29 DIAGNOSIS — R63.0 ANOREXIA: ICD-10-CM

## 2023-11-29 DIAGNOSIS — S32.591A CLOSED FRACTURE OF RIGHT INFERIOR PUBIC RAMUS, INITIAL ENCOUNTER (H): ICD-10-CM

## 2023-11-29 PROCEDURE — 73502 X-RAY EXAM HIP UNI 2-3 VIEWS: CPT

## 2023-11-29 PROCEDURE — 27197 CLSD TX PELVIC RING FX: CPT | Mod: RT | Performed by: FAMILY MEDICINE

## 2023-11-29 RX ORDER — NEOMYCIN SULFATE, POLYMYXIN B SULFATE AND DEXAMETHASONE 3.5; 10000; 1 MG/ML; [USP'U]/ML; MG/ML
SUSPENSION/ DROPS OPHTHALMIC
COMMUNITY
Start: 2023-11-28 | End: 2024-06-12

## 2023-11-29 RX ORDER — OLOPATADINE HYDROCHLORIDE 1 MG/ML
SOLUTION/ DROPS OPHTHALMIC
COMMUNITY
Start: 2023-06-21 | End: 2024-06-12

## 2023-11-29 ASSESSMENT — PAIN SCALES - GENERAL: PAINLEVEL: EXTREME PAIN (8)

## 2023-11-29 NOTE — PROGRESS NOTES
Sports Medicine Office Note    HPI:  48-year-old anorexic female coming for evaluation of right-sided superior and inferior pubic ramus fractures that occurred due to a fall on .  She was seen in the ER and subsequently admitted to the hospital.  She has been ambulating with a cane in each hand.  She was seen in primary care clinic last week and a DEXA scan was ordered.  She has not yet had this performed.  Unfortunately she suffered a subsequent fall on  which has exacerbated her pain.  Her current pain is 8/10.  She characterizes the pain as throbbing she has difficulty with lifting, squatting, sitting, and going up/down stairs.  She has been using heat, ice, and over-the-counter medications.  She has associated swelling and walks with a limp.      EXAM:  /62 (BP Location: Right arm, Patient Position: Sitting, Cuff Size: Adult Regular)   Pulse 85   Temp 98.1  F (36.7  C) (Temporal)   Resp 16   Wt 44.1 kg (97 lb 3.2 oz)   LMP 2013 (Within Years)   SpO2 98%   BMI 16.68 kg/m    MUSCULOSKELETAL EXAM:  RIGHT HIP  Inspection:  -No gross deformity    Tenderness to palpation of the:  -Anterior hip joint: Positive  -ASIS:  Negative  -Greater trochanter: Mild pain  -Pelvic ala:  Negative  -Pubic ramus: Positive  -Proximal adductor origin: Mild pain    Strength:  -Knee extension:  4+/5  -Knee flexion:  4/5  -Hip abduction:  5/5  -Hip adduction:  4/5  -Hip flexion:  4/5  -Hip extension:  4/5    Special Tests:  -Logrolling maneuver: Positive  -Stinchfield test: Positive      Other:  -Intact sensation to light touch distally.  -No signs of cyanosis. Normal skin temperature of the lower extremity.  -Knee/foot/ankle:  No gross deformity. Full range of motion.  -Left hip:  No gross deformity. No palpable tenderness. Normal strength and ROM.      IMAGIN2023: CT pelvis  - Nondisplaced fractures to the superior and inferior pubic rami.  Mild degenerative changes of the femoral acetabular joint.   No acute bony abnormalities on the left side.    11/29/2023: Pelvis and 2 view right hip x-ray  - Superior and inferior pubic rami fractures are again noted.  There does appear to be some increased displacement of these fractures compared to previous CT.      ASSESSMENT/PLAN:  Diagnoses and all orders for this visit:  Closed fracture of superior ramus of right pubis, initial encounter (H)  -     XR Pelvis w Hip Right G/E 2 Views  -     VT CLSD TX PELVIC RING FX, UNI/CLINTON, W/O MANIP  Closed fracture of right inferior pubic ramus, initial encounter (H)  -     XR Pelvis w Hip Right G/E 2 Views  -     VT CLSD TX PELVIC RING FX, UNI/CLINTON, W/O MANIP  Anorexia    48-year-old anorexic female with superior and inferior right-sided pubic rami fractures.  CT from 11/8 and x-rays from 11/29 were both personally reviewed in the office with findings as demonstrated above by my interpretation.  Her fractures have shifted but still appear adequately aligned to move forward with nonoperative treatment.  Her anorexia has likely led to early onset osteoporosis and the subsequent fractures due to a fall.  - Continue protected weightbearing  - Avoid activities that significantly worsen pain  - Continue vitamin D and calcium supplementation  - DEXA scan ordered through primary care  - Follow-up in 2 weeks for repeat x-rays (AP pelvis)  - Likely 3 weeks after that we will see her again for another set of repeat x-rays and possibly transition into the rehabilitation phase at that time      Alton Wynne MD  11/29/2023  1:47 PM    Total time spent with this patient was 38 minutes which included chart review, visualization and independent interpretation of images, time spent with the patient, and documentation.    Procedure time:  0 minute(s)

## 2023-12-04 ENCOUNTER — LAB (OUTPATIENT)
Dept: FAMILY MEDICINE | Facility: OTHER | Age: 48
End: 2023-12-04
Payer: OTHER GOVERNMENT

## 2023-12-04 DIAGNOSIS — Z13.9 SCREENING FOR CONDITION: ICD-10-CM

## 2023-12-14 ENCOUNTER — HOSPITAL ENCOUNTER (OUTPATIENT)
Dept: GENERAL RADIOLOGY | Facility: OTHER | Age: 48
Discharge: HOME | End: 2023-12-14
Attending: FAMILY MEDICINE
Payer: OTHER GOVERNMENT

## 2023-12-14 ENCOUNTER — OFFICE VISIT (OUTPATIENT)
Dept: FAMILY MEDICINE | Facility: OTHER | Age: 48
End: 2023-12-14
Attending: FAMILY MEDICINE
Payer: OTHER GOVERNMENT

## 2023-12-14 VITALS
WEIGHT: 93.8 LBS | RESPIRATION RATE: 16 BRPM | BODY MASS INDEX: 16.1 KG/M2 | HEART RATE: 76 BPM | TEMPERATURE: 99.2 F | SYSTOLIC BLOOD PRESSURE: 100 MMHG | OXYGEN SATURATION: 99 % | DIASTOLIC BLOOD PRESSURE: 64 MMHG

## 2023-12-14 DIAGNOSIS — S32.511D CLOSED FRACTURE OF SUPERIOR RAMUS OF RIGHT PUBIS WITH ROUTINE HEALING, SUBSEQUENT ENCOUNTER: Primary | ICD-10-CM

## 2023-12-14 DIAGNOSIS — S32.591D CLOSED FRACTURE OF RIGHT INFERIOR PUBIC RAMUS WITH ROUTINE HEALING, SUBSEQUENT ENCOUNTER: ICD-10-CM

## 2023-12-14 PROCEDURE — 99207 PR FRACTURE CARE IN GLOBAL PERIOD: CPT | Performed by: FAMILY MEDICINE

## 2023-12-14 PROCEDURE — 72170 X-RAY EXAM OF PELVIS: CPT

## 2023-12-14 ASSESSMENT — PAIN SCALES - GENERAL: PAINLEVEL: MILD PAIN (2)

## 2023-12-14 NOTE — PROGRESS NOTES
Sports Medicine Office Note    HPI:  48-year-old anorexic female coming for follow-up evaluation of right sided superior and inferior pubic ramus fractures that occurred due to a fall on .  She was seen in the ER and ultimately underwent hospitalization.  She was seen in this office on .  She is currently undergoing protected weightbearing.  She is currently endorsing 2/10 pain.  She characterized the pain as aching and throbbing.  She still feels like she is walking with a limp.      EXAM:  /64 (BP Location: Right arm, Patient Position: Sitting, Cuff Size: Adult Regular)   Pulse 76   Temp 99.2  F (37.3  C) (Temporal)   Resp 16   Wt 42.5 kg (93 lb 12.8 oz)   LMP 2013 (Within Years)   SpO2 99%   BMI 16.10 kg/m    Musculoskeletal exam: Pelvis  - No gross deformity  - Antalgic gait      IMAGIN2023: CT pelvis  - Nondisplaced fractures to the superior and inferior pubic rami.  Mild degenerative changes of the femoral acetabular joint.  No acute bony abnormalities on the left side.     2023: Pelvis and 2 view right hip x-ray  - Superior and inferior pubic rami fractures are again noted.  There does appear to be some increased displacement of these fractures compared to previous CT.    2023: 1 view pelvis x-ray  -Fractures to the superior and inferior pubic rami are again noted.  No change in bony alignment.  Interval bony callus formation is demonstrated.      ASSESSMENT/PLAN:  Diagnoses and all orders for this visit:  Closed fracture of superior ramus of right pubis with routine healing, subsequent encounter  -     XR Pelvis 1/2 Views  -     Physical Therapy Referral; Future  Closed fracture of right inferior pubic ramus with routine healing, subsequent encounter  -     XR Pelvis 1/2 Views  -     Physical Therapy Referral; Future    48-year-old anorexic female with superior and inferior right-sided pubic rami fractures.  CT from  and x-rays from  and  were  both personally reviewed in the office with the findings as demonstrated above by my interpretation.  She is now 5 weeks and 1 day out from her injury.  - Continue protected weightbearing  - Continue calcium and vitamin D supplementation  - Activities as pain allows  - Referral placed to physical therapy, to begin after follow-up visit  - Follow-up in 3 weeks for a repeat x-ray (AP pelvis)      Alton Wynne MD  12/14/2023  1:27 PM    Total time spent with this patient was 20 minutes which included chart review, visualization and independent interpretation of images, time spent with the patient, and documentation.    Procedure time:  0 minute(s)

## 2023-12-19 ENCOUNTER — LAB (OUTPATIENT)
Dept: FAMILY MEDICINE | Facility: OTHER | Age: 48
End: 2023-12-19
Payer: OTHER GOVERNMENT

## 2023-12-19 DIAGNOSIS — Z12.11 SPECIAL SCREENING FOR MALIGNANT NEOPLASMS, COLON: ICD-10-CM

## 2023-12-26 ENCOUNTER — HOSPITAL ENCOUNTER (OUTPATIENT)
Dept: MAMMOGRAPHY | Facility: OTHER | Age: 48
Discharge: HOME | End: 2023-12-26
Attending: FAMILY MEDICINE | Admitting: FAMILY MEDICINE
Payer: OTHER GOVERNMENT

## 2023-12-26 DIAGNOSIS — Z13.9 SCREENING FOR CONDITION: ICD-10-CM

## 2023-12-26 DIAGNOSIS — Z12.31 VISIT FOR SCREENING MAMMOGRAM: ICD-10-CM

## 2023-12-26 PROCEDURE — 77067 SCR MAMMO BI INCL CAD: CPT

## 2023-12-28 ENCOUNTER — TRANSFERRED RECORDS (OUTPATIENT)
Dept: HEALTH INFORMATION MANAGEMENT | Facility: OTHER | Age: 48
End: 2023-12-28

## 2023-12-28 ENCOUNTER — HOSPITAL ENCOUNTER (EMERGENCY)
Facility: OTHER | Age: 48
Discharge: ANOTHER HOSPITAL | End: 2023-12-28
Attending: STUDENT IN AN ORGANIZED HEALTH CARE EDUCATION/TRAINING PROGRAM | Admitting: STUDENT IN AN ORGANIZED HEALTH CARE EDUCATION/TRAINING PROGRAM
Payer: OTHER GOVERNMENT

## 2023-12-28 ENCOUNTER — APPOINTMENT (OUTPATIENT)
Dept: GENERAL RADIOLOGY | Facility: OTHER | Age: 48
End: 2023-12-28
Attending: STUDENT IN AN ORGANIZED HEALTH CARE EDUCATION/TRAINING PROGRAM
Payer: OTHER GOVERNMENT

## 2023-12-28 ENCOUNTER — APPOINTMENT (OUTPATIENT)
Dept: CT IMAGING | Facility: OTHER | Age: 48
End: 2023-12-28
Attending: STUDENT IN AN ORGANIZED HEALTH CARE EDUCATION/TRAINING PROGRAM
Payer: OTHER GOVERNMENT

## 2023-12-28 VITALS
HEIGHT: 64 IN | TEMPERATURE: 98.3 F | HEART RATE: 108 BPM | WEIGHT: 94 LBS | BODY MASS INDEX: 16.05 KG/M2 | DIASTOLIC BLOOD PRESSURE: 75 MMHG | SYSTOLIC BLOOD PRESSURE: 99 MMHG | OXYGEN SATURATION: 95 % | RESPIRATION RATE: 17 BRPM

## 2023-12-28 DIAGNOSIS — S72.22XA CLOSED DISPLACED SUBTROCHANTERIC FRACTURE OF LEFT FEMUR, INITIAL ENCOUNTER (H): ICD-10-CM

## 2023-12-28 DIAGNOSIS — H69.93 DYSFUNCTION OF BOTH EUSTACHIAN TUBES: ICD-10-CM
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
## 2023-12-28 LAB
ABO/RH(D): NORMAL
ANION GAP SERPL CALCULATED.3IONS-SCNC: 9 MMOL/L (ref 7–15)
ANTIBODY SCREEN: NEGATIVE
ATRIAL RATE - MUSE: 72 BPM
BASOPHILS # BLD AUTO: 0.1 10E3/UL (ref 0–0.2)
BASOPHILS NFR BLD AUTO: 1 %
BUN SERPL-MCNC: 15.6 MG/DL (ref 6–20)
CALCIUM SERPL-MCNC: 8.6 MG/DL (ref 8.6–10)
CHLORIDE SERPL-SCNC: 98 MMOL/L (ref 98–107)
CREAT SERPL-MCNC: 0.82 MG/DL (ref 0.51–0.95)
DEPRECATED HCO3 PLAS-SCNC: 31 MMOL/L (ref 22–29)
DIASTOLIC BLOOD PRESSURE - MUSE: NORMAL MMHG
EGFRCR SERPLBLD CKD-EPI 2021: 87 ML/MIN/1.73M2
EOSINOPHIL # BLD AUTO: 0.2 10E3/UL (ref 0–0.7)
EOSINOPHIL NFR BLD AUTO: 3 %
ERYTHROCYTE [DISTWIDTH] IN BLOOD BY AUTOMATED COUNT: 12.6 % (ref 10–15)
GLUCOSE BLDC GLUCOMTR-MCNC: 87 MG/DL (ref 70–99)
GLUCOSE SERPL-MCNC: 87 MG/DL (ref 70–99)
HCT VFR BLD AUTO: 36.5 % (ref 35–47)
HGB BLD-MCNC: 12.3 G/DL (ref 11.7–15.7)
HOLD SPECIMEN: NORMAL
IMM GRANULOCYTES # BLD: 0 10E3/UL
IMM GRANULOCYTES NFR BLD: 0 %
INTERPRETATION ECG - MUSE: NORMAL
LYMPHOCYTES # BLD AUTO: 3 10E3/UL (ref 0.8–5.3)
LYMPHOCYTES NFR BLD AUTO: 44 %
MCH RBC QN AUTO: 33.4 PG (ref 26.5–33)
MCHC RBC AUTO-ENTMCNC: 33.7 G/DL (ref 31.5–36.5)
MCV RBC AUTO: 99 FL (ref 78–100)
MONOCYTES # BLD AUTO: 0.5 10E3/UL (ref 0–1.3)
MONOCYTES NFR BLD AUTO: 7 %
NEUTROPHILS # BLD AUTO: 3.2 10E3/UL (ref 1.6–8.3)
NEUTROPHILS NFR BLD AUTO: 45 %
NRBC # BLD AUTO: 0 10E3/UL
NRBC BLD AUTO-RTO: 0 /100
P AXIS - MUSE: 63 DEGREES
PLATELET # BLD AUTO: 333 10E3/UL (ref 150–450)
POTASSIUM SERPL-SCNC: 3.6 MMOL/L (ref 3.4–5.3)
PR INTERVAL - MUSE: 150 MS
QRS DURATION - MUSE: 92 MS
QT - MUSE: 414 MS
QTC - MUSE: 453 MS
R AXIS - MUSE: 74 DEGREES
RBC # BLD AUTO: 3.68 10E6/UL (ref 3.8–5.2)
SODIUM SERPL-SCNC: 138 MMOL/L (ref 135–145)
SPECIMEN EXPIRATION DATE: NORMAL
SYSTOLIC BLOOD PRESSURE - MUSE: NORMAL MMHG
T AXIS - MUSE: 22 DEGREES
VENTRICULAR RATE- MUSE: 72 BPM
WBC # BLD AUTO: 6.9 10E3/UL (ref 4–11)

## 2023-12-28 PROCEDURE — 85025 COMPLETE CBC W/AUTO DIFF WBC: CPT | Performed by: STUDENT IN AN ORGANIZED HEALTH CARE EDUCATION/TRAINING PROGRAM

## 2023-12-28 PROCEDURE — 70450 CT HEAD/BRAIN W/O DYE: CPT | Mod: TC

## 2023-12-28 PROCEDURE — 99285 EMERGENCY DEPT VISIT HI MDM: CPT | Performed by: STUDENT IN AN ORGANIZED HEALTH CARE EDUCATION/TRAINING PROGRAM

## 2023-12-28 PROCEDURE — 73560 X-RAY EXAM OF KNEE 1 OR 2: CPT | Mod: TC,LT

## 2023-12-28 PROCEDURE — 96375 TX/PRO/DX INJ NEW DRUG ADDON: CPT | Performed by: STUDENT IN AN ORGANIZED HEALTH CARE EDUCATION/TRAINING PROGRAM

## 2023-12-28 PROCEDURE — 80048 BASIC METABOLIC PNL TOTAL CA: CPT | Performed by: STUDENT IN AN ORGANIZED HEALTH CARE EDUCATION/TRAINING PROGRAM

## 2023-12-28 PROCEDURE — 96374 THER/PROPH/DIAG INJ IV PUSH: CPT | Performed by: STUDENT IN AN ORGANIZED HEALTH CARE EDUCATION/TRAINING PROGRAM

## 2023-12-28 PROCEDURE — 72170 X-RAY EXAM OF PELVIS: CPT | Mod: TC,XU

## 2023-12-28 PROCEDURE — 72125 CT NECK SPINE W/O DYE: CPT | Mod: TC

## 2023-12-28 PROCEDURE — 36415 COLL VENOUS BLD VENIPUNCTURE: CPT | Performed by: STUDENT IN AN ORGANIZED HEALTH CARE EDUCATION/TRAINING PROGRAM

## 2023-12-28 PROCEDURE — 73502 X-RAY EXAM HIP UNI 2-3 VIEWS: CPT | Mod: TC

## 2023-12-28 PROCEDURE — 93010 ELECTROCARDIOGRAM REPORT: CPT | Performed by: INTERNAL MEDICINE

## 2023-12-28 PROCEDURE — 99285 EMERGENCY DEPT VISIT HI MDM: CPT | Mod: 25 | Performed by: STUDENT IN AN ORGANIZED HEALTH CARE EDUCATION/TRAINING PROGRAM

## 2023-12-28 PROCEDURE — 86900 BLOOD TYPING SEROLOGIC ABO: CPT | Performed by: STUDENT IN AN ORGANIZED HEALTH CARE EDUCATION/TRAINING PROGRAM

## 2023-12-28 PROCEDURE — 93005 ELECTROCARDIOGRAM TRACING: CPT | Performed by: STUDENT IN AN ORGANIZED HEALTH CARE EDUCATION/TRAINING PROGRAM

## 2023-12-28 PROCEDURE — 82962 GLUCOSE BLOOD TEST: CPT

## 2023-12-28 PROCEDURE — 72192 CT PELVIS W/O DYE: CPT | Mod: TC

## 2023-12-28 PROCEDURE — 250N000011 HC RX IP 250 OP 636: Performed by: STUDENT IN AN ORGANIZED HEALTH CARE EDUCATION/TRAINING PROGRAM

## 2023-12-28 RX ORDER — HYDROMORPHONE HYDROCHLORIDE 1 MG/ML
0.5 INJECTION, SOLUTION INTRAMUSCULAR; INTRAVENOUS; SUBCUTANEOUS ONCE
Status: COMPLETED | OUTPATIENT
Start: 2023-12-28 | End: 2023-12-28

## 2023-12-28 RX ORDER — FLUTICASONE PROPIONATE 50 MCG
1 SPRAY, SUSPENSION (ML) NASAL DAILY
Qty: 11.1 ML | Refills: 0 | Status: SHIPPED | OUTPATIENT
Start: 2023-12-28 | End: 2024-03-01

## 2023-12-28 RX ORDER — FENTANYL CITRATE 50 UG/ML
50 INJECTION, SOLUTION INTRAMUSCULAR; INTRAVENOUS
Status: DISCONTINUED | OUTPATIENT
Start: 2023-12-28 | End: 2023-12-28 | Stop reason: HOSPADM

## 2023-12-28 RX ADMIN — HYDROMORPHONE HYDROCHLORIDE 0.5 MG: 1 INJECTION, SOLUTION INTRAMUSCULAR; INTRAVENOUS; SUBCUTANEOUS at 03:00

## 2023-12-28 RX ADMIN — FENTANYL CITRATE 50 MCG: 50 INJECTION INTRAMUSCULAR; INTRAVENOUS at 01:05

## 2023-12-28 ASSESSMENT — ACTIVITIES OF DAILY LIVING (ADL)
ADLS_ACUITY_SCORE: 35
ADLS_ACUITY_SCORE: 35

## 2023-12-28 NOTE — TELEPHONE ENCOUNTER
[facility] sent Rx request for the following:      Requested Prescriptions   Pending Prescriptions Disp Refills    fluticasone (FLONASE) 50 MCG/ACT nasal spray 11.1 mL 0     Sig: Spray 1 spray into both nostrils daily       Nasal Allergy Protocol Passed - 12/28/2023  8:26 AM     Last Prescription Date:   10/23/23  Last Fill Qty/Refills:         11.1 ml, R-0    Last Office Visit:              11/20/23   Future Office visit:           4/23/24    Tammy Carr RN on 12/28/2023 at 12:59 PM

## 2023-12-28 NOTE — ED PROVIDER NOTES
Emergency Department Provider Note  : 1975 Age: 48 year old Sex: female MRN: [id number]    Chief Complaint   Patient presents with    Fall       Medical Decision Making / Assessment / Plan   48 year old female with a PMH of osteopenia presents after a ground-level fall with clear fracture deformity of the left hip.  There is skin tenting I was clearly proximal femur fracture with significant deformity and internal rotation foreshortening of the left leg there is quickly reduced and the patient arrives.  She is CMS intact distally which is reassuring.  Hip appears to be held in place when the patient's leg is kept in abduction and the knee is fully extended.  Will attempt to get a hip abduction pillow to maintain placement.  X-rays of the pelvis, hip, knees pending.  Given analgesia.  Patient has a history of a remote TBI and has an affect that can be described as incongruent with the degree of pain she describes.  Will obtain head and C-spine images.     Per my initial review, I do not see evidence of an intraparenchymal hemorrhage no evidence of lucero C-spine injury.  There is quite a delay in interpretation of patient's x-rays but my view shows what appears to be a subtrochanteric left femur fracture with comminuted component of the intertrochanteric area and avulsion fracture of the lesser trochanter.  No traction device available here so to keep the patient leg out to length, knee immobilizer was placed in the left knee, makeshift hip abduction device and traction device configured which seems to be holding the patient's leg out to length quite decently.  Remains CMS intact distally.  [facility] and sounds like the patient will need to transfer to [facility] given no one will be operating here for the remainder of the week.  Spoke with Dr. Colvin who accepts transfer to [facility] emergency department.      New Prescriptions    No medications on file       Final  "diagnoses:   Ground-level fall   Closed displaced subtrochanteric fracture of left femur, initial encounter (H)       Kosta Wadsworth MD  12/28/2023   Emergency Department    Subjective   Sara is a 48 year old female with PMH of osteopenia and prior pelvic fractures who presents at 12:31 AM for evaluation of a fall that she describes as secondary to tripping on a table at home with inability to get up afterwards with clear deformity of her left hip.  Patient denies loss of consciousness and denies anticoagulation use.  Other than pain in her hip, she denies pain elsewhere.  Denies alcohol or illicit substance use this evening.    I have reviewed the Medications, Allergies, Past Medical and Surgical History, and Social History in the [facility] and with family.    Review of Systems:  Please see HPI for pertinent positives and negatives. All other systems reviewed and found to be negative.      Objective   BP: 106/63  Pulse: 91  Temp: 98.3  F (36.8  C)  Resp: 17  Height: 162.6 cm (5' 4\")  Weight: 42.6 kg (94 lb)  SpO2: 96 %    Physical Exam:   Gen: Does not appear in acute distress despite reporting 10 out of 10 pain.  HEENT: Very superficial laceration over the superolateral orbital rim with no palpable underlying bony deformity and no hematoma.  Eye: EOMI. PERRL  CV: Intact and symmetric distal pulses bilateral lower extremities   DP and PT.  Pulm: Nonlabored, equal chest rise  Abd: ND.  Nontender to palpation.  Musculoskeletal: Nontender to palpation across the midline spines without bony step-off.  Nontender to compression of her chest wall.  Ext: There is clear refracture and deformity with skin tenting over the left proximal lateral hip.  The leg is held in internal rotation with significant foreshortening.  Neuro: Alert and oriented but appears to have a somewhat depressed mental status.  Able to fire all motor units in the left lower leg.  Sensation appears grossly intact.    Procedures / Critical Care "   Procedures    Critical Care Time: none         Medical/Surgical History:  Past Medical History:   Diagnosis Date    Anorexia     Atypical depression     Hypothyroidism     Intracranial hemorrhage (H) 2018    Osteopenia     PTSD (post-traumatic stress disorder)     Seizure disorder (H)     TBI (traumatic brain injury) (H) 05/2018     Past Surgical History:   Procedure Laterality Date    CRANIOPLASTY  08/08/2018    CRANIOTOMY  05/2018    TRACHEOSTOMY  06/2018       Medications:  Current Facility-Administered Medications   Medication    fentaNYL (PF) (SUBLIMAZE) injection 50 mcg    HYDROmorphone (PF) (DILAUDID) injection 0.5 mg     Current Outpatient Medications   Medication    acetaminophen (TYLENOL) 500 MG tablet    calcium carbonate (OS-MOISES) 500 MG tablet    cyanocobalamin (VITAMIN B-12) 1000 MCG tablet    docusate sodium (COLACE) 100 MG capsule    fluticasone (FLONASE) 50 MCG/ACT nasal spray    Folic Acid-Vit B6-Vit B12 (FOLBEE) 2.5-25-1 MG TABS    hydrOXYzine (VISTARIL) 25 MG capsule    IBANdronate (BONIVA) 150 MG tablet    ibuprofen (ADVIL/MOTRIN) 200 MG tablet    Lacosamide (VIMPAT) 100 MG TABS tablet    lamoTRIgine (LAMICTAL) 200 MG tablet    levothyroxine (SYNTHROID/LEVOTHROID) 50 MCG tablet    magnesium hydroxide (MILK OF MAGNESIA) 400 MG/5ML suspension    melatonin 3 MG tablet    multivitamin, therapeutic (THERA-VIT) TABS tablet    neomycin-polymyxin-dexAMETHasone (MAXITROL) 3.5-21610-4.1 SUSP ophthalmic susp    olopatadine (PATANOL) 0.1 % ophthalmic solution    omeprazole (PRILOSEC OTC) 20 MG EC tablet    oxyCODONE (ROXICODONE) 5 MG tablet    polyethylene glycol (MIRALAX) 17 GM/Dose powder    potassium chloride ER (KLOR-CON M) 20 MEQ CR tablet    sodium chloride (OCEAN) 0.65 % nasal spray    thiamine (B-1) 100 MG tablet    vitamin D3 (CHOLECALCIFEROL) 50 mcg (2000 units) tablet       Allergies:  Fluoxetine and Amoxicillin    Relevant labs, images, EKGs, Epic and outside hospital (if applicable) charts  were reviewed. The findings, diagnosis, plan, and need for follow up were discussed with the patient/family. Nursing notes were reviewed.      Kosta Wadsworth MD  12/28/23 0244

## 2024-01-04 ENCOUNTER — OFFICE VISIT (OUTPATIENT)
Dept: FAMILY MEDICINE | Facility: OTHER | Age: 49
End: 2024-01-04
Attending: FAMILY MEDICINE
Payer: OTHER GOVERNMENT

## 2024-01-04 ENCOUNTER — HOSPITAL ENCOUNTER (OUTPATIENT)
Dept: GENERAL RADIOLOGY | Facility: OTHER | Age: 49
Discharge: HOME | End: 2024-01-04
Attending: FAMILY MEDICINE
Payer: OTHER GOVERNMENT

## 2024-01-04 VITALS
BODY MASS INDEX: 14.08 KG/M2 | RESPIRATION RATE: 16 BRPM | TEMPERATURE: 98.9 F | DIASTOLIC BLOOD PRESSURE: 60 MMHG | WEIGHT: 82 LBS | SYSTOLIC BLOOD PRESSURE: 100 MMHG

## 2024-01-04 DIAGNOSIS — S32.511D CLOSED FRACTURE OF SUPERIOR RAMUS OF RIGHT PUBIS WITH ROUTINE HEALING, SUBSEQUENT ENCOUNTER: Primary | ICD-10-CM

## 2024-01-04 DIAGNOSIS — R63.0 ANOREXIA: ICD-10-CM

## 2024-01-04 DIAGNOSIS — S32.591D CLOSED FRACTURE OF RIGHT INFERIOR PUBIC RAMUS WITH ROUTINE HEALING, SUBSEQUENT ENCOUNTER: ICD-10-CM

## 2024-01-04 DIAGNOSIS — S72.002A CLOSED FRACTURE OF NECK OF LEFT FEMUR, INITIAL ENCOUNTER (H): ICD-10-CM

## 2024-01-04 DIAGNOSIS — Z87.81 STATUS POST-OPERATIVE REPAIR OF CLOSED FRACTURE OF LEFT HIP: ICD-10-CM

## 2024-01-04 DIAGNOSIS — Z98.890 STATUS POST-OPERATIVE REPAIR OF CLOSED FRACTURE OF LEFT HIP: ICD-10-CM

## 2024-01-04 PROCEDURE — 99207 PR FRACTURE CARE IN GLOBAL PERIOD: CPT | Mod: 24 | Performed by: FAMILY MEDICINE

## 2024-01-04 PROCEDURE — 72170 X-RAY EXAM OF PELVIS: CPT

## 2024-01-04 ASSESSMENT — PAIN SCALES - GENERAL: PAINLEVEL: SEVERE PAIN (6)

## 2024-01-04 NOTE — PROGRESS NOTES
Sports Medicine Office Note    HPI:  48-year-old anorexic female coming in for follow-up evaluation of right-sided superior and inferior pubic rami fractures that occurred due to a fall on .  She was seen in the ER and ultimately underwent hospitalization.  She was able to follow-up in this office on  and again on .  At that time she was going to continue with another couple weeks of protected weightbearing with a follow-up appointment today to assess readiness for physical therapy.    Unfortunately, she suffered another fall late in the evening on .  She broke her left hip.  She underwent surgery at [facility].  She is currently endorsing 6/10 pain.  She characterizes the pain as stabbing.    She comes in today weighing 82 pounds with her jacket on.  Earlier this summer/fall with her jacket on she was 97 pounds.  She reports that while in the hospital she was not eating much.  She does not desire talking about this concern at today's visit.      EXAM:  /60 (BP Location: Right arm, Patient Position: Sitting, Cuff Size: Adult Regular)   Temp 98.9  F (37.2  C) (Temporal)   Resp 16   Wt 37.2 kg (82 lb)   LMP 2013 (Within Years)   BMI 14.08 kg/m    MUSCULOSKELETAL EXAM:  LEFT HIP  - Anorexic appearing female  - Significant bruising over the posterior, lateral, and medial aspects of the hip, buttock, and thigh  - Dressing in place over surgical sites about the lateral hip/thigh  - Ambulation with a walker      IMAGIN2023: CT pelvis  - Nondisplaced fractures to the superior and inferior pubic rami.  Mild degenerative changes of the femoral acetabular joint.  No acute bony abnormalities on the left side.     2023: Pelvis and 2 view right hip x-ray  - Superior and inferior pubic rami fractures are again noted.  There does appear to be some increased displacement of these fractures compared to previous CT.    2024: 1 view pelvis x-ray  - Unchanged alignment of superior  and inferior pubic rami fractures.  Interval bony callus formation demonstrated.  There is new hardware of the left hip from a recent fracture.  Displaced trochanters.      ASSESSMENT/PLAN:  Diagnoses and all orders for this visit:  Closed fracture of superior ramus of right pubis with routine healing, subsequent encounter  -     XR Pelvis 1/2 Views  Closed fracture of right inferior pubic ramus with routine healing, subsequent encounter  -     XR Pelvis 1/2 Views  Closed fracture of neck of left femur, initial encounter (H)  Status post-operative repair of closed fracture of left hip  Anorexia    48-year-old anorexic female with superior and inferior pubic rami fractures.  She is now 8 weeks out from her injury.  With regards to these fractures, she is okay to move onto physical therapy.  - Management of recent surgical left hip will take precedence over rehabilitation for these pelvic fractures  - Physical therapy as indicated based on surgical recommendations    Left hip fracture:  Progressing as expected.  Dressings intact and reinforced today.  - Management per surgical team    Anorexia:  Multiple fractures in the last 2 months.  Patient becomes quite defensive and does not wish to discuss this concern at today's visit though this is likely a significant contributing factor to her overall health and skeletal frailty.  This was discussed in person with her PCP.  - Encouraged follow-up with PCP at earliest convenience  - Continue with DEXA scan as previously ordered      Alton Wynne MD  1/4/2024  1:33 PM    Total time spent with this patient was 35 minutes which included chart review, visualization and independent interpretation of images, time spent with the patient, and documentation.    Procedure time:  0 minute(s)

## 2024-01-09 ENCOUNTER — OFFICE VISIT (OUTPATIENT)
Dept: OTOLARYNGOLOGY | Facility: OTHER | Age: 49
End: 2024-01-09
Payer: OTHER GOVERNMENT

## 2024-01-09 DIAGNOSIS — H93.19 TINNITUS DUE TO MYOKYMIA OF MIDDLE EAR MUSCULATURE: Primary | ICD-10-CM

## 2024-01-09 PROCEDURE — G0463 HOSPITAL OUTPT CLINIC VISIT: HCPCS

## 2024-01-16 ENCOUNTER — TELEPHONE (OUTPATIENT)
Dept: FAMILY MEDICINE | Facility: OTHER | Age: 49
End: 2024-01-16
Payer: OTHER GOVERNMENT

## 2024-01-16 NOTE — PROGRESS NOTES
document embedded image  Patient Name: Sara Liriano   Address: [address] [place] [address]    YOB: 1975   ALLIE TRAMMELL [address]   MR Number: [id number]   Phone: [phone]  PCP:            Appointment Date: 01/09/24  Visit Provider: Luca Houston MD    cc: ; ~    ENT Progress Note    Intake  Visit Reasons: Ear infections    HPI  History of Present Illness  Chief complaint:  Intermittent irregular popping noise in right ear    History  The patient is a 48-year-old female who presents to the office because she is intermittently noticed an irregular popping or cracking sudden her right ear.  It occasionally be stimulated by noise input such as when she uses a phone in the ear.  Her hearing history is otherwise unremarkable.      Exam   The external auditory canals and TMs are clear bilaterally  Oral cavity oropharynx-free of mucosal lesions or inflammation   Neck-no masses or adenopathy  Head neck integument-Clear  General-the patient appears well and in no distress  Neuro-there are no focal cranial nerve deficits    Allergies    nystatin Allergy (Severe, Verified 12/28/23 06:28)  Swelling    PFSH  PFSH:     Past Medical History: (Updated 01/09/24 @ 13:46 by Luca Houston MD)    Burst fracture of cervical vertebra  Premature menopause  Major depressive disorder, recurrent severe without psychotic features  Macrocytic anemia  Laxative abuse  Severe malnutrition  Hepatitis  SAH (subarachnoid hemorrhage)  SDH (subdural hematoma)  Post-traumatic epilepsy, non-refractory  Osteoporosis  Generalized anxiety disorder  History of traumatic brain injury  Passive suicidal ideations  Hypothyroid  Anorexia nervosa      Past Surgical History: (Reviewed 12/28/23 @ 11:45 by Evelyne Leach RN)    History of cranioplasty  8/8/18 with Dr. 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 No   Do you exercise regularly?:  Yes   Female Reproductive History:     ---------------------------------------------     Female Reproductive History     Birth control method: none  Pregnancy     : 0    A&P  Assessment & Plan  (1) Tinnitus due to myokymia of middle ear musculature:         Status: Acute        Code(s):  H93.19 - Tinnitus, unspecified ear  The patient was counseled that there is no significant effective medical management of this condition.  It was of no threat to her.  If it becomes a dramatic effect on her quality of life she can have middle ear surgery and transection of the tensor tympani muscle/tendon.                Luca Houston MD    Filed: 01/10/24 1709     <Electronically signed by Luca Houston MD> 01/10/24 1709

## 2024-01-17 ENCOUNTER — THERAPY VISIT (OUTPATIENT)
Dept: PHYSICAL THERAPY | Facility: OTHER | Age: 49
End: 2024-01-17
Attending: FAMILY MEDICINE
Payer: OTHER GOVERNMENT

## 2024-01-17 DIAGNOSIS — Z98.890 STATUS POST-OPERATIVE REPAIR OF CLOSED FRACTURE OF LEFT HIP: Primary | ICD-10-CM

## 2024-01-17 DIAGNOSIS — S32.511D CLOSED FRACTURE OF SUPERIOR RAMUS OF RIGHT PUBIS WITH ROUTINE HEALING, SUBSEQUENT ENCOUNTER: ICD-10-CM

## 2024-01-17 DIAGNOSIS — M25.552 HIP PAIN, LEFT: ICD-10-CM

## 2024-01-17 DIAGNOSIS — S32.511D CLOSED FRACTURE OF SUPERIOR RAMUS OF RIGHT PUBIS WITH ROUTINE HEALING, SUBSEQUENT ENCOUNTER: Primary | ICD-10-CM

## 2024-01-17 DIAGNOSIS — M62.81 MUSCLE WEAKNESS OF LOWER EXTREMITY: ICD-10-CM

## 2024-01-17 DIAGNOSIS — S72.002A CLOSED FRACTURE OF NECK OF LEFT FEMUR, INITIAL ENCOUNTER (H): ICD-10-CM

## 2024-01-17 DIAGNOSIS — Z87.81 STATUS POST-OPERATIVE REPAIR OF CLOSED FRACTURE OF LEFT HIP: Primary | ICD-10-CM

## 2024-01-17 DIAGNOSIS — Z98.890 STATUS POST-OPERATIVE REPAIR OF CLOSED FRACTURE OF LEFT HIP: ICD-10-CM

## 2024-01-17 DIAGNOSIS — Z87.81 STATUS POST-OPERATIVE REPAIR OF CLOSED FRACTURE OF LEFT HIP: ICD-10-CM

## 2024-01-17 DIAGNOSIS — S32.591D CLOSED FRACTURE OF RIGHT INFERIOR PUBIC RAMUS WITH ROUTINE HEALING, SUBSEQUENT ENCOUNTER: ICD-10-CM

## 2024-01-17 PROCEDURE — 97162 PT EVAL MOD COMPLEX 30 MIN: CPT | Mod: GP

## 2024-01-17 PROCEDURE — 97116 GAIT TRAINING THERAPY: CPT | Mod: GP

## 2024-01-18 PROBLEM — Z98.890 STATUS POST-OPERATIVE REPAIR OF CLOSED FRACTURE OF LEFT HIP: Status: ACTIVE | Noted: 2024-01-18

## 2024-01-18 PROBLEM — S32.511D CLOSED FRACTURE OF SUPERIOR RAMUS OF RIGHT PUBIS WITH ROUTINE HEALING, SUBSEQUENT ENCOUNTER: Status: ACTIVE | Noted: 2024-01-18

## 2024-01-18 PROBLEM — Z87.81 STATUS POST-OPERATIVE REPAIR OF CLOSED FRACTURE OF LEFT HIP: Status: ACTIVE | Noted: 2024-01-18

## 2024-01-18 PROBLEM — S72.002A CLOSED FRACTURE OF NECK OF LEFT FEMUR, INITIAL ENCOUNTER (H): Status: ACTIVE | Noted: 2024-01-18

## 2024-01-18 NOTE — PROGRESS NOTES
PHYSICAL THERAPY EVALUATION  Type of Visit: Evaluation    See electronic medical record for Abuse and Falls Screening details.    Subjective patient is a 48-year-old single female referred following a left hip ORIF performed on 12/28 or 12/29/2023.  Patient unfortunately had a previous fall on 1118 tripping at [employer] sustaining right pelvic fractures and utilized a front wheeled walker for ambulation.  She was going to be starting physical therapy however unfortunately tripped on a table at home and fell fracturing the left proximal femur and did have a ORIF with gamma nail performed.  Per referral patient is to be 25% partial weightbearing of the left lower extremity.  Patient does report to me after asking about fall history that she did have a fall last week again the day after she had followed up with orthopedics.  She endorses increased pain as well as redness at the mid incision at the lateral hip on the left.  Patient was advised to contact orthopedics after examining the left hip with pinpoint redness at the incision with significant pain to palpation.        Presenting condition or subjective complaint:  Left hip pain with lower extremity muscle weakness with recent history of left hip ORIF with gamma nail, recent history of right-sided pubic rami fractures limiting ADLs and gait as well as patient having continuous pain.  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  Bruising at the left foot  GAIT:   Weightbearing Status:  25% partial weightbearing , patient was instructed to push through in the arms with the partial weightbearing of left lower extremity, has difficult time maintaining proper weightbearing  Assistive Device(s): Walker (front wheeled)  Gait Deviations: Antalgic  Difficulty maintaining weightbearing status, complaints of right shoulder pain  ROM:  Patient is noted to flex the hip to point at the foot, with hip flexion past 90 degrees while in sitting.  PALPATION:  Pain at the posterior hip joint and directly surrounding the middle incision  OBSERVATION: Thin female with decreased muscle mass      Assessment & Plan   CLINICAL IMPRESSIONS  Medical Diagnosis: Status post-operative repair of closed fracture of left hip, Closed fracture of superior ramus of right pubis with routine healing, subsequent encounter    Treatment Diagnosis: Status post ORIF left hip with gamma nail, left hip pain, muscle weakness of the lower extremity.  History of inferior and superior pubic rami right sided fractures   Impression/Assessment: Patient is a 48 year old female with left hip pain complaints with recent fracture and ORIF of the left hip, with unfortunately increased pain following a fall last week.  Patient is advised to contact the orthopedics department for possible appointment in the next several days.  The following significant findings have been identified: Pain, Decreased ROM/flexibility, Decreased strength, Impaired balance, and Impaired gait. These impairments interfere with their ability to perform self care tasks, recreational activities,  household chores, household mobility, and community mobility as compared to previous level of function.     Clinical Decision Making (Complexity):  Clinical Presentation: Stable/Uncomplicated  Clinical Presentation Rationale: based on medical and personal factors listed in PT evaluation  Clinical Decision Making (Complexity): Moderate complexity    PLAN OF CARE  Treatment Interventions:  Interventions: Gait Training, Manual Therapy, Neuromuscular Re-education, Therapeutic Exercise    Long Term Goals     PT Goal 1  Goal Identifier: Gait  Goal Description: Pt will demonstrate safe gait mechanics with FWW  with PWB 25% to allow for proper healing of hip.  Target Date: 02/15/24  PT Goal 2  Goal Identifier: Gait  Goal Description: Pt will progress to increased WB with FWW to enhance strength and safe mobility.  Target Date: 02/29/24      Frequency of Treatment: 1-2x/ wk  Duration of Treatment: 6-8 weeks    Education Assessment:   Learner/Method: Patient;Listening    Risks and benefits of evaluation/treatment have been explained.   Patient/Family/caregiver agrees with Plan of Care.     Evaluation Time:     PT Eval, Moderate Complexity Minutes (21990): 25       Signing Clinician: Evelyne Elliott PT

## 2024-01-19 ENCOUNTER — THERAPY VISIT (OUTPATIENT)
Dept: PHYSICAL THERAPY | Facility: OTHER | Age: 49
End: 2024-01-19
Attending: FAMILY MEDICINE
Payer: OTHER GOVERNMENT

## 2024-01-19 DIAGNOSIS — S32.511D CLOSED FRACTURE OF SUPERIOR RAMUS OF RIGHT PUBIS WITH ROUTINE HEALING, SUBSEQUENT ENCOUNTER: ICD-10-CM

## 2024-01-19 DIAGNOSIS — S72.002A CLOSED FRACTURE OF NECK OF LEFT FEMUR, INITIAL ENCOUNTER (H): ICD-10-CM

## 2024-01-19 DIAGNOSIS — Z98.890 STATUS POST-OPERATIVE REPAIR OF CLOSED FRACTURE OF LEFT HIP: Primary | ICD-10-CM

## 2024-01-19 DIAGNOSIS — M25.552 HIP PAIN, LEFT: ICD-10-CM

## 2024-01-19 DIAGNOSIS — Z87.81 STATUS POST-OPERATIVE REPAIR OF CLOSED FRACTURE OF LEFT HIP: Primary | ICD-10-CM

## 2024-01-19 DIAGNOSIS — Z87.81 S/P LEFT HIP FRACTURE: ICD-10-CM

## 2024-01-19 PROCEDURE — 97116 GAIT TRAINING THERAPY: CPT | Mod: GP

## 2024-01-22 ENCOUNTER — THERAPY VISIT (OUTPATIENT)
Dept: PHYSICAL THERAPY | Facility: OTHER | Age: 49
End: 2024-01-22
Attending: FAMILY MEDICINE
Payer: OTHER GOVERNMENT

## 2024-01-22 DIAGNOSIS — M25.552 HIP PAIN, LEFT: ICD-10-CM

## 2024-01-22 DIAGNOSIS — Z98.890 STATUS POST-OPERATIVE REPAIR OF CLOSED FRACTURE OF LEFT HIP: Primary | ICD-10-CM

## 2024-01-22 DIAGNOSIS — S32.511D CLOSED FRACTURE OF SUPERIOR RAMUS OF RIGHT PUBIS WITH ROUTINE HEALING, SUBSEQUENT ENCOUNTER: ICD-10-CM

## 2024-01-22 DIAGNOSIS — S72.002A CLOSED FRACTURE OF NECK OF LEFT FEMUR, INITIAL ENCOUNTER (H): ICD-10-CM

## 2024-01-22 DIAGNOSIS — Z87.81 STATUS POST-OPERATIVE REPAIR OF CLOSED FRACTURE OF LEFT HIP: Primary | ICD-10-CM

## 2024-01-22 DIAGNOSIS — M62.81 MUSCLE WEAKNESS OF LOWER EXTREMITY: ICD-10-CM

## 2024-01-22 PROCEDURE — 97116 GAIT TRAINING THERAPY: CPT | Mod: GP

## 2024-01-30 ENCOUNTER — THERAPY VISIT (OUTPATIENT)
Dept: PHYSICAL THERAPY | Facility: OTHER | Age: 49
End: 2024-01-30
Attending: FAMILY MEDICINE
Payer: OTHER GOVERNMENT

## 2024-01-30 DIAGNOSIS — Z98.890 STATUS POST-OPERATIVE REPAIR OF CLOSED FRACTURE OF LEFT HIP: Primary | ICD-10-CM

## 2024-01-30 DIAGNOSIS — S72.002A CLOSED FRACTURE OF NECK OF LEFT FEMUR, INITIAL ENCOUNTER (H): ICD-10-CM

## 2024-01-30 DIAGNOSIS — S32.511D CLOSED FRACTURE OF SUPERIOR RAMUS OF RIGHT PUBIS WITH ROUTINE HEALING, SUBSEQUENT ENCOUNTER: ICD-10-CM

## 2024-01-30 DIAGNOSIS — Z87.81 STATUS POST-OPERATIVE REPAIR OF CLOSED FRACTURE OF LEFT HIP: Primary | ICD-10-CM

## 2024-01-30 PROCEDURE — 97116 GAIT TRAINING THERAPY: CPT | Mod: GP,CQ

## 2024-02-04 ENCOUNTER — HOSPITAL ENCOUNTER (EMERGENCY)
Facility: OTHER | Age: 49
Discharge: HOME | End: 2024-02-04
Attending: FAMILY MEDICINE | Admitting: FAMILY MEDICINE
Payer: OTHER GOVERNMENT

## 2024-02-04 ENCOUNTER — APPOINTMENT (OUTPATIENT)
Dept: GENERAL RADIOLOGY | Facility: OTHER | Age: 49
End: 2024-02-04
Attending: FAMILY MEDICINE
Payer: OTHER GOVERNMENT

## 2024-02-04 ENCOUNTER — APPOINTMENT (OUTPATIENT)
Dept: CT IMAGING | Facility: OTHER | Age: 49
End: 2024-02-04
Attending: FAMILY MEDICINE
Payer: OTHER GOVERNMENT

## 2024-02-04 VITALS
RESPIRATION RATE: 14 BRPM | HEART RATE: 75 BPM | SYSTOLIC BLOOD PRESSURE: 95 MMHG | TEMPERATURE: 98.7 F | OXYGEN SATURATION: 100 % | WEIGHT: 90 LBS | BODY MASS INDEX: 15.36 KG/M2 | HEIGHT: 64 IN | DIASTOLIC BLOOD PRESSURE: 71 MMHG
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
## 2024-02-04 PROCEDURE — 99284 EMERGENCY DEPT VISIT MOD MDM: CPT | Performed by: FAMILY MEDICINE

## 2024-02-04 PROCEDURE — 96374 THER/PROPH/DIAG INJ IV PUSH: CPT | Performed by: FAMILY MEDICINE

## 2024-02-04 PROCEDURE — 250N000011 HC RX IP 250 OP 636: Performed by: FAMILY MEDICINE

## 2024-02-04 PROCEDURE — 72170 X-RAY EXAM OF PELVIS: CPT | Mod: TC

## 2024-02-04 PROCEDURE — 72192 CT PELVIS W/O DYE: CPT | Mod: TC

## 2024-02-04 PROCEDURE — 99285 EMERGENCY DEPT VISIT HI MDM: CPT | Mod: 25 | Performed by: FAMILY MEDICINE

## 2024-02-04 PROCEDURE — 72100 X-RAY EXAM L-S SPINE 2/3 VWS: CPT | Mod: TC

## 2024-02-04 RX ORDER — KETOROLAC TROMETHAMINE 10 MG/1
10 TABLET, FILM COATED ORAL EVERY 6 HOURS PRN
Qty: 20 TABLET | Refills: 0 | Status: SHIPPED | OUTPATIENT
Start: 2024-02-04 | End: 2024-02-23

## 2024-02-04 RX ORDER — KETOROLAC TROMETHAMINE 15 MG/ML
15 INJECTION, SOLUTION INTRAMUSCULAR; INTRAVENOUS ONCE
Status: COMPLETED | OUTPATIENT
Start: 2024-02-04 | End: 2024-02-04

## 2024-02-04 RX ADMIN — KETOROLAC TROMETHAMINE 15 MG: 15 INJECTION, SOLUTION INTRAMUSCULAR; INTRAVENOUS at 07:24

## 2024-02-04 ASSESSMENT — ACTIVITIES OF DAILY LIVING (ADL)
ADLS_ACUITY_SCORE: 35
ADLS_ACUITY_SCORE: 35

## 2024-02-04 ASSESSMENT — ENCOUNTER SYMPTOMS: BACK PAIN: 1

## 2024-02-04 NOTE — ED PROVIDER NOTES
"  History     Chief Complaint   Patient presents with    Fall    Hip Pain     The history is provided by the patient and medical records.     Sara Liriano is a 48 year old female here by EMS.  She fell last night at her mom's home. She had a left CARLOTA in December at [facility] so she uses a walker. She turned around, slipped and fell at about 10 PM last night (9 hours ago).  She was able to get up and walk to the couch but this morning could not get up. She has pain on the right side of her low back. Her pain is 7 or 8 of 10 and does not want narcotics.     She is not on blood thinners. No ibuprofen since 8 PM last night.  She has seizures (on Lamictal and Vimpat), osteoarthritis with intertrochanteric fracture of L hip Dec 28 and healing right superior and inferior pubic rami fractures noted on CT at that time, first seen Nov 8, 2023 on CT.    Allergies:  Allergies   Allergen Reactions    Fluoxetine Other (See Comments) and Swelling     Blurred vision, heart racing, face swelling    Amoxicillin Other (See Comments)     \"Thrush\"       Problem List:    Patient Active Problem List    Diagnosis Date Noted    Status post-operative repair of closed fracture of left hip 01/18/2024     Priority: Medium    Closed fracture of neck of left femur, initial encounter (H) 01/18/2024     Priority: Medium    Closed fracture of superior ramus of right pubis with routine healing, subsequent encounter 01/18/2024     Priority: Medium    Anorexia 11/29/2023     Priority: Medium    Seizure disorder (H) 11/08/2023     Priority: Medium    Bony sclerosis 11/08/2023     Priority: Medium    Primary osteoarthritis of right hip 11/08/2023     Priority: Medium    Contusion of right hip, initial encounter 11/08/2023     Priority: Medium    Fall from slip, trip, or stumble, initial encounter 11/08/2023     Priority: Medium    Shoulder injury, right, initial encounter 11/08/2023     Priority: Medium    Inner ear dysfunction, left 11/08/2023     " Priority: Medium    Closed nondisplaced fracture of pelvis, unspecified part of pelvis, initial encounter (H) 11/08/2023     Priority: Medium    Arthritis of right glenohumeral joint 11/08/2023     Priority: Medium        Past Medical History:    Past Medical History:   Diagnosis Date    Anorexia     Atypical depression     Hypothyroidism     Intracranial hemorrhage (H) 2018    Osteopenia     PTSD (post-traumatic stress disorder)     Seizure disorder (H)     TBI (traumatic brain injury) (H) 05/2018       Past Surgical History:    Past Surgical History:   Procedure Laterality Date    CRANIOPLASTY  08/08/2018    CRANIOTOMY  05/2018    TRACHEOSTOMY  06/2018       Family History:    Family History   Problem Relation Age of Onset    Hypertension Mother     Heart Failure Father     Diabetes Father     Hypertension Sister        Social History:  Marital Status:  Single [1]  Social History     Tobacco Use    Smoking status: Never     Passive exposure: Never    Smokeless tobacco: Never   Vaping Use    Vaping Use: Never used   Substance Use Topics    Alcohol use: Yes     Comment: Alcoholic Drinks/day: Occassional    Drug use: No     Comment: Drug use: No        Medications:    ketorolac (TORADOL) 10 MG tablet  acetaminophen (TYLENOL) 500 MG tablet  calcium carbonate (OS-MOISES) 500 MG tablet  cyanocobalamin (VITAMIN B-12) 1000 MCG tablet  docusate sodium (COLACE) 100 MG capsule  fluticasone (FLONASE) 50 MCG/ACT nasal spray  Folic Acid-Vit B6-Vit B12 (FOLBEE) 2.5-25-1 MG TABS  hydrOXYzine (VISTARIL) 25 MG capsule  IBANdronate (BONIVA) 150 MG tablet  ibuprofen (ADVIL/MOTRIN) 200 MG tablet  Lacosamide (VIMPAT) 100 MG TABS tablet  lamoTRIgine (LAMICTAL) 200 MG tablet  levothyroxine (SYNTHROID/LEVOTHROID) 50 MCG tablet  magnesium hydroxide (MILK OF MAGNESIA) 400 MG/5ML suspension  melatonin 3 MG tablet  multivitamin, therapeutic (THERA-VIT) TABS tablet  neomycin-polymyxin-dexAMETHasone (MAXITROL) 3.5-54530-9.1 SUSP ophthalmic  "susp  olopatadine (PATANOL) 0.1 % ophthalmic solution  omeprazole (PRILOSEC OTC) 20 MG EC tablet  oxyCODONE (ROXICODONE) 5 MG tablet  polyethylene glycol (MIRALAX) 17 GM/Dose powder  potassium chloride ER (KLOR-CON M) 20 MEQ CR tablet  sodium chloride (OCEAN) 0.65 % nasal spray  thiamine (B-1) 100 MG tablet  vitamin D3 (CHOLECALCIFEROL) 50 mcg (2000 units) tablet          Review of Systems   Musculoskeletal:  Positive for back pain and gait problem.   All other systems reviewed and are negative.    Physical Exam   BP: 105/78  Pulse: 83  Temp: 98.7  F (37.1  C)  Resp: 14  Height: 162.6 cm (5' 4\")  Weight: 40.8 kg (90 lb)  SpO2: 100 %      Physical Exam  Vitals and nursing note reviewed.   Cardiovascular:      Rate and Rhythm: Normal rate.      Pulses: Normal pulses.   Pulmonary:      Effort: Pulmonary effort is normal.      Breath sounds: Normal breath sounds.   Musculoskeletal:      Comments: She has right sided low back pain and cannot lift the leg off the bed more than about 2 inches. No pubic rami tenderness.   Skin:     General: Skin is warm and dry.   Neurological:      Mental Status: She is alert.         Results for orders placed or performed during the hospital encounter of 02/04/24 (from the past 24 hour(s))   XR Lumbar Spine 2/3 Views    Narrative    PROCEDURE INFORMATION:   Exam: XR Lumbosacral Spine   Exam date and time: 2/4/2024 7:49 AM   Age: 48 years old   Clinical indication: Injury or trauma; Fall; Blunt trauma (contusions or   hematomas); Additional info: Fall with right sided low back pain     TECHNIQUE:   Imaging protocol: Radiologic exam of the lumbosacral spine.   Views: 2 or 3 views.     COMPARISON:   No relevant priors.     FINDINGS:   Bones/joints: Old superior endplate fractures L3 and L4. The remaining   vertebral body heights are maintained. No acute fractures. Hypertrophy and   degeneration of the lower facet joints. Normal alignment. The disc spaces are   maintained. Small marginal " endplate osteophytes at multiple levels. Old right   pubic bone fractures.   Soft tissues: Unremarkable.       Impression    IMPRESSION:   1.   Old endplate fractures.   2.   No acute fractures or dislocations.   3.   Mild degenerative disc disease.   4.   Facet joint arthropathy.     THIS DOCUMENT HAS BEEN ELECTRONICALLY SIGNED BY TAMAR ARREOLA MD   XR Pelvis 1/2 Views    Narrative    PROCEDURE INFORMATION:   Exam: XR Pelvis   Exam date and time: 2/4/2024 7:47 AM   Age: 48 years old   Clinical indication: Injury or trauma; Fall; Blunt trauma (contusions or   hematomas); Bilateral; Pelvic region; Additional info: Fall with right sided   pelvic pain     TECHNIQUE:   Imaging protocol: Radiologic exam of the pelvis.   Views: 1 or 2 view.     COMPARISON:   CR XR PELVIS 1/2 VIEWS 1/4/2024 1:32 PM     FINDINGS:   Bones/joints: There are stable postsurgical changes following internal fixation   of a comminuted intertrochanteric fracture of the proximal left femur. There   are chronic fractures of the right pubic bone and inferior right pubic ramus.   No acute fracture is identified. There are chronic focal densities within the   right iliac bone which may represent bone islands. The sacroiliac joints are   normal in width. There is slight narrowing of the right hip joint space.   Soft tissues: Unremarkable.       Impression    IMPRESSION:   1.   Stable findings, as above.   2.   No acute finding is identified.     THIS DOCUMENT HAS BEEN ELECTRONICALLY SIGNED BY STEFFI AGUILAR MD   CT Pelvis Bone wo Contrast    Narrative    PROCEDURE INFORMATION:   Exam: CT Pelvis Without Contrast; Skeletal   Exam date and time: 2/4/2024 8:16 AM   Age: 48 years old   Clinical indication: Injury or trauma; Fall; Blunt trauma (contusions or   hematomas); Bilateral; Pelvic region; Additional info: Prior right s and i   pubic rami fracture, fell last night and has right sided pelvic pain     TECHNIQUE:   Imaging protocol: Computed tomography of  the pelvis without contrast. Exam   focused on the skeleton.   Radiation optimization: All CT scans at this facility use at least one of these   dose optimization techniques: automated exposure control; mA and/or kV   adjustment per patient size (includes targeted exams where dose is matched to   clinical indication); or iterative reconstruction.     COMPARISON:   CT HIP LEFT W/O CONTRAST 12/28/2023 1:34 AM     FINDINGS:   Bones/joints: There has been internal fixation of a comminuted   intertrochanteric fracture of the proximal left femur. There are chronic   fractures of the right inferior pubic ramus and right pubic bone. There are   chronic fractures of the sacrum and right sacral ala. There is a probable   chronic compression fracture of L4, not well visualized on this study. No acute   fracture is identified. There are stable sclerotic foci within the right iliac   bone, nonspecific but possibly representing bone islands.   Soft tissues: Unremarkable.       Impression    IMPRESSION:   1.   There has been internal fixation of a comminuted intertrochanteric   fracture of the proximal left femur.   2.   Multiple chronic pelvic and sacral fractures, as above.   3.   No definite acute finding is identified.     THIS DOCUMENT HAS BEEN ELECTRONICALLY SIGNED BY STEFFI AGUILAR MD       Medications   ketorolac (TORADOL) injection 15 mg (15 mg Intravenous $Given 2/4/24 1390)       Assessments & Plan (with Medical Decision Making)  Sara Liriano is a 48 year old female here by EMS.  She fell last night at her mom's home. She had a left CARLOTA in December at [facility] so she uses a walker. She turned around, slipped and fell at about 10 PM last night (9 hours ago).  She was able to get up and walk to the couch but this morning could not get up. She has pain on the right side of her low back. Her pain is 7 or 8 of 10 and does not want narcotics.   She is not on blood thinners. No ibuprofen since 8 PM last night.  She has  "seizures (on Lamictal and Vimpat), osteoarthritis with intertrochanteric fracture of L hip Dec 28 and healing right superior and inferior pubic rami fractures noted on CT at that time, first seen Nov 8, 2023 on CT.  VS in the ED BP 95/71   Pulse 75   Temp 98.7  F (37.1  C) (Tympanic)   Resp 14   Ht 1.626 m (5' 4\")   Wt 40.8 kg (90 lb)   LMP 01/01/2013 (Within Years)   SpO2 100%   BMI 15.45 kg/m    Exam shows right low back pain and she cannot lift her left leg off the bed more than about 2 inches. She is able to lift the right leg off the bed.  We gave Toradol.   Xray of the L spine negative  Xray of the pelvis negative  CT pelvis negative.  9:56 AM  We did get her up and she did well. I will get her some Toradol for home.      I have reviewed the nursing notes.    I have reviewed the findings, diagnosis, plan and need for follow up with the patient.  Medical Decision Making  The patient's presentation was of low complexity (an acute and uncomplicated illness or injury).    The patient's evaluation involved:  an assessment requiring an independent historian (see separate area of note for details)  ordering and/or review of 3+ test(s) in this encounter (see separate area of note for details)    The patient's management necessitated moderate risk (prescription drug management including medications given in the ED).        New Prescriptions    KETOROLAC (TORADOL) 10 MG TABLET    Take 1 tablet (10 mg) by mouth every 6 hours as needed for moderate pain       Final diagnoses:   Accident due to mechanical fall without injury, initial encounter       2/4/2024   [facility], Som Hilton MD  02/04/24 0957    "

## 2024-02-04 NOTE — DISCHARGE INSTRUCTIONS
Sara    I am glad you are doing well.      You can take the Toradol every six hours for pain.     Thank you for choosing our Emergency Department for your care.     You may receive a phone call or letter for a survey about your care in our ED.  Please complete this as this is how we improve care for our patients.     If you have any questions after leaving the ED you can call or text me on my cell phone at [phone].  This does not mean that I am on call, but I will get back to you.  If you are not doing well please return to the ED.     Sincerely,    Dr Bakari Su M.D.

## 2024-02-04 NOTE — ED NOTES
Road tested patient down gross and back using front wheel walker, which pt uses at home, pt tolerated well.

## 2024-02-04 NOTE — ED TRIAGE NOTES
Pt here via [facility] from home.  Pt states she fell when getting up last night at 2300, hurting her left hip.  Pt denies LOC or hitting her head and is not on blood thinners.  Pt had a recent left hip replacement and is concerned about this.  Pt rates her pain 8/10 and there does not appear to be any shortening on arrival.     Triage Assessment (Adult)       Row Name 02/04/24 0642          Triage Assessment    Airway WDL WDL        Respiratory WDL    Respiratory WDL WDL        Skin Circulation/Temperature WDL    Skin Circulation/Temperature WDL WDL        Cardiac WDL    Cardiac WDL WDL        Peripheral/Neurovascular WDL    Peripheral Neurovascular WDL WDL        Cognitive/Neuro/Behavioral WDL    Cognitive/Neuro/Behavioral WDL WDL

## 2024-02-05 ENCOUNTER — THERAPY VISIT (OUTPATIENT)
Dept: PHYSICAL THERAPY | Facility: OTHER | Age: 49
End: 2024-02-05
Attending: FAMILY MEDICINE
Payer: OTHER GOVERNMENT

## 2024-02-05 DIAGNOSIS — S72.002A CLOSED FRACTURE OF NECK OF LEFT FEMUR, INITIAL ENCOUNTER (H): ICD-10-CM

## 2024-02-05 DIAGNOSIS — M25.552 HIP PAIN, LEFT: ICD-10-CM

## 2024-02-05 DIAGNOSIS — Z87.81 STATUS POST-OPERATIVE REPAIR OF CLOSED FRACTURE OF LEFT HIP: Primary | ICD-10-CM

## 2024-02-05 DIAGNOSIS — Z98.890 STATUS POST-OPERATIVE REPAIR OF CLOSED FRACTURE OF LEFT HIP: Primary | ICD-10-CM

## 2024-02-05 DIAGNOSIS — M62.81 MUSCLE WEAKNESS OF LOWER EXTREMITY: ICD-10-CM

## 2024-02-05 DIAGNOSIS — S32.511D CLOSED FRACTURE OF SUPERIOR RAMUS OF RIGHT PUBIS WITH ROUTINE HEALING, SUBSEQUENT ENCOUNTER: ICD-10-CM

## 2024-02-05 PROCEDURE — 97116 GAIT TRAINING THERAPY: CPT | Mod: GP

## 2024-02-05 NOTE — PROGRESS NOTES
02/05/24 0500   Appointment Info   Signing clinician's name / credentials Evelyne Elliott DPT   Total/Authorized Visits 5   Visits Used 5/10   Medical Diagnosis Status post-operative repair of closed fracture of left hip, Closed fracture of superior ramus of right pubis with routine healing, subsequent encounter   PT Tx Diagnosis Status post ORIF left hip with gamma nail, left hip pain, muscle weakness of the lower extremity.  History of inferior and superior pubic rami right sided fractures   Precautions/Limitations 25% PWB LLE (per referral 1/2/24), fall risk   Other pertinent information Fell 11/8/23 (R pelvic fx), Fell 12/28/23   Progress Note/Certification   Start of Care Date 01/17/24   Onset of illness/injury or Date of Surgery 12/28/23   Therapy Frequency 1-2x/ wk   Predicted Duration 6-8 weeks   Supervision   PT Assistant Visit Number 1   PT Goal 1   Goal Identifier Gait   Goal Description Pt will demonstrate safe gait mechanics with FWW  with PWB 25% to allow for proper healing of hip.   Target Date 02/15/24   PT Goal 2   Goal Identifier Gait   Goal Description Pt will progress to increased WB with FWW to enhance strength and safe mobility.   Target Date 02/29/24   Subjective Report   Subjective Report Returs ORIF L hip, unfortunately had a fall 2/3/24 night,  the walker leg caught with turning and fell landed on right side, went by EMS to ED the following day. No new fractures, L ORIF stable. C/o pain primarily on R hip and buttock to the low back.  In regards to L hip was doing good before fall, was doing exercise and felt ok.   Gait Training   Gait Training Minutes, includes stair climbing (50997) 30   Gait 1 - Details Instruction of 25% partial weightbearing with FWW, with observing patient she is unable to maintain the WB status, she wants to know if she should do walking for exercise. Advised against this since she is unable to maintain the 25% WB on the L. She is instructed to accomodate  activity based on pain. Pain at left hip 4/10. R LBP/hip 8/10. Patient would like to try exercise today. Seated: glute sets x 10 (VC to avoid substitution) LAQ L x 5.  Independent sit to supine, c/o pain at R low back. Performs heel slides x10 with L LE, needs continuous VC to try and maintain midline movement of lower extremity. Quad set perfromed x5, then held due to c/o pain at L hip. Exercise was held at this point and explained to patient that she is not tolerating exercise and will need to hold on furher today. Again reiterated imprortance of modifying activity based on pain level and physician WB limits.   Skilled Intervention Instruction of gait mechanics and weightbearing status, instruction and rationale behind exercise and WB status   Patient Response/Progress Unable to tolerate physical therapy secondary to new right hip and LBP after fall, also pain at Left hip is limiting. Patient is unable to maintain prescribed WB of 25% on the left LE, which has been exacerbated due to pain now on R side. Patient needs repeated instruction on appropriate activity at current level.   Education   Learner/Method Patient;Listening   Plan   Home program Gluteal sets, quad sets   Plan for next session Follow up after ortho appointment next week, pt to continue with HEP as tolerated, continue WB restrictions.   Total Session Time   Timed Code Treatment Minutes 30   Total Treatment Time (sum of timed and untimed services) 30         PLAN  Continue therapy per current plan of care.    Beginning/End Dates of Progress Note Reporting Period:    to 02/05/2024    Referring Provider:  Alton Lyons

## 2024-02-07 ENCOUNTER — HOSPITAL ENCOUNTER (EMERGENCY)
Facility: OTHER | Age: 49
Discharge: HOME | End: 2024-02-07
Attending: FAMILY MEDICINE | Admitting: FAMILY MEDICINE
Payer: OTHER GOVERNMENT

## 2024-02-07 ENCOUNTER — HOSPITAL ENCOUNTER (EMERGENCY)
Facility: OTHER | Age: 49
Discharge: HOME | End: 2024-02-08
Attending: STUDENT IN AN ORGANIZED HEALTH CARE EDUCATION/TRAINING PROGRAM | Admitting: STUDENT IN AN ORGANIZED HEALTH CARE EDUCATION/TRAINING PROGRAM
Payer: OTHER GOVERNMENT

## 2024-02-07 VITALS
OXYGEN SATURATION: 93 % | SYSTOLIC BLOOD PRESSURE: 111 MMHG | HEIGHT: 64 IN | BODY MASS INDEX: 15.36 KG/M2 | DIASTOLIC BLOOD PRESSURE: 82 MMHG | WEIGHT: 90 LBS | RESPIRATION RATE: 11 BRPM | HEART RATE: 87 BPM | TEMPERATURE: 97.5 F

## 2024-02-07 VITALS
TEMPERATURE: 97.2 F | HEART RATE: 89 BPM | DIASTOLIC BLOOD PRESSURE: 80 MMHG | BODY MASS INDEX: 15.36 KG/M2 | SYSTOLIC BLOOD PRESSURE: 113 MMHG | WEIGHT: 90 LBS | HEIGHT: 64 IN | RESPIRATION RATE: 22 BRPM | OXYGEN SATURATION: 96 %

## 2024-02-07 DIAGNOSIS — R11.2 NAUSEA AND VOMITING, UNSPECIFIED VOMITING TYPE: ICD-10-CM

## 2024-02-07 DIAGNOSIS — S32.511D CLOSED FRACTURE OF SUPERIOR RAMUS OF RIGHT PUBIS WITH ROUTINE HEALING, SUBSEQUENT ENCOUNTER: ICD-10-CM

## 2024-02-07 DIAGNOSIS — R29.6 FALLS FREQUENTLY: ICD-10-CM

## 2024-02-07 DIAGNOSIS — R63.0 ANOREXIA: ICD-10-CM

## 2024-02-07 DIAGNOSIS — S09.90XA TRAUMATIC INJURY OF HEAD, INITIAL ENCOUNTER: ICD-10-CM

## 2024-02-07 DIAGNOSIS — S72.002A CLOSED FRACTURE OF NECK OF LEFT FEMUR, INITIAL ENCOUNTER (H): ICD-10-CM

## 2024-02-07 DIAGNOSIS — R53.1 GENERALIZED WEAKNESS: ICD-10-CM

## 2024-02-07 LAB
ALBUMIN SERPL BCG-MCNC: 3.3 G/DL (ref 3.5–5.2)
ALBUMIN SERPL BCG-MCNC: 3.4 G/DL (ref 3.5–5.2)
ALP SERPL-CCNC: 302 U/L (ref 40–150)
ALP SERPL-CCNC: 306 U/L (ref 40–150)
ALT SERPL W P-5'-P-CCNC: 25 U/L (ref 0–50)
ALT SERPL W P-5'-P-CCNC: 25 U/L (ref 0–50)
ANION GAP SERPL CALCULATED.3IONS-SCNC: 12 MMOL/L (ref 7–15)
ANION GAP SERPL CALCULATED.3IONS-SCNC: 13 MMOL/L (ref 7–15)
AST SERPL W P-5'-P-CCNC: 30 U/L (ref 0–45)
AST SERPL W P-5'-P-CCNC: 31 U/L (ref 0–45)
BASOPHILS # BLD AUTO: 0 10E3/UL (ref 0–0.2)
BASOPHILS # BLD AUTO: 0.1 10E3/UL (ref 0–0.2)
BASOPHILS NFR BLD AUTO: 1 %
BASOPHILS NFR BLD AUTO: 1 %
BILIRUB SERPL-MCNC: 0.3 MG/DL
BILIRUB SERPL-MCNC: 0.3 MG/DL
BUN SERPL-MCNC: 13.9 MG/DL (ref 6–20)
BUN SERPL-MCNC: 14.2 MG/DL (ref 6–20)
CALCIUM SERPL-MCNC: 7.9 MG/DL (ref 8.6–10)
CALCIUM SERPL-MCNC: 8 MG/DL (ref 8.6–10)
CHLORIDE SERPL-SCNC: 94 MMOL/L (ref 98–107)
CHLORIDE SERPL-SCNC: 97 MMOL/L (ref 98–107)
CREAT SERPL-MCNC: 0.72 MG/DL (ref 0.51–0.95)
CREAT SERPL-MCNC: 0.82 MG/DL (ref 0.51–0.95)
DEPRECATED HCO3 PLAS-SCNC: 26 MMOL/L (ref 22–29)
DEPRECATED HCO3 PLAS-SCNC: 28 MMOL/L (ref 22–29)
EGFRCR SERPLBLD CKD-EPI 2021: 87 ML/MIN/1.73M2
EGFRCR SERPLBLD CKD-EPI 2021: >90 ML/MIN/1.73M2
EOSINOPHIL # BLD AUTO: 0 10E3/UL (ref 0–0.7)
EOSINOPHIL # BLD AUTO: 0 10E3/UL (ref 0–0.7)
EOSINOPHIL NFR BLD AUTO: 0 %
EOSINOPHIL NFR BLD AUTO: 1 %
ERYTHROCYTE [DISTWIDTH] IN BLOOD BY AUTOMATED COUNT: 14 % (ref 10–15)
ERYTHROCYTE [DISTWIDTH] IN BLOOD BY AUTOMATED COUNT: 14.1 % (ref 10–15)
FLUAV RNA SPEC QL NAA+PROBE: NEGATIVE
FLUBV RNA RESP QL NAA+PROBE: NEGATIVE
GLUCOSE BLDC GLUCOMTR-MCNC: 68 MG/DL (ref 70–99)
GLUCOSE SERPL-MCNC: 75 MG/DL (ref 70–99)
GLUCOSE SERPL-MCNC: 98 MG/DL (ref 70–99)
HCT VFR BLD AUTO: 37.5 % (ref 35–47)
HCT VFR BLD AUTO: 39.2 % (ref 35–47)
HGB BLD-MCNC: 12.7 G/DL (ref 11.7–15.7)
HGB BLD-MCNC: 13.4 G/DL (ref 11.7–15.7)
HOLD SPECIMEN: NORMAL
IMM GRANULOCYTES # BLD: 0 10E3/UL
IMM GRANULOCYTES # BLD: 0 10E3/UL
IMM GRANULOCYTES NFR BLD: 0 %
IMM GRANULOCYTES NFR BLD: 0 %
LACTATE SERPL-SCNC: 0.9 MMOL/L (ref 0.7–2)
LACTATE SERPL-SCNC: 1 MMOL/L (ref 0.7–2)
LIPASE SERPL-CCNC: 9 U/L (ref 13–60)
LYMPHOCYTES # BLD AUTO: 1.6 10E3/UL (ref 0.8–5.3)
LYMPHOCYTES # BLD AUTO: 1.6 10E3/UL (ref 0.8–5.3)
LYMPHOCYTES NFR BLD AUTO: 23 %
LYMPHOCYTES NFR BLD AUTO: 26 %
MAGNESIUM SERPL-MCNC: 3.7 MG/DL (ref 1.7–2.3)
MCH RBC QN AUTO: 35.1 PG (ref 26.5–33)
MCH RBC QN AUTO: 35.2 PG (ref 26.5–33)
MCHC RBC AUTO-ENTMCNC: 33.9 G/DL (ref 31.5–36.5)
MCHC RBC AUTO-ENTMCNC: 34.2 G/DL (ref 31.5–36.5)
MCV RBC AUTO: 103 FL (ref 78–100)
MCV RBC AUTO: 104 FL (ref 78–100)
MONOCYTES # BLD AUTO: 0.4 10E3/UL (ref 0–1.3)
MONOCYTES # BLD AUTO: 0.4 10E3/UL (ref 0–1.3)
MONOCYTES NFR BLD AUTO: 5 %
MONOCYTES NFR BLD AUTO: 6 %
NEUTROPHILS # BLD AUTO: 4.1 10E3/UL (ref 1.6–8.3)
NEUTROPHILS # BLD AUTO: 4.8 10E3/UL (ref 1.6–8.3)
NEUTROPHILS NFR BLD AUTO: 66 %
NEUTROPHILS NFR BLD AUTO: 71 %
NRBC # BLD AUTO: 0 10E3/UL
NRBC # BLD AUTO: 0 10E3/UL
NRBC BLD AUTO-RTO: 0 /100
NRBC BLD AUTO-RTO: 0 /100
PLATELET # BLD AUTO: 333 10E3/UL (ref 150–450)
PLATELET # BLD AUTO: 347 10E3/UL (ref 150–450)
POTASSIUM SERPL-SCNC: 4 MMOL/L (ref 3.4–5.3)
POTASSIUM SERPL-SCNC: 4 MMOL/L (ref 3.4–5.3)
PROT SERPL-MCNC: 5.8 G/DL (ref 6.4–8.3)
PROT SERPL-MCNC: 6.1 G/DL (ref 6.4–8.3)
RBC # BLD AUTO: 3.61 10E6/UL (ref 3.8–5.2)
RBC # BLD AUTO: 3.82 10E6/UL (ref 3.8–5.2)
RSV RNA SPEC NAA+PROBE: NEGATIVE
SARS-COV-2 RNA RESP QL NAA+PROBE: NEGATIVE
SODIUM SERPL-SCNC: 133 MMOL/L (ref 135–145)
SODIUM SERPL-SCNC: 137 MMOL/L (ref 135–145)
WBC # BLD AUTO: 6.2 10E3/UL (ref 4–11)
WBC # BLD AUTO: 6.8 10E3/UL (ref 4–11)

## 2024-02-07 PROCEDURE — 82247 BILIRUBIN TOTAL: CPT | Mod: XU | Performed by: FAMILY MEDICINE

## 2024-02-07 PROCEDURE — 85025 COMPLETE CBC W/AUTO DIFF WBC: CPT | Performed by: STUDENT IN AN ORGANIZED HEALTH CARE EDUCATION/TRAINING PROGRAM

## 2024-02-07 PROCEDURE — 82962 GLUCOSE BLOOD TEST: CPT

## 2024-02-07 PROCEDURE — 36415 COLL VENOUS BLD VENIPUNCTURE: CPT | Performed by: STUDENT IN AN ORGANIZED HEALTH CARE EDUCATION/TRAINING PROGRAM

## 2024-02-07 PROCEDURE — 83605 ASSAY OF LACTIC ACID: CPT | Performed by: FAMILY MEDICINE

## 2024-02-07 PROCEDURE — 36415 COLL VENOUS BLD VENIPUNCTURE: CPT | Performed by: FAMILY MEDICINE

## 2024-02-07 PROCEDURE — 99284 EMERGENCY DEPT VISIT MOD MDM: CPT | Mod: 25 | Performed by: STUDENT IN AN ORGANIZED HEALTH CARE EDUCATION/TRAINING PROGRAM

## 2024-02-07 PROCEDURE — 84460 ALANINE AMINO (ALT) (SGPT): CPT | Performed by: STUDENT IN AN ORGANIZED HEALTH CARE EDUCATION/TRAINING PROGRAM

## 2024-02-07 PROCEDURE — 83690 ASSAY OF LIPASE: CPT | Performed by: FAMILY MEDICINE

## 2024-02-07 PROCEDURE — 99284 EMERGENCY DEPT VISIT MOD MDM: CPT | Performed by: STUDENT IN AN ORGANIZED HEALTH CARE EDUCATION/TRAINING PROGRAM

## 2024-02-07 PROCEDURE — 87637 SARSCOV2&INF A&B&RSV AMP PRB: CPT | Performed by: STUDENT IN AN ORGANIZED HEALTH CARE EDUCATION/TRAINING PROGRAM

## 2024-02-07 PROCEDURE — 96374 THER/PROPH/DIAG INJ IV PUSH: CPT | Performed by: STUDENT IN AN ORGANIZED HEALTH CARE EDUCATION/TRAINING PROGRAM

## 2024-02-07 PROCEDURE — 83605 ASSAY OF LACTIC ACID: CPT | Performed by: STUDENT IN AN ORGANIZED HEALTH CARE EDUCATION/TRAINING PROGRAM

## 2024-02-07 PROCEDURE — 99283 EMERGENCY DEPT VISIT LOW MDM: CPT | Performed by: FAMILY MEDICINE

## 2024-02-07 PROCEDURE — 84155 ASSAY OF PROTEIN SERUM: CPT | Performed by: STUDENT IN AN ORGANIZED HEALTH CARE EDUCATION/TRAINING PROGRAM

## 2024-02-07 PROCEDURE — 250N000011 HC RX IP 250 OP 636: Performed by: STUDENT IN AN ORGANIZED HEALTH CARE EDUCATION/TRAINING PROGRAM

## 2024-02-07 PROCEDURE — 83735 ASSAY OF MAGNESIUM: CPT | Performed by: FAMILY MEDICINE

## 2024-02-07 PROCEDURE — 85025 COMPLETE CBC W/AUTO DIFF WBC: CPT | Performed by: FAMILY MEDICINE

## 2024-02-07 RX ORDER — ONDANSETRON 2 MG/ML
4 INJECTION INTRAMUSCULAR; INTRAVENOUS ONCE
Status: COMPLETED | OUTPATIENT
Start: 2024-02-07 | End: 2024-02-07

## 2024-02-07 RX ADMIN — ONDANSETRON 4 MG: 2 INJECTION INTRAMUSCULAR; INTRAVENOUS at 21:52

## 2024-02-07 ASSESSMENT — ENCOUNTER SYMPTOMS
NAUSEA: 1
DIARRHEA: 1

## 2024-02-07 ASSESSMENT — ACTIVITIES OF DAILY LIVING (ADL)
ADLS_ACUITY_SCORE: 38

## 2024-02-07 NOTE — ED TRIAGE NOTES
Pt presents with her mother primarily slurred speech, has been having multiple health issues since about January and having some generalized decline, poor appetite (weight has been stable), malaise, fatigue. Last night went to bed about 2030, and this morning noting some slurred speech. BEFAST negative in triage. Reports dark stools and pronounced nausea as well. Pt brought to bay 1 for eval for possible stroke code.     /82   Pulse 88   Temp 97.5  F (36.4  C) (Tympanic)   Resp 18   Wt 40.8 kg (90 lb)   LMP 01/01/2013 (Within Years)   SpO2 98%   BMI 15.45 kg/m         Triage Assessment (Adult)       Row Name 02/07/24 0933          Triage Assessment    Airway WDL WDL        Respiratory WDL    Respiratory WDL WDL        Skin Circulation/Temperature WDL    Skin Circulation/Temperature WDL WDL        Cardiac WDL    Cardiac WDL WDL        Cognitive/Neuro/Behavioral WDL    Cognitive/Neuro/Behavioral WDL X;speech     Speech slurred        Pupils (CN II)    Pupil PERRLA yes     Pupil Size Left 4 mm     Pupil Size Right 4 mm        Sary Coma Scale    Best Eye Response 4-->(E4) spontaneous     Best Motor Response 6-->(M6) obeys commands     Best Verbal Response 5-->(V5) oriented     [place] Coma Scale Score 15

## 2024-02-07 NOTE — DISCHARGE INSTRUCTIONS
Thank you for choosing our Emergency Department for your care.     You may receive a phone call or letter for a survey about your care in our ED.  Please complete this as this is how we improve care for our patients.     If you have any questions after leaving the ED you can call or text me on my cell phone at [phone].  This does not mean that I am on call, but I will get back to you.  If you are not doing well please return to the ED.     Sincerely,    Dr Bakari Su M.D.

## 2024-02-07 NOTE — ED PROVIDER NOTES
"  History     Chief Complaint   Patient presents with    Slurred Speech     The history is provided by the patient and a parent.     Sara Liriano is a 48 year old female here with anorexia, nausea, dark urine. She was incontinent of diarrhea and dark urine on Monday night, two days ago.    She was seen here three days ago after a fall at her mother's house. She has a recent left CARLOTA and uses a walker. She turned and slipped and fell. Xrays and CT were negative and she did well with ambulation in the ED prior to discharge.     She has a history of anorexia nervosa, hypothyroidism, seizure disorder (on Lamictal and Vimpat).     Allergies:  Allergies   Allergen Reactions    Fluoxetine Other (See Comments) and Swelling     Blurred vision, heart racing, face swelling    Amoxicillin Other (See Comments)     \"Thrush\"       Problem List:    Patient Active Problem List    Diagnosis Date Noted    Status post-operative repair of closed fracture of left hip 01/18/2024     Priority: Medium    Closed fracture of neck of left femur, initial encounter (H) 01/18/2024     Priority: Medium    Closed fracture of superior ramus of right pubis with routine healing, subsequent encounter 01/18/2024     Priority: Medium    Anorexia 11/29/2023     Priority: Medium    Seizure disorder (H) 11/08/2023     Priority: Medium    Bony sclerosis 11/08/2023     Priority: Medium    Primary osteoarthritis of right hip 11/08/2023     Priority: Medium    Contusion of right hip, initial encounter 11/08/2023     Priority: Medium    Fall from slip, trip, or stumble, initial encounter 11/08/2023     Priority: Medium    Shoulder injury, right, initial encounter 11/08/2023     Priority: Medium    Inner ear dysfunction, left 11/08/2023     Priority: Medium    Closed nondisplaced fracture of pelvis, unspecified part of pelvis, initial encounter (H) 11/08/2023     Priority: Medium    Arthritis of right glenohumeral joint 11/08/2023     Priority: Medium    "     Past Medical History:    Past Medical History:   Diagnosis Date    Anorexia     Atypical depression     Hypothyroidism     Intracranial hemorrhage (H) 2018    Osteopenia     PTSD (post-traumatic stress disorder)     Seizure disorder (H)     TBI (traumatic brain injury) (H) 05/2018       Past Surgical History:    Past Surgical History:   Procedure Laterality Date    CRANIOPLASTY  08/08/2018    CRANIOTOMY  05/2018    TRACHEOSTOMY  06/2018       Family History:    Family History   Problem Relation Age of Onset    Hypertension Mother     Heart Failure Father     Diabetes Father     Hypertension Sister        Social History:  Marital Status:  Single [1]  Social History     Tobacco Use    Smoking status: Never     Passive exposure: Never    Smokeless tobacco: Never   Vaping Use    Vaping Use: Never used   Substance Use Topics    Alcohol use: Yes     Comment: Alcoholic Drinks/day: Occassional    Drug use: No     Comment: Drug use: No        Medications:    acetaminophen (TYLENOL) 500 MG tablet  calcium carbonate (OS-MOISES) 500 MG tablet  cyanocobalamin (VITAMIN B-12) 1000 MCG tablet  docusate sodium (COLACE) 100 MG capsule  fluticasone (FLONASE) 50 MCG/ACT nasal spray  Folic Acid-Vit B6-Vit B12 (FOLBEE) 2.5-25-1 MG TABS  hydrOXYzine (VISTARIL) 25 MG capsule  IBANdronate (BONIVA) 150 MG tablet  ibuprofen (ADVIL/MOTRIN) 200 MG tablet  ketorolac (TORADOL) 10 MG tablet  Lacosamide (VIMPAT) 100 MG TABS tablet  lamoTRIgine (LAMICTAL) 200 MG tablet  levothyroxine (SYNTHROID/LEVOTHROID) 50 MCG tablet  magnesium hydroxide (MILK OF MAGNESIA) 400 MG/5ML suspension  melatonin 3 MG tablet  multivitamin, therapeutic (THERA-VIT) TABS tablet  neomycin-polymyxin-dexAMETHasone (MAXITROL) 3.5-57205-2.1 SUSP ophthalmic susp  olopatadine (PATANOL) 0.1 % ophthalmic solution  omeprazole (PRILOSEC OTC) 20 MG EC tablet  oxyCODONE (ROXICODONE) 5 MG tablet  polyethylene glycol (MIRALAX) 17 GM/Dose powder  potassium chloride ER (KLOR-CON M) 20 MEQ  CR tablet  sodium chloride (OCEAN) 0.65 % nasal spray  thiamine (B-1) 100 MG tablet  vitamin D3 (CHOLECALCIFEROL) 50 mcg (2000 units) tablet          Review of Systems   Gastrointestinal:  Positive for diarrhea and nausea.   Genitourinary:         Incontinent of dark urine   All other systems reviewed and are negative.      Physical Exam   BP: 119/82  Pulse: 88  Temp: 97.5  F (36.4  C)  Resp: 18  Weight: 40.8 kg (90 lb)  SpO2: 98 %      Physical Exam  Vitals and nursing note reviewed.   Constitutional:       General: She is not in acute distress.     Appearance: She is ill-appearing. She is not toxic-appearing or diaphoretic.   Neurological:      Mental Status: She is alert.         Results for orders placed or performed during the hospital encounter of 02/07/24 (from the past 24 hour(s))   Glucose by meter   Result Value Ref Range    GLUCOSE BY METER POCT 68 (L) 70 - 99 mg/dL   CBC with platelets differential    Narrative    The following orders were created for panel order CBC with platelets differential.  Procedure                               Abnormality         Status                     ---------                               -----------         ------                     CBC with platelets and d...[[id number]]  Abnormal            Final result                 Please view results for these tests on the individual orders.   Comprehensive metabolic panel   Result Value Ref Range    Sodium 137 135 - 145 mmol/L    Potassium 4.0 3.4 - 5.3 mmol/L    Carbon Dioxide (CO2) 28 22 - 29 mmol/L    Anion Gap 12 7 - 15 mmol/L    Urea Nitrogen 13.9 6.0 - 20.0 mg/dL    Creatinine 0.82 0.51 - 0.95 mg/dL    GFR Estimate 87 >60 mL/min/1.73m2    Calcium 8.0 (L) 8.6 - 10.0 mg/dL    Chloride 97 (L) 98 - 107 mmol/L    Glucose 75 70 - 99 mg/dL    Alkaline Phosphatase 306 (H) 40 - 150 U/L    AST 31 0 - 45 U/L    ALT 25 0 - 50 U/L    Protein Total 5.8 (L) 6.4 - 8.3 g/dL    Albumin 3.3 (L) 3.5 - 5.2 g/dL    Bilirubin Total 0.3 <=1.2  mg/dL   Lipase   Result Value Ref Range    Lipase 9 (L) 13 - 60 U/L   Lactic acid whole blood   Result Value Ref Range    Lactic Acid 1.0 0.7 - 2.0 mmol/L   Magnesium   Result Value Ref Range    Magnesium 3.7 (H) 1.7 - 2.3 mg/dL   CBC with platelets and differential   Result Value Ref Range    WBC Count 6.8 4.0 - 11.0 10e3/uL    RBC Count 3.61 (L) 3.80 - 5.20 10e6/uL    Hemoglobin 12.7 11.7 - 15.7 g/dL    Hematocrit 37.5 35.0 - 47.0 %     (H) 78 - 100 fL    MCH 35.2 (H) 26.5 - 33.0 pg    MCHC 33.9 31.5 - 36.5 g/dL    RDW 14.1 10.0 - 15.0 %    Platelet Count 347 150 - 450 10e3/uL    % Neutrophils 71 %    % Lymphocytes 23 %    % Monocytes 5 %    % Eosinophils 0 %    % Basophils 1 %    % Immature Granulocytes 0 %    NRBCs per 100 WBC 0 <1 /100    Absolute Neutrophils 4.8 1.6 - 8.3 10e3/uL    Absolute Lymphocytes 1.6 0.8 - 5.3 10e3/uL    Absolute Monocytes 0.4 0.0 - 1.3 10e3/uL    Absolute Eosinophils 0.0 0.0 - 0.7 10e3/uL    Absolute Basophils 0.0 0.0 - 0.2 10e3/uL    Absolute Immature Granulocytes 0.0 <=0.4 10e3/uL    Absolute NRBCs 0.0 10e3/uL       Medications - No data to display    Assessments & Plan (with Medical Decision Making)  Sara Liriano is a 48 year old female here with anorexia, nausea, dark urine. She was incontinent of diarrhea and dark urine on Monday night, two days ago.  She was seen here three days ago after a fall at her mother's house. She has a recent left CARLOTA and uses a walker. She turned and slipped and fell. Xrays and CT were negative and she did well with ambulation in the ED prior to discharge.   She has a history of anorexia nervosa, hypothyroidism, seizure disorder (on Lamictal and Vimpat).   VS in the ED /87   Pulse 101   Temp 97.5  F (36.4  C) (Tympanic)   Resp 16   Wt 40.8 kg (90 lb)   LMP 01/01/2013 (Within Years)   SpO2 93%   BMI 15.45 kg/m    Exam shows no acute findings- she has anorexia nervosa and appears in her usual health.   Labs show CBC with normal  WBC, hgb, platelets, CMP with Ca 8.0, protein and albumin low, lipase okay, Mg 3.7.  12:31 PM  She feels better and would like to go. We have not given calcium or anything but she feels better.      I have reviewed the nursing notes.    I have reviewed the findings, diagnosis, plan and need for follow up with the patient.  Medical Decision Making  The patient's presentation was of low complexity (a stable chronic illness).    The patient's evaluation involved:  an assessment requiring an independent historian (see separate area of note for details)  ordering and/or review of 3+ test(s) in this encounter (see separate area of note for details)    The patient's management necessitated only low risk treatment.    Final diagnoses:   Anorexia   Closed fracture of neck of left femur, initial encounter (H)   Closed fracture of superior ramus of right pubis with routine healing, subsequent encounter       2/7/2024   [facility] AND [facility], Smo Hilton MD  02/07/24 8726

## 2024-02-08 ENCOUNTER — HOSPITAL ENCOUNTER (OUTPATIENT)
Dept: GENERAL RADIOLOGY | Facility: OTHER | Age: 49
Discharge: HOME | End: 2024-02-08
Payer: OTHER GOVERNMENT

## 2024-02-08 ENCOUNTER — OFFICE VISIT (OUTPATIENT)
Dept: INTERNAL MEDICINE | Facility: OTHER | Age: 49
End: 2024-02-08
Payer: OTHER GOVERNMENT

## 2024-02-08 VITALS
DIASTOLIC BLOOD PRESSURE: 76 MMHG | HEART RATE: 88 BPM | WEIGHT: 90 LBS | SYSTOLIC BLOOD PRESSURE: 102 MMHG | RESPIRATION RATE: 16 BRPM | OXYGEN SATURATION: 98 % | BODY MASS INDEX: 15.45 KG/M2

## 2024-02-08 DIAGNOSIS — S32.010G COMPRESSION FRACTURE OF L1 VERTEBRA WITH DELAYED HEALING, SUBSEQUENT ENCOUNTER: ICD-10-CM
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
## 2024-02-08 PROCEDURE — 72100 X-RAY EXAM L-S SPINE 2/3 VWS: CPT

## 2024-02-08 PROCEDURE — 99214 OFFICE O/P EST MOD 30 MIN: CPT

## 2024-02-08 RX ORDER — OXYCODONE HYDROCHLORIDE 5 MG/1
5 TABLET ORAL EVERY 6 HOURS PRN
Qty: 30 TABLET | Refills: 0 | Status: SHIPPED | OUTPATIENT
Start: 2024-02-08 | End: 2024-02-23

## 2024-02-08 RX ORDER — IBANDRONATE SODIUM 150 MG/1
150 TABLET, FILM COATED ORAL
Qty: 1 TABLET | Refills: 1 | Status: SHIPPED | OUTPATIENT
Start: 2024-02-08 | End: 2024-02-12

## 2024-02-08 RX ORDER — CYCLOBENZAPRINE HCL 5 MG
5 TABLET ORAL 3 TIMES DAILY PRN
Qty: 30 TABLET | Refills: 0 | Status: SHIPPED | OUTPATIENT
Start: 2024-02-08 | End: 2024-02-23

## 2024-02-08 ASSESSMENT — PAIN SCALES - GENERAL: PAINLEVEL: WORST PAIN (10)

## 2024-02-08 NOTE — ED NOTES
Pt requested that her mother be called to pick her up. This writer attempted to call her mother, call went to voice mail, message left for her to call back.

## 2024-02-08 NOTE — DISCHARGE INSTRUCTIONS
Labs stable from earlier today with no concerning findings.     Reassuring exam and bleed inside your head is very unlikely. No current signs of concussion but attaching concussion information to this after visit summary which includes concerning signs and symptoms to watch for. OK to sleep throughout the night. Ease back into significant physical and mental activity once you feel back to normal.     Please review attached instructions also for reasons to return to the emergency department.

## 2024-02-08 NOTE — NURSING NOTE
"Chief Complaint   Patient presents with    RECHECK     ER follow Up       Initial /76   Pulse 88   Resp 16   Wt 40.8 kg (90 lb)   LMP 01/01/2013 (Within Years)   SpO2 98%   BMI 15.45 kg/m   Estimated body mass index is 15.45 kg/m  as calculated from the following:    Height as of 2/7/24: 1.626 m (5' 4\").    Weight as of this encounter: 40.8 kg (90 lb).  Medication Review: complete    The next two questions are to help us understand your food security.  If you are feeling you need any assistance in this area, we have resources available to support you today.          2/8/2024   SDOH- Food Insecurity   Within the past 12 months, did you worry that your food would run out before you got money to buy more? N   Within the past 12 months, did the food you bought just not last and you didn t have money to get more? N         Health Care Directive:  Patient does not have a Health Care Directive or Living Will: Discussed advance care planning with patient; however, patient declined at this time.    Francia Webb LPN      "

## 2024-02-08 NOTE — ED PROVIDER NOTES
"  History     Chief Complaint   Patient presents with    Generalized Weakness    Fall    Eating Disorder       Sara Liriano is a 48 year old female who presents with generalized weakness, nausea/vomiting, and fall at home with head trauma.  Seen in the ED here earlier today for anorexia with nausea.  Upon going home patient had nausea and vomiting and mechanical ground-level fall hitting her head with small superficial cut over her right eyebrow.  She was initially lethargic here but upon being seen by provider was alert and fully interactive.  Denies any headache, vision change, numbness, weakness, chest pain, dyspnea.  Reports stable weight.      Allergies   Allergen Reactions    Fluoxetine Other (See Comments) and Swelling     Blurred vision, heart racing, face swelling    Amoxicillin Other (See Comments)     \"Thrush\"       Patient Active Problem List    Diagnosis Date Noted    Status post-operative repair of closed fracture of left hip 01/18/2024     Priority: Medium    Closed fracture of neck of left femur, initial encounter (H) 01/18/2024     Priority: Medium    Closed fracture of superior ramus of right pubis with routine healing, subsequent encounter 01/18/2024     Priority: Medium    Anorexia 11/29/2023     Priority: Medium    Seizure disorder (H) 11/08/2023     Priority: Medium    Bony sclerosis 11/08/2023     Priority: Medium    Primary osteoarthritis of right hip 11/08/2023     Priority: Medium    Contusion of right hip, initial encounter 11/08/2023     Priority: Medium    Fall from slip, trip, or stumble, initial encounter 11/08/2023     Priority: Medium    Shoulder injury, right, initial encounter 11/08/2023     Priority: Medium    Inner ear dysfunction, left 11/08/2023     Priority: Medium    Closed nondisplaced fracture of pelvis, unspecified part of pelvis, initial encounter (H) 11/08/2023     Priority: Medium    Arthritis of right glenohumeral joint 11/08/2023     Priority: Medium       Past " Medical History:   Diagnosis Date    Anorexia     Atypical depression     Hypothyroidism     Intracranial hemorrhage (H) 2018    Osteopenia     PTSD (post-traumatic stress disorder)     Seizure disorder (H)     TBI (traumatic brain injury) (H) 05/2018       Past Surgical History:   Procedure Laterality Date    CRANIOPLASTY  08/08/2018    CRANIOTOMY  05/2018    TRACHEOSTOMY  06/2018       Family History   Problem Relation Age of Onset    Hypertension Mother     Heart Failure Father     Diabetes Father     Hypertension Sister        Social History     Tobacco Use    Smoking status: Never     Passive exposure: Never    Smokeless tobacco: Never   Vaping Use    Vaping Use: Never used   Substance Use Topics    Alcohol use: Yes     Comment: Alcoholic Drinks/day: Occassional    Drug use: No     Comment: Drug use: No       Medications:    acetaminophen (TYLENOL) 500 MG tablet  calcium carbonate (OS-MOISES) 500 MG tablet  cyanocobalamin (VITAMIN B-12) 1000 MCG tablet  docusate sodium (COLACE) 100 MG capsule  fluticasone (FLONASE) 50 MCG/ACT nasal spray  Folic Acid-Vit B6-Vit B12 (FOLBEE) 2.5-25-1 MG TABS  hydrOXYzine (VISTARIL) 25 MG capsule  IBANdronate (BONIVA) 150 MG tablet  ibuprofen (ADVIL/MOTRIN) 200 MG tablet  ketorolac (TORADOL) 10 MG tablet  Lacosamide (VIMPAT) 100 MG TABS tablet  lamoTRIgine (LAMICTAL) 200 MG tablet  levothyroxine (SYNTHROID/LEVOTHROID) 50 MCG tablet  magnesium hydroxide (MILK OF MAGNESIA) 400 MG/5ML suspension  melatonin 3 MG tablet  multivitamin, therapeutic (THERA-VIT) TABS tablet  neomycin-polymyxin-dexAMETHasone (MAXITROL) 3.5-40027-3.1 SUSP ophthalmic susp  olopatadine (PATANOL) 0.1 % ophthalmic solution  omeprazole (PRILOSEC OTC) 20 MG EC tablet  oxyCODONE (ROXICODONE) 5 MG tablet  polyethylene glycol (MIRALAX) 17 GM/Dose powder  potassium chloride ER (KLOR-CON M) 20 MEQ CR tablet  sodium chloride (OCEAN) 0.65 % nasal spray  thiamine (B-1) 100 MG tablet  vitamin D3 (CHOLECALCIFEROL) 50 mcg (2000  "units) tablet        Review of Systems: See HPI for pertinent negatives and positives. All other systems reviewed and found to be negative.    Physical Exam   /80   Pulse 89   Temp 97.2  F (36.2  C) (Tympanic)   Resp 22   Ht 1.626 m (5' 4\")   Wt 40.8 kg (90 lb)   LMP 01/01/2013 (Within Years)   SpO2 96%   BMI 15.45 kg/m       General: awake, comfortable, cachectic  HEENT: Small right supraorbital contusion with superficial abrasion over right eyebrow, otherwise atraumatic, PERRL  Respiratory: normal effort, clear to auscultation bilaterally  Cardiovascular: regular rate and rhythm, no murmurs  Abdomen: soft, nondistended, nontender  Back: No spinal tenderness  Extremities: no deformities, edema, or tenderness  Skin: warm, dry, no rashes  Neuro: alert, CN II through XII intact, normal symmetric upper and lower extremity strength/sensation/coordination, no focal deficits  Psych: appropriate mood and affect    ED Course      Results for orders placed or performed during the hospital encounter of 02/07/24 (from the past 24 hour(s))   [place] Draw    Narrative    The following orders were created for panel order [place] Draw.  Procedure                               Abnormality         Status                     ---------                               -----------         ------                     Extra Blue Top Tube[[id number]]                              Final result               Extra Red Top Tube[[id number]]                               Final result               Extra Green Top (Lithium...[[id number]]                      Final result               Extra Purple Top Tube[[id number]]                            Final result               Extra Green Top (Lithium...[[id number]]                      Final result                 Please view results for these tests on the individual orders.   Extra Blue Top Tube   Result Value Ref Range    Hold Specimen x    Extra Red Top Tube   Result Value Ref Range    Hold " Specimen x    Extra Green Top (Lithium Heparin) Tube   Result Value Ref Range    Hold Specimen x    Extra Purple Top Tube   Result Value Ref Range    Hold Specimen x    Extra Green Top (Lithium Heparin) ON ICE   Result Value Ref Range    Hold Specimen x    CBC with platelets differential    Narrative    The following orders were created for panel order CBC with platelets differential.  Procedure                               Abnormality         Status                     ---------                               -----------         ------                     CBC with platelets and d...[[id number]]  Abnormal            Final result                 Please view results for these tests on the individual orders.   Comprehensive metabolic panel   Result Value Ref Range    Sodium 133 (L) 135 - 145 mmol/L    Potassium 4.0 3.4 - 5.3 mmol/L    Carbon Dioxide (CO2) 26 22 - 29 mmol/L    Anion Gap 13 7 - 15 mmol/L    Urea Nitrogen 14.2 6.0 - 20.0 mg/dL    Creatinine 0.72 0.51 - 0.95 mg/dL    GFR Estimate >90 >60 mL/min/1.73m2    Calcium 7.9 (L) 8.6 - 10.0 mg/dL    Chloride 94 (L) 98 - 107 mmol/L    Glucose 98 70 - 99 mg/dL    Alkaline Phosphatase 302 (H) 40 - 150 U/L    AST 30 0 - 45 U/L    ALT 25 0 - 50 U/L    Protein Total 6.1 (L) 6.4 - 8.3 g/dL    Albumin 3.4 (L) 3.5 - 5.2 g/dL    Bilirubin Total 0.3 <=1.2 mg/dL   Lactic acid whole blood   Result Value Ref Range    Lactic Acid 0.9 0.7 - 2.0 mmol/L   CBC with platelets and differential   Result Value Ref Range    WBC Count 6.2 4.0 - 11.0 10e3/uL    RBC Count 3.82 3.80 - 5.20 10e6/uL    Hemoglobin 13.4 11.7 - 15.7 g/dL    Hematocrit 39.2 35.0 - 47.0 %     (H) 78 - 100 fL    MCH 35.1 (H) 26.5 - 33.0 pg    MCHC 34.2 31.5 - 36.5 g/dL    RDW 14.0 10.0 - 15.0 %    Platelet Count 333 150 - 450 10e3/uL    % Neutrophils 66 %    % Lymphocytes 26 %    % Monocytes 6 %    % Eosinophils 1 %    % Basophils 1 %    % Immature Granulocytes 0 %    NRBCs per 100 WBC 0 <1 /100    Absolute  Neutrophils 4.1 1.6 - 8.3 10e3/uL    Absolute Lymphocytes 1.6 0.8 - 5.3 10e3/uL    Absolute Monocytes 0.4 0.0 - 1.3 10e3/uL    Absolute Eosinophils 0.0 0.0 - 0.7 10e3/uL    Absolute Basophils 0.1 0.0 - 0.2 10e3/uL    Absolute Immature Granulocytes 0.0 <=0.4 10e3/uL    Absolute NRBCs 0.0 10e3/uL   Symptomatic Influenza A/B, RSV, & SARS-CoV2 PCR (COVID-19) Nose    Specimen: Nose; Swab   Result Value Ref Range    Influenza A PCR Negative Negative    Influenza B PCR Negative Negative    RSV PCR Negative Negative    SARS CoV2 PCR Negative Negative    Narrative    Testing was performed using the Xpert Xpress CoV2/Flu/RSV Assay on the [employer] Instrument. This test should be ordered for the detection of SARS-CoV-2, influenza, and RSV viruses in individuals who meet clinical and/or epidemiological criteria. Test performance is unknown in asymptomatic patients. This test is for in vitro diagnostic use under the FDA EUA for laboratories certified under CLIA to perform high or moderate complexity testing. This test has not been FDA cleared or approved. A negative result does not rule out the presence of PCR inhibitors in the specimen or target RNA in concentration below the limit of detection for the assay. If only one viral target is positive but coinfection with multiple targets is suspected, the sample should be re-tested with another FDA cleared, approved, or authorized test, if coinfection would change clinical management. This test was validated by the [facility] [facility]. These laboratories are certified under the Clinical Laboratory Improvement Amendments of 1988 (CLIA-88) as qualified to perform high complexity laboratory testing.       Medications   ondansetron (ZOFRAN) injection 4 mg (4 mg Intravenous $Given 2/7/24 3789)       Assessments & Plan (with Medical Decision Making)     I have reviewed the nursing notes.        48 year old female with anorexia evaluated for mechanical ground-level fall  and generalized weakness with nausea and vomiting.  Right small mild supraorbital contusion with superficial abrasion on exam.  Neurologically intact.  Cachectic.  Nausea treated with Zofran with patient feeling improved and requesting discharge home.   Just seen in the ED earlier today for nausea. Labs at this encounter without concerning findings.  Offered Zofran prescription which was declined.  At this point do not see indication for admission as her presentation appears to be due to chronic malnourishment from anorexia nervosa.  It is nighttime and therefore PT/social work unavailable, however she could potentially benefit from these at some point. Discharged home with attached instructions on diagnoses provided including ED return precautions.     I have reviewed the findings, diagnosis, plan, and need for any follow up with the patient.    Patient instructions:   Labs stable from earlier today with no concerning findings.     Reassuring exam and bleed inside your head is very unlikely. No current signs of concussion but attaching concussion information to this after visit summary which includes concerning signs and symptoms to watch for. OK to sleep throughout the night. Ease back into significant physical and mental activity once you feel back to normal.     Please review attached instructions also for reasons to return to the emergency department.      New Prescriptions    No medications on file       Final diagnoses:   Fall at home, initial encounter   Traumatic injury of head, initial encounter   Nausea and vomiting, unspecified vomiting type       2/7/2024   [facility]     Abilio Ahmadi MD  02/08/24 0155

## 2024-02-08 NOTE — ED TRIAGE NOTES
"Pt arrives via [facility] from home.  Pt was seen in the ED earlier today for N/V, dehydrating, anorexia.  Pt went home around 1400 after fluids and felt \"okay\".  Since then the pt is still vomiting and lethargic.  Per EMS pt's mother states she has fallen and there is an abrasion above right eyebrow.  Pt c/o N/V. Pt does appear to be lethargic and only nodding yes and no at this time.     Triage Assessment (Adult)       Row Name 02/07/24 8201          Triage Assessment    Airway WDL WDL        Respiratory WDL    Respiratory WDL WDL        Skin Circulation/Temperature WDL    Skin Circulation/Temperature WDL WDL        Cardiac WDL    Cardiac WDL WDL        Peripheral/Neurovascular WDL    Peripheral Neurovascular WDL WDL        Cognitive/Neuro/Behavioral WDL    Cognitive/Neuro/Behavioral WDL X;speech;level of consciousness     Level of Consciousness lethargic                     "

## 2024-02-08 NOTE — PROGRESS NOTES
Assessment & Plan   Problem List Items Addressed This Visit    None  Visit Diagnoses       Fall, subsequent encounter    -  Primary    Relevant Orders    XR Lumbar Spine 2/3 Views (Completed)    Physical Therapy Referral    Compression fracture of L1 vertebra with delayed healing, subsequent encounter        Relevant Medications    oxyCODONE (ROXICODONE) 5 MG tablet    cyclobenzaprine (FLEXERIL) 5 MG tablet    IBANdronate (BONIVA) 150 MG tablet    Other Relevant Orders    Physical Therapy Referral    DX Hip/Pelvis/Spine           Lumbar x-ray shows increasing collapse of the L1 and L1 vertebral bodies compared to imaging on 2/4/2024.  Prescription for oxycodone and Flexeril for symptoms sent to pharmacy.  Offered ordering a TLSO brace for support, provided her with contact information on [facility] in order to get this customized for her.  Patient does not want a TLSO ordered at this time despite discussion how this can help. Physical therapy order has been placed.  Patient's med list does show that she is on Boniva, patient states she does not think she has ever taken this.  Refill of this was sent to pharmacy.  DEXA scan has been ordered, patient instructed to follow-up with PCP for further guidance on osteoporosis.  Offered resources to patient regarding her anorexia as this is negatively impacting her health.  Patient declines any services at this time. She will follow up if symptoms worsen or do not improve.  Offered referral to orthopedics, patient declines this at this time.                 No follow-ups on file.      GIOVANNA [facility] [place]    Review of Systems   Genitourinary:  Negative for difficulty urinating and pelvic pain.   Musculoskeletal:  Positive for back pain and gait problem (using walker).         Yossi Ruiz is a 48 year old, presenting for the following health issues:    Patient presents to clinic for concern of back pain.  She was  evaluated in the ER yesterday after a fall with head trauma.  She did not initially note having lower back pain after the fall and did not have imaging done in the ER.  She is requesting further imaging today as she has worsening low mid back pain especially with positional changes.  Patient has ongoing history of anorexia, she is very cachectic.  She states that she has had help with her anorexia in the past, has not found it helpful.  She declines having further help with managing her eating disorder.  She states that she has had worsening appetite, difficulty eating, vomiting.  She is able to keep water down, denies abdominal pain.  She states that she has been starting to feel better.  She is currently living with her mother.      RECHECK (ER follow Up)    History of Present Illness       Reason for visit:  Back hip & tail bone pain  Symptom onset:  More than a month  Symptoms include:  Pain especially in my tail bone  fell on my butt 2 nights ago  Symptom intensity:  Severe  Symptom progression:  Staying the same  Had these symptoms before:  Yes  Has tried/received treatment for these symptoms:  Yes  Previous treatment was successful:  Yes  Prior treatment description:  Medication & physical therapy  What makes it worse:  Sitting  What makes it better:  No    She eats 4 or more servings of fruits and vegetables daily.She consumes 4 sweetened beverage(s) daily.She exercises with enough effort to increase her heart rate 30 to 60 minutes per day.  She exercises with enough effort to increase her heart rate 7 days per week.   She is taking medications regularly.                     Objective    /76   Pulse 88   Resp 16   Wt 40.8 kg (90 lb)   LMP 01/01/2013 (Within Years)   SpO2 98%   BMI 15.45 kg/m    Body mass index is 15.45 kg/m .  Physical Exam  Vitals reviewed.   Constitutional:       General: She is not in acute distress.     Appearance: She is cachectic. She is ill-appearing. She is not  toxic-appearing.   HENT:      Head: Normocephalic and atraumatic.   Cardiovascular:      Rate and Rhythm: Normal rate and regular rhythm.   Musculoskeletal:      Thoracic back: No signs of trauma.      Lumbar back: Spasms, tenderness and bony tenderness present. Decreased range of motion.   Neurological:      Mental Status: She is alert and oriented to person, place, and time. Mental status is at baseline.      Motor: Weakness present.      Gait: Gait abnormal.        Results for orders placed or performed during the hospital encounter of 02/08/24   XR Lumbar Spine 2/3 Views     Status: None    Narrative    PROCEDURE: XR LUMBAR SPINE 2/3 VIEWS 2/8/2024 3:51 PM    HISTORY: Fall, subsequent encounter    COMPARISONS: 2/4/2024    TECHNIQUE: AP and lateral    FINDINGS: There are compression fractures of the L3 vertebra stable  from previous examination. There appears to have been worsening  collapse of the L4 compression fracture as compared to previous  examination. There is an L1 compression fracture. There appears to be  in increasing collapse as compared to the previous examination.    The paravertebral soft tissues appear normal.         Impression    IMPRESSION: Increasing collapse of the L1 and L4 vertebral bodies as  compared to 2/4//2024    STEPHANIE SAINI MD         SYSTEM ID:  [id number]   Results for orders placed or performed during the hospital encounter of 02/07/24   [place] Draw     Status: None    Narrative    The following orders were created for panel order [place] Draw.  Procedure                               Abnormality         Status                     ---------                               -----------         ------                     Extra Blue Top Tube[[id number]]                              Final result               Extra Red Top Tube[[id number]]                               Final result               Extra Green Top (Lithium...[[id number]]                      Final result                Extra Purple Top Tube[[id number]]                            Final result               Extra Green Top (Lithium...[[id number]]                      Final result                 Please view results for these tests on the individual orders.   Extra Blue Top Tube     Status: None   Result Value Ref Range    Hold Specimen x    Extra Red Top Tube     Status: None   Result Value Ref Range    Hold Specimen x    Extra Green Top (Lithium Heparin) Tube     Status: None   Result Value Ref Range    Hold Specimen x    Extra Purple Top Tube     Status: None   Result Value Ref Range    Hold Specimen x    Extra Green Top (Lithium Heparin) ON ICE     Status: None   Result Value Ref Range    Hold Specimen x    Comprehensive metabolic panel     Status: Abnormal   Result Value Ref Range    Sodium 133 (L) 135 - 145 mmol/L    Potassium 4.0 3.4 - 5.3 mmol/L    Carbon Dioxide (CO2) 26 22 - 29 mmol/L    Anion Gap 13 7 - 15 mmol/L    Urea Nitrogen 14.2 6.0 - 20.0 mg/dL    Creatinine 0.72 0.51 - 0.95 mg/dL    GFR Estimate >90 >60 mL/min/1.73m2    Calcium 7.9 (L) 8.6 - 10.0 mg/dL    Chloride 94 (L) 98 - 107 mmol/L    Glucose 98 70 - 99 mg/dL    Alkaline Phosphatase 302 (H) 40 - 150 U/L    AST 30 0 - 45 U/L    ALT 25 0 - 50 U/L    Protein Total 6.1 (L) 6.4 - 8.3 g/dL    Albumin 3.4 (L) 3.5 - 5.2 g/dL    Bilirubin Total 0.3 <=1.2 mg/dL   Lactic acid whole blood     Status: Normal   Result Value Ref Range    Lactic Acid 0.9 0.7 - 2.0 mmol/L   CBC with platelets and differential     Status: Abnormal   Result Value Ref Range    WBC Count 6.2 4.0 - 11.0 10e3/uL    RBC Count 3.82 3.80 - 5.20 10e6/uL    Hemoglobin 13.4 11.7 - 15.7 g/dL    Hematocrit 39.2 35.0 - 47.0 %     (H) 78 - 100 fL    MCH 35.1 (H) 26.5 - 33.0 pg    MCHC 34.2 31.5 - 36.5 g/dL    RDW 14.0 10.0 - 15.0 %    Platelet Count 333 150 - 450 10e3/uL    % Neutrophils 66 %    % Lymphocytes 26 %    % Monocytes 6 %    % Eosinophils 1 %    % Basophils 1 %    % Immature Granulocytes 0 %     NRBCs per 100 WBC 0 <1 /100    Absolute Neutrophils 4.1 1.6 - 8.3 10e3/uL    Absolute Lymphocytes 1.6 0.8 - 5.3 10e3/uL    Absolute Monocytes 0.4 0.0 - 1.3 10e3/uL    Absolute Eosinophils 0.0 0.0 - 0.7 10e3/uL    Absolute Basophils 0.1 0.0 - 0.2 10e3/uL    Absolute Immature Granulocytes 0.0 <=0.4 10e3/uL    Absolute NRBCs 0.0 10e3/uL   Symptomatic Influenza A/B, RSV, & SARS-CoV2 PCR (COVID-19) Nose     Status: Normal    Specimen: Nose; Swab   Result Value Ref Range    Influenza A PCR Negative Negative    Influenza B PCR Negative Negative    RSV PCR Negative Negative    SARS CoV2 PCR Negative Negative    Narrative    Testing was performed using the Xpert Xpress CoV2/Flu/RSV Assay on the Cepheid GeneXpert Instrument. This test should be ordered for the detection of SARS-CoV-2, influenza, and RSV viruses in individuals who meet clinical and/or epidemiological criteria. Test performance is unknown in asymptomatic patients. This test is for in vitro diagnostic use under the FDA EUA for laboratories certified under CLIA to perform high or moderate complexity testing. This test has not been FDA cleared or approved. A negative result does not rule out the presence of PCR inhibitors in the specimen or target RNA in concentration below the limit of detection for the assay. If only one viral target is positive but coinfection with multiple targets is suspected, the sample should be re-tested with another FDA cleared, approved, or authorized test, if coinfection would change clinical management. This test was validated by the [facility]. These laboratories are certified under the Clinical Laboratory Improvement Amendments of 1988 (CLIA-88) as qualified to perform high complexity laboratory testing.   CBC with platelets differential     Status: Abnormal    Narrative    The following orders were created for panel order CBC with platelets differential.  Procedure                               Abnormality          Status                     ---------                               -----------         ------                     CBC with platelets and d...[[id number]]  Abnormal            Final result                 Please view results for these tests on the individual orders.                   Signed Electronically by: GIOVANNA Jenkins CNP

## 2024-02-08 NOTE — ED NOTES
Pt is A&O at this time. Pt states that she is feeling better and wants to go home. Provider at bedside.

## 2024-02-08 NOTE — PATIENT INSTRUCTIONS
Bone density scan- you should be started on medication to help improve your bone strength. Prolia is an option to read up on and we can order this if you would like to proceed.

## 2024-02-09 ASSESSMENT — ENCOUNTER SYMPTOMS
DIFFICULTY URINATING: 0
BACK PAIN: 1

## 2024-02-12 ENCOUNTER — OFFICE VISIT (OUTPATIENT)
Dept: FAMILY MEDICINE | Facility: OTHER | Age: 49
End: 2024-02-12
Attending: PHYSICIAN ASSISTANT
Payer: OTHER GOVERNMENT

## 2024-02-12 VITALS
SYSTOLIC BLOOD PRESSURE: 118 MMHG | TEMPERATURE: 97.2 F | OXYGEN SATURATION: 98 % | HEART RATE: 120 BPM | RESPIRATION RATE: 20 BRPM | DIASTOLIC BLOOD PRESSURE: 68 MMHG

## 2024-02-12 DIAGNOSIS — R82.90 ABNORMAL URINE ODOR: ICD-10-CM

## 2024-02-12 DIAGNOSIS — N30.00 ACUTE CYSTITIS WITHOUT HEMATURIA: Primary | ICD-10-CM

## 2024-02-12 LAB
ALBUMIN UR-MCNC: 30 MG/DL
AMORPH CRY #/AREA URNS HPF: ABNORMAL /HPF
APPEARANCE UR: ABNORMAL
BACTERIA #/AREA URNS HPF: ABNORMAL /HPF
BILIRUB UR QL STRIP: NEGATIVE
COLOR UR AUTO: YELLOW
GLUCOSE UR STRIP-MCNC: NEGATIVE MG/DL
HGB UR QL STRIP: NEGATIVE
HYALINE CASTS: 14 /LPF
KETONES UR STRIP-MCNC: NEGATIVE MG/DL
LEUKOCYTE ESTERASE UR QL STRIP: ABNORMAL
MUCOUS THREADS #/AREA URNS LPF: PRESENT /LPF
NITRATE UR QL: NEGATIVE
PH UR STRIP: 8 [PH] (ref 5–9)
RBC URINE: 7 /HPF
SP GR UR STRIP: 1.02 (ref 1–1.03)
SQUAMOUS EPITHELIAL: 3 /HPF
UROBILINOGEN UR STRIP-MCNC: NORMAL MG/DL
WBC URINE: 0 /HPF

## 2024-02-12 PROCEDURE — 81001 URINALYSIS AUTO W/SCOPE: CPT | Mod: ZL | Performed by: PHYSICIAN ASSISTANT

## 2024-02-12 PROCEDURE — 87086 URINE CULTURE/COLONY COUNT: CPT | Mod: ZL | Performed by: PHYSICIAN ASSISTANT

## 2024-02-12 PROCEDURE — 99214 OFFICE O/P EST MOD 30 MIN: CPT | Performed by: PHYSICIAN ASSISTANT

## 2024-02-12 RX ORDER — CEPHALEXIN 500 MG/1
500 CAPSULE ORAL 2 TIMES DAILY
Qty: 10 CAPSULE | Refills: 0 | Status: SHIPPED | OUTPATIENT
Start: 2024-02-12 | End: 2024-02-17

## 2024-02-12 RX ORDER — IBANDRONATE SODIUM 150 MG/1
TABLET, FILM COATED ORAL
Qty: 3 TABLET | Refills: 0 | Status: SHIPPED | OUTPATIENT
Start: 2024-02-12 | End: 2024-06-12

## 2024-02-12 ASSESSMENT — PAIN SCALES - GENERAL: PAINLEVEL: EXTREME PAIN (9)

## 2024-02-12 NOTE — NURSING NOTE
"Patient presents to the clinic for concerns about possible UTI.    FOOD SECURITY SCREENING QUESTIONS:    The next two questions are to help us understand your food security.  If you are feeling you need any assistance in this area, we have resources available to support you today.    Hunger Vital Signs:  Within the past 12 months we worried whether our food would run out before we got money to buy more. Never  Within the past 12 months the food we bought just didn't last and we didn't have money to get more. Never    Advance Care Directive on file? no  Advance Care Directive provided to patient? Yes.    Chief Complaint   Patient presents with    Urinary Problem       Initial /68 (BP Location: Right arm, Patient Position: Sitting, Cuff Size: Adult Regular)   Pulse 120   Temp 97.2  F (36.2  C) (Tympanic)   Resp 20   LMP 01/01/2013 (Within Years)   SpO2 98%  Estimated body mass index is 15.45 kg/m  as calculated from the following:    Height as of 2/7/24: 1.626 m (5' 4\").    Weight as of 2/8/24: 40.8 kg (90 lb).  Medication Reconciliation: complete        Barb Gabriel LPN     "

## 2024-02-12 NOTE — PROGRESS NOTES
"  Assessment & Plan       ICD-10-CM    1. Acute cystitis without hematuria  N30.00       2. Abnormal urine odor  R82.90 UA Macroscopic with reflex to Microscopic and Culture     Urine Culture        Vitals stable. PE available for review below. UA results: proteinuria, moderate leukocyte esterase, bacteria. Based off UA results, patient does meet criteria for antibiotic therapy. I did discuss with patient that a urine culture was performed, that we will inform patient if a change to their treatment plan needs to occur based off culture results - with urine cultures typically returning in 1-3 days. In the meantime, recommend, alternating tylenol and ibuprofen every 4-6 hours as needed, warm heating pad, pushing fluids/hydration, cranberry juice/pills, urinating when urge arises. May also try over the counter remedies such as Azo (as long as pyridium not already prescribed). Patient aware that if they do take Azo, that this medication can change color of urine to an \"orange\" color. If fevers, chills, flank pain/back pain, inability to urinate/struggle to urinate, signs of dehydration or other worrisome signs occur, patient agreeable to follow up for reevaluation. Patient is in agreement and understanding of the above treatment plan. All questions and concerns were addressed and answered to patient's satisfaction. AVS reviewed with patient.     See Patient Instructions    Return if symptoms worsen or fail to improve.    Yossi Ruiz is a 48 year old, presenting for the following health issues:  Urinary Problem        2/12/2024     2:54 PM   Additional Questions   Roomed by angelique grider lpn     LILIAN Ruiz presents with UTI concerns x 1-2 days.  She notes that she initially started feeling under the weather about a week ago and then over the last 1 to 2 days has noted urinary burning, malodorous urine and just feeling under the weather.  She declines any vaginal discharge or itching.    Genitourinary - " Female  Onset/Duration: 24 hours  Description:   Painful urination (Dysuria): YES           Frequency: No  Blood in urine (Hematuria): No  Delay in urine (Hesitency): YES  Intensity: severe  Progression of Symptoms:  worsening  Accompanying Signs & Symptoms:  Fever/chills: No  Flank pain: YES  Nausea and vomiting: YES  Vaginal symptoms: feels like vagina is hot at times  Abdominal/Pelvic Pain: YES  History:   History of frequent UTI s: No  History of kidney stones: No  Sexually Active: No  Possibility of pregnancy: No  Precipitating or alleviating factors: None  Therapies tried and outcome:  none     Review of Systems  Constitutional, HEENT, cardiovascular, pulmonary, GI, , musculoskeletal, neuro, skin, endocrine and psych systems are negative, except as otherwise noted.        Objective    /68 (BP Location: Right arm, Patient Position: Sitting, Cuff Size: Adult Regular)   Pulse 120   Temp 97.2  F (36.2  C) (Tympanic)   Resp 20   LMP 01/01/2013 (Within Years)   SpO2 98%   There is no height or weight on file to calculate BMI.  Physical Exam   GENERAL: alert and no distress  EYES: Eyes grossly normal to inspection, PERRL and conjunctivae and sclerae normal  RESP: lungs clear to auscultation - no rales, rhonchi or wheezes  CV: regular rate and rhythm, normal S1 S2, no S3 or S4, no murmur, click or rub, no peripheral edema  ABDOMEN: soft, nontender, no hepatosplenomegaly, no masses and bowel sounds normal (patient attempted to leave UA sample while in room - nursing instructed patient that normally going to lab for this).   MS: sitting upright in wheelchair  SKIN: no suspicious lesions or rashes  BACK: no CVA tenderness, no paralumbar tenderness  PSYCH: mentation appears normal, affect normal/bright  LYMPH: normal ant/post cervical, supraclavicular nodes    No results found for any visits on 02/12/24.        Signed Electronically by: Tammy Rhoades PA-C

## 2024-02-12 NOTE — TELEPHONE ENCOUNTER
[facility] sent Rx request for the following:      Pt is requesting 90 day supply.  IBANdronate (BONIVA) 150 MG tablet 1 tablet 1 2/8/2024 -- No   Sig - Route: Take 1 tablet (150 mg) by mouth every 30 days - Oral     Kayley Thomas RN .............. 2/12/2024  4:54 PM

## 2024-02-12 NOTE — PATIENT INSTRUCTIONS
You were prescribed an antibiotic, please take into consideration the following information:  - Take entire course of antibiotic even if you start to feel better.  - Antibiotics can cause stomach upset including nausea and diarrhea. Read your bottle or ask the pharmacist if antibiotic can be taken with food to help prevent nausea. If you have symptoms of diarrhea you can take an over-the-counter probiotic and/or increase foods with probiotics such as yogurt, Nadir, sauerkraut.  -Use caution in sunlight as can lead to increased risk of sunburn while on ABX (antibiotics).     Please refer to your AVS for follow up and pain/symptoms management recommendations (I.e.: medications, helpful conservative treatment modalities, appropriate follow up if need to a specialist or family practice, etc.). Please return to urgent care if your symptoms change or worsen.     Discharge instructions:  -Follow up with persistent symptoms with primary care (or ER if severe worsening)  -If you were prescribed a medication(s), please take this as prescribed/directed  -Monitor your symptoms, if changing/worsening, return to UC/ER or PCP for follow up    Urinary Tract Infection (UTI):  -If you were prescribed an antibiotic, please take this as directed and until completion.     -If your urine was sent for a culture, please note this takes on average 1-3 days to return. Urine cultures will show us if there is/if what bacteria grows from your urine. If a change to your treatment plan (antibiotics, etc.) is needed based off your urine culture we will contact you.     -For pain control (if applicable), alternating Tylenol and Ibuprofen is recommended  -Alternate every 4 hours as needed. I.e.: Ibuprofen at 8am, Tylenol 12pm, Ibuprofen 4pm   -Daily maximum of Tylenol is 4000mg (recommend staying under 3000mg)  -Daily maximum of Ibuprofen is 3200 mg    -A warm heating pad may help as well.     -Rest/relaxation and keeping hydrated with clear  liquids (ie: water or cranberry juice - do not do cranberry juice if on Coumadin/Warfarin).     -Empty your bladder when you feel the urge versus holding urine, after urinating you can bear-down to empty bladder completely, after peeing wipe front to back to avoid introducing bacteria towards vaginal area.     -AZO/Pyridium - may take up to 3 times a day, note that this may turn your urine orange    -Avoid sexual intercourse until you have completed your medication.     -Return to ER if any of the following occur: Worsening of symptoms. Fever over 101 F (38.3 C), No improvement by the third day of treatment, Increasing back or abdominal pain, vomiting, unable to keep fluids or medicine down, weakness, dizziness, or fainting, vaginal discharge, pain, redness, or swelling of the vaginal area

## 2024-02-13 ENCOUNTER — APPOINTMENT (OUTPATIENT)
Dept: GENERAL RADIOLOGY | Facility: OTHER | Age: 49
End: 2024-02-13
Attending: STUDENT IN AN ORGANIZED HEALTH CARE EDUCATION/TRAINING PROGRAM
Payer: OTHER GOVERNMENT

## 2024-02-13 ENCOUNTER — HOSPITAL ENCOUNTER (EMERGENCY)
Facility: OTHER | Age: 49
Discharge: HOME | End: 2024-02-13
Attending: STUDENT IN AN ORGANIZED HEALTH CARE EDUCATION/TRAINING PROGRAM | Admitting: STUDENT IN AN ORGANIZED HEALTH CARE EDUCATION/TRAINING PROGRAM
Payer: OTHER GOVERNMENT

## 2024-02-13 ENCOUNTER — TELEPHONE (OUTPATIENT)
Dept: FAMILY MEDICINE | Facility: OTHER | Age: 49
End: 2024-02-13

## 2024-02-13 VITALS
WEIGHT: 90 LBS | SYSTOLIC BLOOD PRESSURE: 116 MMHG | BODY MASS INDEX: 15.45 KG/M2 | RESPIRATION RATE: 20 BRPM | TEMPERATURE: 97.8 F | DIASTOLIC BLOOD PRESSURE: 82 MMHG | HEART RATE: 89 BPM | OXYGEN SATURATION: 99 %

## 2024-02-13 DIAGNOSIS — M21.372 LEFT FOOT DROP: ICD-10-CM

## 2024-02-13 DIAGNOSIS — N90.89 BURNING SENSATION OF FEMALE PERINEUM: ICD-10-CM

## 2024-02-13 DIAGNOSIS — E83.41 HYPERMAGNESEMIA: ICD-10-CM

## 2024-02-13 LAB
ANION GAP SERPL CALCULATED.3IONS-SCNC: 9 MMOL/L (ref 7–15)
BACTERIAL VAGINOSIS VAG-IMP: NEGATIVE
BASOPHILS # BLD AUTO: 0 10E3/UL (ref 0–0.2)
BASOPHILS NFR BLD AUTO: 1 %
BUN SERPL-MCNC: 7.4 MG/DL (ref 6–20)
CALCIUM SERPL-MCNC: 8.6 MG/DL (ref 8.6–10)
CANDIDA DNA VAG QL NAA+PROBE: NOT DETECTED
CANDIDA GLABRATA / CANDIDA KRUSEI DNA: NOT DETECTED
CHLORIDE SERPL-SCNC: 96 MMOL/L (ref 98–107)
CREAT SERPL-MCNC: 0.67 MG/DL (ref 0.51–0.95)
DEPRECATED HCO3 PLAS-SCNC: 32 MMOL/L (ref 22–29)
EGFRCR SERPLBLD CKD-EPI 2021: >90 ML/MIN/1.73M2
EOSINOPHIL # BLD AUTO: 0 10E3/UL (ref 0–0.7)
EOSINOPHIL NFR BLD AUTO: 0 %
ERYTHROCYTE [DISTWIDTH] IN BLOOD BY AUTOMATED COUNT: 13.6 % (ref 10–15)
GLUCOSE SERPL-MCNC: 63 MG/DL (ref 70–99)
HCT VFR BLD AUTO: 39.7 % (ref 35–47)
HGB BLD-MCNC: 13.3 G/DL (ref 11.7–15.7)
HOLD SPECIMEN: NORMAL
IMM GRANULOCYTES # BLD: 0 10E3/UL
IMM GRANULOCYTES NFR BLD: 0 %
LYMPHOCYTES # BLD AUTO: 1.7 10E3/UL (ref 0.8–5.3)
LYMPHOCYTES NFR BLD AUTO: 23 %
MAGNESIUM SERPL-MCNC: 2.9 MG/DL (ref 1.7–2.3)
MCH RBC QN AUTO: 34.2 PG (ref 26.5–33)
MCHC RBC AUTO-ENTMCNC: 33.5 G/DL (ref 31.5–36.5)
MCV RBC AUTO: 102 FL (ref 78–100)
MONOCYTES # BLD AUTO: 0.8 10E3/UL (ref 0–1.3)
MONOCYTES NFR BLD AUTO: 10 %
NEUTROPHILS # BLD AUTO: 5.1 10E3/UL (ref 1.6–8.3)
NEUTROPHILS NFR BLD AUTO: 66 %
NRBC # BLD AUTO: 0 10E3/UL
NRBC BLD AUTO-RTO: 0 /100
PLATELET # BLD AUTO: 471 10E3/UL (ref 150–450)
POTASSIUM SERPL-SCNC: 4 MMOL/L (ref 3.4–5.3)
RBC # BLD AUTO: 3.89 10E6/UL (ref 3.8–5.2)
SODIUM SERPL-SCNC: 137 MMOL/L (ref 135–145)
T VAGINALIS DNA VAG QL NAA+PROBE: NOT DETECTED
T4 FREE SERPL-MCNC: 1.75 NG/DL (ref 0.9–1.7)
TSH SERPL DL<=0.005 MIU/L-ACNC: 5.4 UIU/ML (ref 0.3–4.2)
WBC # BLD AUTO: 7.6 10E3/UL (ref 4–11)

## 2024-02-13 PROCEDURE — 36415 COLL VENOUS BLD VENIPUNCTURE: CPT | Performed by: STUDENT IN AN ORGANIZED HEALTH CARE EDUCATION/TRAINING PROGRAM

## 2024-02-13 PROCEDURE — 80048 BASIC METABOLIC PNL TOTAL CA: CPT | Performed by: STUDENT IN AN ORGANIZED HEALTH CARE EDUCATION/TRAINING PROGRAM

## 2024-02-13 PROCEDURE — 99284 EMERGENCY DEPT VISIT MOD MDM: CPT | Performed by: STUDENT IN AN ORGANIZED HEALTH CARE EDUCATION/TRAINING PROGRAM

## 2024-02-13 PROCEDURE — 84439 ASSAY OF FREE THYROXINE: CPT | Performed by: STUDENT IN AN ORGANIZED HEALTH CARE EDUCATION/TRAINING PROGRAM

## 2024-02-13 PROCEDURE — 84443 ASSAY THYROID STIM HORMONE: CPT | Performed by: STUDENT IN AN ORGANIZED HEALTH CARE EDUCATION/TRAINING PROGRAM

## 2024-02-13 PROCEDURE — 72100 X-RAY EXAM L-S SPINE 2/3 VWS: CPT

## 2024-02-13 PROCEDURE — 0352U MULTIPLEX VAGINAL PANEL BY PCR: CPT | Performed by: STUDENT IN AN ORGANIZED HEALTH CARE EDUCATION/TRAINING PROGRAM

## 2024-02-13 PROCEDURE — 85025 COMPLETE CBC W/AUTO DIFF WBC: CPT | Performed by: STUDENT IN AN ORGANIZED HEALTH CARE EDUCATION/TRAINING PROGRAM

## 2024-02-13 PROCEDURE — 83735 ASSAY OF MAGNESIUM: CPT | Performed by: STUDENT IN AN ORGANIZED HEALTH CARE EDUCATION/TRAINING PROGRAM

## 2024-02-13 NOTE — TELEPHONE ENCOUNTER
[facility] called back saying that Henrik told them that Dr. Ahmadi could not put th order in for an MRI but that Tammy her PCP had to. Please put order in and discuss with Dr. Ahmadi if needed on when patient should be seen by PCP after MRI.    Berta Coto on 2/13/2024 at 3:41 PM

## 2024-02-13 NOTE — ED NOTES
Phone call placed to Aron Mckeon at [facility].    Erinn Schulte RN on 2/13/2024 at 1:55 PM

## 2024-02-13 NOTE — TELEPHONE ENCOUNTER
"Dr. Ahmadi already ordered MRI, this is order is available under the imaging tab \"ordered\", per ER note, he recommended AFO ankle brace through [facility]. She would need to be seen for me to place DME order.     Tammy Rhoades PA-C  2/13/2024  4:37 PM    "

## 2024-02-13 NOTE — ED TRIAGE NOTES
ED Nursing Triage Note (General)   ________________________________    Sara Liriano is a 48 year old Female that presents to triage via private vehicle with complaints of extremity weakness and trouble urinating.  Patient states she was cleared by [facility] yesterday at her post op apt for hip surgery, however, came to [facility] yesterday due to urine burning/frequency.  Patient states this continued throughout the night and states she is also having increased pain on affected side.  Patient states she was able to ambulate with a walker yesterday, however, is now unable today.   Significant symptoms had onset 24 hour(s) ago.  LMP 01/01/2013 (Within Years)       PRE HOSPITAL PRIOR LIVING SITUATION Alone

## 2024-02-13 NOTE — ED PROVIDER NOTES
"  History     Chief Complaint   Patient presents with    Extremity Weakness    Foot Pain       Sara Liriano is a 48 year old female who presents with left foot drop.  Onset over the past day.  No recent trauma.  She did have x-ray of the lumbar spine several days ago demonstrating worsening L1 and L4 vertebral body collapse.  Denies any numbness in the left lower extremity.  No fever, chills, bowel or bladder changes, however does endorse trouble urinating.  Recently seen in clinic yesterday for perineal burning and diagnosed with UTI and started on Keflex.  Burning sensation is constant over the past approximate week.  Endorses some small smelling vaginal discharge as well.      Allergies   Allergen Reactions    Fluoxetine Other (See Comments) and Swelling     Blurred vision, heart racing, face swelling    Amoxicillin Other (See Comments)     \"Thrush\"       Patient Active Problem List    Diagnosis Date Noted    Status post-operative repair of closed fracture of left hip 01/18/2024     Priority: Medium    Closed fracture of neck of left femur, initial encounter (H) 01/18/2024     Priority: Medium    Closed fracture of superior ramus of right pubis with routine healing, subsequent encounter 01/18/2024     Priority: Medium    Anorexia 11/29/2023     Priority: Medium    Seizure disorder (H) 11/08/2023     Priority: Medium    Bony sclerosis 11/08/2023     Priority: Medium    Primary osteoarthritis of right hip 11/08/2023     Priority: Medium    Contusion of right hip, initial encounter 11/08/2023     Priority: Medium    Fall from slip, trip, or stumble, initial encounter 11/08/2023     Priority: Medium    Shoulder injury, right, initial encounter 11/08/2023     Priority: Medium    Inner ear dysfunction, left 11/08/2023     Priority: Medium    Closed nondisplaced fracture of pelvis, unspecified part of pelvis, initial encounter (H) 11/08/2023     Priority: Medium    Arthritis of right glenohumeral joint 11/08/2023 "     Priority: Medium       Past Medical History:   Diagnosis Date    Anorexia     Atypical depression     Hypothyroidism     Intracranial hemorrhage (H) 2018    Osteopenia     PTSD (post-traumatic stress disorder)     Seizure disorder (H)     TBI (traumatic brain injury) (H) 05/2018       Past Surgical History:   Procedure Laterality Date    CRANIOPLASTY  08/08/2018    CRANIOTOMY  05/2018    TRACHEOSTOMY  06/2018       Family History   Problem Relation Age of Onset    Hypertension Mother     Heart Failure Father     Diabetes Father     Hypertension Sister        Social History     Tobacco Use    Smoking status: Never     Passive exposure: Never    Smokeless tobacco: Never   Vaping Use    Vaping Use: Never used   Substance Use Topics    Alcohol use: Yes     Comment: Alcoholic Drinks/day: Occassional    Drug use: No     Comment: Drug use: No       Medications:    acetaminophen (TYLENOL) 500 MG tablet  calcium carbonate (OS-MOISES) 500 MG tablet  cephALEXin (KEFLEX) 500 MG capsule  cyanocobalamin (VITAMIN B-12) 1000 MCG tablet  cyclobenzaprine (FLEXERIL) 5 MG tablet  docusate sodium (COLACE) 100 MG capsule  fluticasone (FLONASE) 50 MCG/ACT nasal spray  Folic Acid-Vit B6-Vit B12 (FOLBEE) 2.5-25-1 MG TABS  hydrOXYzine (VISTARIL) 25 MG capsule  IBANdronate (BONIVA) 150 MG tablet  ibuprofen (ADVIL/MOTRIN) 200 MG tablet  ketorolac (TORADOL) 10 MG tablet  Lacosamide (VIMPAT) 100 MG TABS tablet  lamoTRIgine (LAMICTAL) 200 MG tablet  levothyroxine (SYNTHROID/LEVOTHROID) 50 MCG tablet  magnesium hydroxide (MILK OF MAGNESIA) 400 MG/5ML suspension  melatonin 3 MG tablet  multivitamin, therapeutic (THERA-VIT) TABS tablet  neomycin-polymyxin-dexAMETHasone (MAXITROL) 3.5-06711-3.1 SUSP ophthalmic susp  olopatadine (PATANOL) 0.1 % ophthalmic solution  omeprazole (PRILOSEC OTC) 20 MG EC tablet  oxyCODONE (ROXICODONE) 5 MG tablet  oxyCODONE (ROXICODONE) 5 MG tablet  polyethylene glycol (MIRALAX) 17 GM/Dose powder  potassium chloride ER  (KLOR-CON M) 20 MEQ CR tablet  sodium chloride (OCEAN) 0.65 % nasal spray  thiamine (B-1) 100 MG tablet  vitamin D3 (CHOLECALCIFEROL) 50 mcg (2000 units) tablet        Review of Systems: See HPI for pertinent negatives and positives. All other systems reviewed and found to be negative.    Physical Exam   /82   Pulse 89   Temp 97.8  F (36.6  C) (Tympanic)   Resp 20   Wt 40.8 kg (90 lb)   LMP 01/01/2013 (Within Years)   SpO2 99%   BMI 15.45 kg/m       General: awake, comfortable, cachectic  HEENT: atraumatic  Respiratory: normal effort, clear to auscultation bilaterally  Cardiovascular: regular rate and rhythm, no murmurs  Abdomen: soft, nondistended, nontender  Extremities: no deformities, edema, or tenderness  Skin: warm, dry, no rashes or discoloration  Neuro: alert, left foot drop unable to dorsiflex left foot, symmetric plantar flexion strength age-appropriate, symmetric lower extremity sensation intact  Psych: appropriate mood and affect    ED Course      Results for orders placed or performed during the hospital encounter of 02/13/24 (from the past 24 hour(s))   [place] Draw    Narrative    The following orders were created for panel order [place] Draw.  Procedure                               Abnormality         Status                     ---------                               -----------         ------                     Extra Blue Top Tube[[id number]]                              Final result               Extra Red Top Tube[[id number]]                               Final result               Extra Green Top (Lithium...[[id number]]                      Final result               Extra Purple Top Tube[[id number]]                            Final result               Extra Green Top (Lithium...[[id number]]                      Final result                 Please view results for these tests on the individual orders.   Extra Blue Top Tube   Result Value Ref Range    Hold Specimen JIC    Extra Red Top  Tube   Result Value Ref Range    Hold Specimen JIC    Extra Green Top (Lithium Heparin) Tube   Result Value Ref Range    Hold Specimen JIC    Extra Purple Top Tube   Result Value Ref Range    Hold Specimen JIC    Extra Green Top (Lithium Heparin) ON ICE   Result Value Ref Range    Hold Specimen JIC    CBC with platelets differential    Narrative    The following orders were created for panel order CBC with platelets differential.  Procedure                               Abnormality         Status                     ---------                               -----------         ------                     CBC with platelets and d...[[id number]]  Abnormal            Final result                 Please view results for these tests on the individual orders.   Basic metabolic panel   Result Value Ref Range    Sodium 137 135 - 145 mmol/L    Potassium 4.0 3.4 - 5.3 mmol/L    Chloride 96 (L) 98 - 107 mmol/L    Carbon Dioxide (CO2) 32 (H) 22 - 29 mmol/L    Anion Gap 9 7 - 15 mmol/L    Urea Nitrogen 7.4 6.0 - 20.0 mg/dL    Creatinine 0.67 0.51 - 0.95 mg/dL    GFR Estimate >90 >60 mL/min/1.73m2    Calcium 8.6 8.6 - 10.0 mg/dL    Glucose 63 (L) 70 - 99 mg/dL   Magnesium   Result Value Ref Range    Magnesium 2.9 (H) 1.7 - 2.3 mg/dL   CBC with platelets and differential   Result Value Ref Range    WBC Count 7.6 4.0 - 11.0 10e3/uL    RBC Count 3.89 3.80 - 5.20 10e6/uL    Hemoglobin 13.3 11.7 - 15.7 g/dL    Hematocrit 39.7 35.0 - 47.0 %     (H) 78 - 100 fL    MCH 34.2 (H) 26.5 - 33.0 pg    MCHC 33.5 31.5 - 36.5 g/dL    RDW 13.6 10.0 - 15.0 %    Platelet Count 471 (H) 150 - 450 10e3/uL    % Neutrophils 66 %    % Lymphocytes 23 %    % Monocytes 10 %    % Eosinophils 0 %    % Basophils 1 %    % Immature Granulocytes 0 %    NRBCs per 100 WBC 0 <1 /100    Absolute Neutrophils 5.1 1.6 - 8.3 10e3/uL    Absolute Lymphocytes 1.7 0.8 - 5.3 10e3/uL    Absolute Monocytes 0.8 0.0 - 1.3 10e3/uL    Absolute Eosinophils 0.0 0.0 - 0.7 10e3/uL     Absolute Basophils 0.0 0.0 - 0.2 10e3/uL    Absolute Immature Granulocytes 0.0 <=0.4 10e3/uL    Absolute NRBCs 0.0 10e3/uL   TSH Reflex GH   Result Value Ref Range    TSH 5.40 (H) 0.30 - 4.20 uIU/mL   T4 free   Result Value Ref Range    Free T4 1.75 (H) 0.90 - 1.70 ng/dL   Multiplex Vaginal Panel by PCR    Specimen: Vagina; Swab   Result Value Ref Range    Bacterial Vaginosis Organism DNA Negative Negative    Candida Group DNA Not Detected Not Detected    Candida glabrata / Karla krusei DNA Not Detected Not Detected    Trichomonas vaginalis DNA Not Detected Not Detected    Narrative    The Xpert  Xpress MVP test, performed on the [employer], is an automated, qualitative in vitro diagnostic test for the detection of DNA targets from anaerobic bacteria associated with bacterial vaginosis, Candida species associated with vulvovaginal candidiasis, and Trichomonas vaginalis. The assay uses clinician-collected and self-collected vaginal swabs from patients who are symptomatic for vaginitis/ vaginosis. The Xpert  Xpress MVP test utilizes real-time polymerase chain reaction (PCR) for the amplification of specific DNA targets and utilizes fluorogenic target-specific hybridization probes to detect and differentiate DNA. It is intended to aid in the diagnosis of vaginal infections in women with a clinical presentation consistent with bacterial vaginosis, vulvovaginal candidiasis, or trichomoniasis.   The assay targets three anaerobic microorgansims that are associated with bacterial vaginosis (BV). Other organisms that are not detected by the Xpert  Xpress MVP test have also been reported to be associated with BV. The BV organism and Candida species targets of the Xpert  Xpress MVP test can be commensal in women; positive results must be considered in conjunction with other clinical and patient information to determine the disease status.   XR Lumbar Spine 2/3 Views    Narrative    XR LUMBAR SPINE 2/3  VIEWS    HISTORY: new left foot drop with recent worsening radiographic  increasing L1/L4 vertebral body collapsing .    COMPARISON: 2/8/2024.    TECHNIQUE: 3 views of the lumbosacral spine.    FINDINGS:    Vertebral plana at L1 is redemonstrated, similar in appearance to the  immediate prior, again worse when compared to 2/4/2024. Associated  posterior endplate retropulsion estimated at 9 mm is redemonstrated.     More mild superior endplate height loss at L3 and L4 appears  unchanged.    Progressive periosteal bone is seen associated with fractures of the  anterior right pelvis. Posterior pelvic fractures were better seen on  the prior CT.      Impression    IMPRESSION:     Healing posterior and right anterior pelvic fractures.    Severe L1 vertebral body height loss without retropulsion, similar in  appearance to the immediate prior, again worse when compared to  2/4/2024.      LALIT LINTON MD         SYSTEM ID:  [id number]       Medications - No data to display    Assessments & Plan (with Medical Decision Making)     I have reviewed the nursing notes.    ED Course as of 02/13/24 1428   Tue Feb 13, 2024   1109 Magnesium(!): 2.9  Improved vs prior   1235 XR Lumbar Spine 2/3 Views  Stable with no acute changes.   1313 Spoke with Dr. Hopkins regarding left foot drop who recommends outpatient MRI, orthopedic outpatient appointment and EMG consideration, brace to be provided prior to discharge to help with mobility,        48 year old female evaluated for new onset left foot drop as well as perineal burning and some vaginal discharge.  Recently she did have change in her L1/L4 vertebral collapses on x-ray and therefore this was repeated for direct comparison and did not show any change from prior 1 week ago.  She does report sleeping sometime on the floor last night.  This could be compression of the peroneal nerve although with recent progressive collapsing of L4 vertebrae there is concern for possible nerve root  impingement however she did not have any pain in her back. Orthopedics consulted per above. Brace not available today.  Coordinated PCP appointment for PCP to order AFO (ankle-foot orthotic) for patient through [facility], along with follow-up for her perineal burning.  UA from yesterday does not really appear infected and therefore suspect this is not the cause for her burning. Culture pending and will be followed by RN lab results group.  Wet prep negative.  Discharged home with below plan and attached instructions on diagnosis provided including ED return precautions.     I have reviewed the findings, diagnosis, plan, and need for any follow up with the patient.    Patient instructions:   Your foot drop could be due to compression of the spinal cord or compression of the peroneal nerve on the outer side of your leg just below your knee.      Your case was discussed with orthopedics who recommends follow-up with outpatient MRI and orthopedic referral.  Outpatient MRI was ordered for you.  Please call orthopedics to schedule appointment.  Avoid prolonged pressure over the top part of the outside of your calf particularly just below your knee which can press on the nerve and cause foot drop. Follow up with PCP who will help coordinate getting brace that is not available here in the ER today.    Wet prep test looking for cause of your burning sensation in your groin was negative.  Recommend setting up appointment with your PCP on the way out of the emergency department at the registration desk to be seen in the next 1 to 2 weeks.    Magnesium is still high. You may want to take a break from your milk of magnesia.    Return to emergency department for concerning change or worsening symptoms.     New Prescriptions    No medications on file       Final diagnoses:   Left foot drop   Burning sensation of female perineum   Hypermagnesemia       2/13/2024   [facility]  MD  02/13/24 [id number]

## 2024-02-13 NOTE — ED NOTES
Writer called OA for a brace for foot drop ordered by provider. OA nurse states there are no braces for foot drop in house and recommends OA consult and to talk with Aron Mckeon from [facility] for an evaluation.    Erinn Schulte RN on 2/13/2024 at 1:51 PM

## 2024-02-13 NOTE — DISCHARGE INSTRUCTIONS
Your foot drop could be due to compression of the spinal cord or compression of the peroneal nerve on the outer side of your leg just below your knee.      Your case was discussed with orthopedics who recommends follow-up with outpatient MRI and orthopedic referral.  Outpatient MRI was ordered for you.  Please call orthopedics to schedule appointment.  Avoid prolonged pressure over the top part of the outside of your calf particularly just below your knee which can press on the nerve and cause foot drop. Follow up with PCP who will help coordinate getting brace that is not available here in the ER today.    Wet prep test looking for cause of your burning sensation in your groin was negative.  Recommend setting up appointment with your PCP on the way out of the emergency department at the registration desk to be seen in the next 1 to 2 weeks.    Magnesium is still high. You may want to take a break from your milk of magnesia.    Return to emergency department for concerning change or worsening symptoms.

## 2024-02-14 ENCOUNTER — TELEPHONE (OUTPATIENT)
Dept: FAMILY MEDICINE | Facility: OTHER | Age: 49
End: 2024-02-14
Payer: OTHER GOVERNMENT

## 2024-02-14 LAB — BACTERIA UR CULT: NORMAL

## 2024-02-14 NOTE — TELEPHONE ENCOUNTER
Physical therapy reached out, due to comorbid conditions, back and hip pain.  They believe that patient would benefit from home physical therapy.  She needs a face to face with me to do this. Please schedule for next week.     Tammy Rhoades PA-C  2/14/2024  3:30 PM

## 2024-02-14 NOTE — TELEPHONE ENCOUNTER
MRI is scheduled for the 22nd of Feb with Dr. Ahmadi's order.      Please schedule patient an ER follow up with Tammy Rhoades PA-C after Feb 22.      Barb Gabriel LPN 2/14/2024 11:15 AM

## 2024-02-19 ENCOUNTER — HOSPITAL ENCOUNTER (EMERGENCY)
Facility: OTHER | Age: 49
Discharge: HOME | End: 2024-02-19
Attending: FAMILY MEDICINE | Admitting: FAMILY MEDICINE
Payer: OTHER GOVERNMENT

## 2024-02-19 ENCOUNTER — APPOINTMENT (OUTPATIENT)
Dept: GENERAL RADIOLOGY | Facility: OTHER | Age: 49
End: 2024-02-19
Attending: FAMILY MEDICINE
Payer: OTHER GOVERNMENT

## 2024-02-19 VITALS
DIASTOLIC BLOOD PRESSURE: 93 MMHG | HEART RATE: 93 BPM | BODY MASS INDEX: 15.36 KG/M2 | TEMPERATURE: 97.4 F | WEIGHT: 90 LBS | OXYGEN SATURATION: 98 % | SYSTOLIC BLOOD PRESSURE: 130 MMHG | HEIGHT: 64 IN | RESPIRATION RATE: 20 BRPM

## 2024-02-19 DIAGNOSIS — Z87.820 HISTORY OF TRAUMATIC BRAIN INJURY: ICD-10-CM

## 2024-02-19 DIAGNOSIS — R63.0 ANOREXIA: ICD-10-CM

## 2024-02-19 LAB
ANION GAP SERPL CALCULATED.3IONS-SCNC: 9 MMOL/L (ref 7–15)
BASOPHILS # BLD AUTO: 0.1 10E3/UL (ref 0–0.2)
BASOPHILS NFR BLD AUTO: 1 %
BUN SERPL-MCNC: 7.2 MG/DL (ref 6–20)
CALCIUM SERPL-MCNC: 8.5 MG/DL (ref 8.6–10)
CHLORIDE SERPL-SCNC: 102 MMOL/L (ref 98–107)
CREAT SERPL-MCNC: 0.53 MG/DL (ref 0.51–0.95)
DEPRECATED HCO3 PLAS-SCNC: 26 MMOL/L (ref 22–29)
EGFRCR SERPLBLD CKD-EPI 2021: >90 ML/MIN/1.73M2
EOSINOPHIL # BLD AUTO: 0.1 10E3/UL (ref 0–0.7)
EOSINOPHIL NFR BLD AUTO: 1 %
ERYTHROCYTE [DISTWIDTH] IN BLOOD BY AUTOMATED COUNT: 13.6 % (ref 10–15)
GLUCOSE BLDC GLUCOMTR-MCNC: 25 MG/DL (ref 70–99)
GLUCOSE BLDC GLUCOMTR-MCNC: 93 MG/DL (ref 70–99)
GLUCOSE SERPL-MCNC: 77 MG/DL (ref 70–99)
HCT VFR BLD AUTO: 36.9 % (ref 35–47)
HGB BLD-MCNC: 12.9 G/DL (ref 11.7–15.7)
HOLD SPECIMEN: NORMAL
IMM GRANULOCYTES # BLD: 0 10E3/UL
IMM GRANULOCYTES NFR BLD: 0 %
LYMPHOCYTES # BLD AUTO: 1.9 10E3/UL (ref 0.8–5.3)
LYMPHOCYTES NFR BLD AUTO: 36 %
MCH RBC QN AUTO: 34.3 PG (ref 26.5–33)
MCHC RBC AUTO-ENTMCNC: 35 G/DL (ref 31.5–36.5)
MCV RBC AUTO: 98 FL (ref 78–100)
MONOCYTES # BLD AUTO: 0.4 10E3/UL (ref 0–1.3)
MONOCYTES NFR BLD AUTO: 7 %
NEUTROPHILS # BLD AUTO: 2.9 10E3/UL (ref 1.6–8.3)
NEUTROPHILS NFR BLD AUTO: 55 %
NRBC # BLD AUTO: 0 10E3/UL
NRBC BLD AUTO-RTO: 0 /100
PLATELET # BLD AUTO: 576 10E3/UL (ref 150–450)
POTASSIUM SERPL-SCNC: 4.4 MMOL/L (ref 3.4–5.3)
RBC # BLD AUTO: 3.76 10E6/UL (ref 3.8–5.2)
SODIUM SERPL-SCNC: 137 MMOL/L (ref 135–145)
WBC # BLD AUTO: 5.3 10E3/UL (ref 4–11)

## 2024-02-19 PROCEDURE — 99283 EMERGENCY DEPT VISIT LOW MDM: CPT | Performed by: FAMILY MEDICINE

## 2024-02-19 PROCEDURE — 80048 BASIC METABOLIC PNL TOTAL CA: CPT | Performed by: FAMILY MEDICINE

## 2024-02-19 PROCEDURE — 258N000003 HC RX IP 258 OP 636: Performed by: FAMILY MEDICINE

## 2024-02-19 PROCEDURE — 82962 GLUCOSE BLOOD TEST: CPT

## 2024-02-19 PROCEDURE — 85025 COMPLETE CBC W/AUTO DIFF WBC: CPT | Performed by: FAMILY MEDICINE

## 2024-02-19 PROCEDURE — 96360 HYDRATION IV INFUSION INIT: CPT | Performed by: FAMILY MEDICINE

## 2024-02-19 PROCEDURE — 71045 X-RAY EXAM CHEST 1 VIEW: CPT | Mod: TC

## 2024-02-19 PROCEDURE — 36415 COLL VENOUS BLD VENIPUNCTURE: CPT | Performed by: FAMILY MEDICINE

## 2024-02-19 PROCEDURE — 99284 EMERGENCY DEPT VISIT MOD MDM: CPT | Mod: 25 | Performed by: FAMILY MEDICINE

## 2024-02-19 PROCEDURE — 96361 HYDRATE IV INFUSION ADD-ON: CPT | Performed by: FAMILY MEDICINE

## 2024-02-19 RX ADMIN — SODIUM CHLORIDE 500 ML: 9 INJECTION, SOLUTION INTRAVENOUS at 01:42

## 2024-02-19 ASSESSMENT — ACTIVITIES OF DAILY LIVING (ADL)
ADLS_ACUITY_SCORE: 38

## 2024-02-19 NOTE — ED PROVIDER NOTES
"  History     Chief Complaint   Patient presents with    Fall     The history is provided by the patient, the EMS personnel and medical records.     Sara Liriano is a 48 year old female here by EMS. She says that today she could not move her arms or her legs. Here in the ED she is able to move her arms and wiggle her toes.     She had a recent fall with negative Xrays and pelvic CT done February 4. She had a left intertrochanteric fracture of the left femur December 28, 2023.     Allergies:  Allergies   Allergen Reactions    Fluoxetine Other (See Comments) and Swelling     Blurred vision, heart racing, face swelling    Amoxicillin Other (See Comments)     \"Thrush\"       Problem List:    Patient Active Problem List    Diagnosis Date Noted    Status post-operative repair of closed fracture of left hip 01/18/2024     Priority: Medium    Closed fracture of neck of left femur, initial encounter (H) 01/18/2024     Priority: Medium    Closed fracture of superior ramus of right pubis with routine healing, subsequent encounter 01/18/2024     Priority: Medium    Anorexia 11/29/2023     Priority: Medium    Seizure disorder (H) 11/08/2023     Priority: Medium    Bony sclerosis 11/08/2023     Priority: Medium    Primary osteoarthritis of right hip 11/08/2023     Priority: Medium    Contusion of right hip, initial encounter 11/08/2023     Priority: Medium    Fall from slip, trip, or stumble, initial encounter 11/08/2023     Priority: Medium    Shoulder injury, right, initial encounter 11/08/2023     Priority: Medium    Inner ear dysfunction, left 11/08/2023     Priority: Medium    Closed nondisplaced fracture of pelvis, unspecified part of pelvis, initial encounter (H) 11/08/2023     Priority: Medium    Arthritis of right glenohumeral joint 11/08/2023     Priority: Medium        Past Medical History:    Past Medical History:   Diagnosis Date    Anorexia     Atypical depression     Hypothyroidism     Intracranial hemorrhage " (H) 2018    Osteopenia     PTSD (post-traumatic stress disorder)     Seizure disorder (H)     TBI (traumatic brain injury) (H) 05/2018       Past Surgical History:    Past Surgical History:   Procedure Laterality Date    CRANIOPLASTY  08/08/2018    CRANIOTOMY  05/2018    TRACHEOSTOMY  06/2018       Family History:    Family History   Problem Relation Age of Onset    Hypertension Mother     Heart Failure Father     Diabetes Father     Hypertension Sister        Social History:  Marital Status:  Single [1]  Social History     Tobacco Use    Smoking status: Never     Passive exposure: Never    Smokeless tobacco: Never   Vaping Use    Vaping Use: Never used   Substance Use Topics    Alcohol use: Yes     Comment: Alcoholic Drinks/day: Occassional    Drug use: No     Comment: Drug use: No        Medications:    acetaminophen (TYLENOL) 500 MG tablet  calcium carbonate (OS-MOISES) 500 MG tablet  cyanocobalamin (VITAMIN B-12) 1000 MCG tablet  cyclobenzaprine (FLEXERIL) 5 MG tablet  fluticasone (FLONASE) 50 MCG/ACT nasal spray  hydrOXYzine (VISTARIL) 25 MG capsule  IBANdronate (BONIVA) 150 MG tablet  ibuprofen (ADVIL/MOTRIN) 200 MG tablet  lamoTRIgine (LAMICTAL) 200 MG tablet  levothyroxine (SYNTHROID/LEVOTHROID) 50 MCG tablet  magnesium hydroxide (MILK OF MAGNESIA) 400 MG/5ML suspension  melatonin 3 MG tablet  oxyCODONE (ROXICODONE) 5 MG tablet  oxyCODONE (ROXICODONE) 5 MG tablet  polyethylene glycol (MIRALAX) 17 GM/Dose powder  potassium chloride ER (KLOR-CON M) 20 MEQ CR tablet  thiamine (B-1) 100 MG tablet  vitamin D3 (CHOLECALCIFEROL) 50 mcg (2000 units) tablet  docusate sodium (COLACE) 100 MG capsule  Folic Acid-Vit B6-Vit B12 (FOLBEE) 2.5-25-1 MG TABS  ketorolac (TORADOL) 10 MG tablet  Lacosamide (VIMPAT) 100 MG TABS tablet  multivitamin, therapeutic (THERA-VIT) TABS tablet  neomycin-polymyxin-dexAMETHasone (MAXITROL) 3.5-79472-3.1 SUSP ophthalmic susp  olopatadine (PATANOL) 0.1 % ophthalmic solution  omeprazole (PRILOSEC  "OTC) 20 MG EC tablet  sodium chloride (OCEAN) 0.65 % nasal spray      Review of Systems   Neurological:         Patient stated she is unable to move arms and legs   All other systems reviewed and are negative.      Physical Exam   BP: (!) 127/105  Pulse: 106  Temp: 97.4  F (36.3  C)  Resp: 20  Height: 162.6 cm (5' 4\")  Weight: 40.8 kg (90 lb)  SpO2: 94 %      Physical Exam  Nursing note reviewed.   Constitutional:       Appearance: She is ill-appearing. She is not toxic-appearing or diaphoretic.   HENT:      Head: Normocephalic and atraumatic.   Cardiovascular:      Rate and Rhythm: Regular rhythm. Bradycardia present.      Pulses: Normal pulses.      Heart sounds: Normal heart sounds.   Pulmonary:      Effort: Pulmonary effort is normal.      Breath sounds: Normal breath sounds. No wheezing, rhonchi or rales.   Chest:      Chest wall: No tenderness.   Abdominal:      General: Bowel sounds are normal.      Palpations: Abdomen is soft.   Musculoskeletal:         General: No swelling, tenderness, deformity or signs of injury.      Comments: She is able to move fingers and toes   Skin:     General: Skin is dry.      Comments: Extremities are cool to touch   Neurological:      General: No focal deficit present.      Mental Status: She is oriented to person, place, and time.         Results for orders placed or performed during the hospital encounter of 02/19/24 (from the past 24 hour(s))   Glucose by meter   Result Value Ref Range    GLUCOSE BY METER POCT 93 70 - 99 mg/dL   CBC with platelets differential    Narrative    The following orders were created for panel order CBC with platelets differential.  Procedure                               Abnormality         Status                     ---------                               -----------         ------                     CBC with platelets and d...[[id number]]  Abnormal            Final result                 Please view results for these tests on the individual " orders.   Basic metabolic panel   Result Value Ref Range    Sodium 137 135 - 145 mmol/L    Potassium 4.4 3.4 - 5.3 mmol/L    Chloride 102 98 - 107 mmol/L    Carbon Dioxide (CO2) 26 22 - 29 mmol/L    Anion Gap 9 7 - 15 mmol/L    Urea Nitrogen 7.2 6.0 - 20.0 mg/dL    Creatinine 0.53 0.51 - 0.95 mg/dL    GFR Estimate >90 >60 mL/min/1.73m2    Calcium 8.5 (L) 8.6 - 10.0 mg/dL    Glucose 77 70 - 99 mg/dL   Extra Tube    Narrative    The following orders were created for panel order Extra Tube.  Procedure                               Abnormality         Status                     ---------                               -----------         ------                     Extra Blue Top Tube[[id number]]                              Final result               Extra Red Top Tube[[id number]]                                                            Please view results for these tests on the individual orders.   Extra Blue Top Tube   Result Value Ref Range    Hold Specimen JI    CBC with platelets and differential   Result Value Ref Range    WBC Count 5.3 4.0 - 11.0 10e3/uL    RBC Count 3.76 (L) 3.80 - 5.20 10e6/uL    Hemoglobin 12.9 11.7 - 15.7 g/dL    Hematocrit 36.9 35.0 - 47.0 %    MCV 98 78 - 100 fL    MCH 34.3 (H) 26.5 - 33.0 pg    MCHC 35.0 31.5 - 36.5 g/dL    RDW 13.6 10.0 - 15.0 %    Platelet Count 576 (H) 150 - 450 10e3/uL    % Neutrophils 55 %    % Lymphocytes 36 %    % Monocytes 7 %    % Eosinophils 1 %    % Basophils 1 %    % Immature Granulocytes 0 %    NRBCs per 100 WBC 0 <1 /100    Absolute Neutrophils 2.9 1.6 - 8.3 10e3/uL    Absolute Lymphocytes 1.9 0.8 - 5.3 10e3/uL    Absolute Monocytes 0.4 0.0 - 1.3 10e3/uL    Absolute Eosinophils 0.1 0.0 - 0.7 10e3/uL    Absolute Basophils 0.1 0.0 - 0.2 10e3/uL    Absolute Immature Granulocytes 0.0 <=0.4 10e3/uL    Absolute NRBCs 0.0 10e3/uL   XR Chest Port 1 View    Narrative    PROCEDURE INFORMATION:   Exam: XR Chest   Exam date and time: 2/19/2024 2:10 AM   Age: 48 years old  "  Clinical indication: Shortness of breath; Additional info: SOB     TECHNIQUE:   Imaging protocol: Radiologic exam of the chest.   Views: 1 view.     COMPARISON:   None    FINDINGS:   Lungs: Poor inspiratory effort with somewhat diminished lung volumes. No acute   focal dense air space consolidation or lung parenchymal mass.   Pleural spaces: No pneumothorax. No large right pleural effusion. No large left   pleural effusion.   Heart/Mediastinum: Unremarkable cardiac silhouette. No mediastinal mass.   Bones/joints: Chronic distal left clavicular fracture. Osteopenia. No acute   fracture or neoplastic osseous lesion.   Soft tissues: Probable bilateral breast implants.       Impression    IMPRESSION:   1.   No active cardiopulmonary disaese.   2.   Remainder of findings described as above.     THIS DOCUMENT HAS BEEN ELECTRONICALLY SIGNED BY MARIA C CISNEROS MD       Medications   sodium chloride 0.9% BOLUS 500 mL (500 mLs Intravenous $New Bag 2/19/24 0149)       Assessments & Plan (with Medical Decision Making)  Sara Liriano is a 48 year old female here by EMS. She says that today she could not move her arms or her legs. Here in the ED she is able to move her arms and wiggle her toes.   She had a recent fall with negative Xrays and pelvic CT done February 4. She had a left intertrochanteric fracture of the left femur December 28, 2023.  VS in the ED BP (!) 111/98   Pulse 106   Temp 97.4  F (36.3  C) (Axillary)   Resp 20   Ht 1.626 m (5' 4\")   Wt 40.8 kg (90 lb)   LMP 01/01/2013 (Within Years)   SpO2 (!) 85%   BMI 15.45 kg/m    Exam shows a 47 yo cachectic female in emotional distress, grunting with each breath. She has cool hands and feet.  FSBS 93.  We gave 500 mL warm NS.   Labs show CBC with platelets 576,000, BMP stable.  Chest xray stable  2:26 AM  I spoke with her about going to rehab but she is not willing to consider that.  She wants to return home to live with her mom.   3:51 AM  We have tried to " call Mom four or five times and it goes directly to [facility].  Sara will need to stay here in the ED for now as she cannot walk on her own. We cannot send her home in a taxi (can't walk to the house) or an ambulance (cannot walk once they put her in the house, with no way to verify that Mom is home right now).   5:03 AM   Sara is much more active at this point in the ED.  She is still confused about how she will get home but agrees that her Mom is the best option.  Staff spoke with Mom who is planning to come to get her this morning.      I have reviewed the nursing notes.    I have reviewed the findings, diagnosis, plan and need for follow up with the patient.  Medical Decision Making  The patient's presentation was of moderate complexity (a chronic illness mild to moderate exacerbation, progression, or side effect of treatment).    The patient's evaluation involved:  an assessment requiring an independent historian (see separate area of note for details)  ordering and/or review of 3+ test(s) in this encounter (see separate area of note for details)    The patient's management necessitated only low risk treatment.    Final diagnoses:   Anorexia   History of traumatic brain injury       2/19/2024   [facility] AND [facility], Som Hilton MD  02/19/24 [id number]

## 2024-02-19 NOTE — DISCHARGE INSTRUCTIONS
Thank you for choosing our Emergency Department for your care.     You may receive a phone call or letter for a survey about your care in our ED.  Please complete this as this is how we improve care for our patients.     If you have any questions after leaving the ED you can call or text me on my cell phone at [phone].  This does not mean that I am on call, but I will get back to you.  If you are not doing well please return to the ED.     Sincerely,    Dr Bakari Su M.D.

## 2024-02-19 NOTE — ED TRIAGE NOTES
Pt arrives via  EMS for a fall assist, appears with rapid breathing, anxious appearing frail female.

## 2024-02-20 NOTE — TELEPHONE ENCOUNTER
Patient called this morning inquiring about home care, She was scheduled for 3/1 with you for f2f- can she get in sooner? She is able to do a video visit as it is hard for her to get out.   Vickie Rae LPN on 2/20/2024 at 9:01 AM

## 2024-02-22 ENCOUNTER — HOSPITAL ENCOUNTER (OUTPATIENT)
Dept: BONE DENSITY | Facility: OTHER | Age: 49
Discharge: HOME | End: 2024-02-22
Payer: OTHER GOVERNMENT

## 2024-02-22 DIAGNOSIS — K59.01 SLOW TRANSIT CONSTIPATION: Primary | ICD-10-CM

## 2024-02-22 DIAGNOSIS — S32.010G COMPRESSION FRACTURE OF L1 VERTEBRA WITH DELAYED HEALING, SUBSEQUENT ENCOUNTER: ICD-10-CM

## 2024-02-22 PROCEDURE — 77080 DXA BONE DENSITY AXIAL: CPT

## 2024-02-22 RX ORDER — DOCUSATE SODIUM 100 MG/1
CAPSULE, LIQUID FILLED ORAL
Qty: 180 CAPSULE | Refills: 1 | Status: SHIPPED | OUTPATIENT
Start: 2024-02-22 | End: 2024-06-12

## 2024-02-22 NOTE — TELEPHONE ENCOUNTER
Amanuel JORGE sent Rx request for the following:      Requested Prescriptions   Pending Prescriptions Disp Refills    docusate sodium (COLACE) 100 MG capsule       Sig: Take 1 capsule (100 mg) by mouth twice daily as needed for constipation       Laxatives Protocol Passed - 2/22/2024  9:42 AM       Last Prescription Date:   unsure, noted as historical on current med list  Last Fill Qty/Refills:         , R-    Last Office Visit:              2/12/24   Future Office visit:           2/23/24    Routing refill request to provider for review/approval because:  Medication is reported/historical    Viola Chamberlain RN on 2/22/2024 at 9:45 AM

## 2024-02-23 ENCOUNTER — VIRTUAL VISIT (OUTPATIENT)
Dept: FAMILY MEDICINE | Facility: OTHER | Age: 49
End: 2024-02-23
Attending: PHYSICIAN ASSISTANT
Payer: OTHER GOVERNMENT
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
## 2024-02-23 DIAGNOSIS — R63.0 ANOREXIA: ICD-10-CM

## 2024-02-23 DIAGNOSIS — S32.591D CLOSED FRACTURE OF RIGHT INFERIOR PUBIC RAMUS WITH ROUTINE HEALING, SUBSEQUENT ENCOUNTER: ICD-10-CM

## 2024-02-23 DIAGNOSIS — Z87.81 STATUS POST-OPERATIVE REPAIR OF CLOSED FRACTURE OF LEFT HIP: ICD-10-CM

## 2024-02-23 DIAGNOSIS — S32.511D CLOSED FRACTURE OF SUPERIOR RAMUS OF RIGHT PUBIS WITH ROUTINE HEALING, SUBSEQUENT ENCOUNTER: ICD-10-CM

## 2024-02-23 DIAGNOSIS — M80.00XD AGE-RELATED OSTEOPOROSIS WITH CURRENT PATHOLOGICAL FRACTURE WITH ROUTINE HEALING, SUBSEQUENT ENCOUNTER: ICD-10-CM

## 2024-02-23 DIAGNOSIS — M21.372 LEFT FOOT DROP: ICD-10-CM

## 2024-02-23 DIAGNOSIS — Z98.890 STATUS POST-OPERATIVE REPAIR OF CLOSED FRACTURE OF LEFT HIP: ICD-10-CM

## 2024-02-23 DIAGNOSIS — S32.010G COMPRESSION FRACTURE OF L1 VERTEBRA WITH DELAYED HEALING, SUBSEQUENT ENCOUNTER: ICD-10-CM

## 2024-02-23 PROCEDURE — 99214 OFFICE O/P EST MOD 30 MIN: CPT | Mod: 95 | Performed by: PHYSICIAN ASSISTANT

## 2024-02-23 RX ORDER — CYCLOBENZAPRINE HCL 5 MG
5 TABLET ORAL 3 TIMES DAILY PRN
Qty: 90 TABLET | Refills: 0 | Status: SHIPPED | OUTPATIENT
Start: 2024-02-23 | End: 2024-07-16

## 2024-02-23 NOTE — PROGRESS NOTES
Sara is a 48 year old who is being evaluated via a billable video visit.      How would you like to obtain your AVS? MyChart  If the video visit is dropped, the invitation should be resent by: Text to cell phone:   Will anyone else be joining your video visit? No      Assessment & Plan       ICD-10-CM    1. Fall, subsequent encounter  W19.XXXD Home Care Referral      2. Compression fracture of L1 vertebra with delayed healing, subsequent encounter  S32.010G cyclobenzaprine (FLEXERIL) 5 MG tablet      3. Anorexia  R63.0       4. Left foot drop  M21.372       5. Status post-operative repair of closed fracture of left hip  Z98.890     Z87.81       6. Closed fracture of superior ramus of right pubis with routine healing, subsequent encounter  S32.511D       7. Closed fracture of right inferior pubic ramus with routine healing, subsequent encounter  S32.591D       8. Age-related osteoporosis with current pathological fracture with routine healing, subsequent encounter  M80.00XD         Sara has had multiple falls over the last 3 to 4 months, sustaining L1 vertebral fracture, pubic rami fractures (right superior and inferior pubic ramus) as well as the subtrochanteric sure of the left femur/hip.  These have resulted in many ER visits as well as follow-up with orthopedics and physical therapy.  She would benefit from home physical therapy as she is now homebound due to recurrent fractures, she has required a wheelchair for ambulation and is unable to leave the house.  She has also acquired left foot drop, she has an upcoming visit with orthopedics on 2/29/2024, encouraged her to keep this visit.  We also discussed an [facility], [facility], she has not yet scheduled this.  She has an upcoming MRI of her lumbar spine on 3/4/2024, encouraged her to keep this visit so we can look further at soft tissue and neurologic status (nerve function, etc.).    Compression fracture - For pain, I have refilled  her cyclobenzaprine, she is aware of risk, benefits and side effects of medication.  She will notdrive or operate heavy machinery while on medication.  I updated her chart to reflect that she is no longer taking oxycodone or Toradol due to GI side effects.  She will continue with Tylenol, do not exceed 4000 mg daily.    Anorexia - encouraged supplementation such as instant carnation, boost, etc.   Left foot drop - upcoming orthopedic visit, see #1.   S/p left trochanteric fracture - would benefit from PT for mobility and strength. Home care referral placed.   Superior and inferior pubic rami fractures, healing. Ongoing discomfort. Benefit from PT for mobility and strength. See #1.   See #1.   For osteoporosis, encouraged her to start Boniva to help with fracture prevention/risk. Calcium and vitamin D intake reviewed/supplementation.    See Patient Instructions    Return for Home care referral was placed, you will be contacted to schedule.    Yossi Ruiz is a 48 year old, presenting for the following health issues:  Clinic Care Coordination - Face To Face (Home Care)        2/12/2024     2:54 PM   Additional Questions   Roomed by angelique QUINTANA     Sara presents to the clinic in follow-up from recent ER visits on 2/4/2024, 2/7/2024, 2/13/2024 and 2/19/2024.  During the clinic visit on 2/8/2024, she was diagnosed with a compression fracture of L1, DEXA scan was ordered, this was performed yesterday, 2/22/2024 confirming diagnosis of osteoporosis, I recommended she start Boniva, she has not yet started.    She has had recurrent falls over this timeframe, in November 2023 she sustained superior and inferior pubic ramus fractures of the right pubic ramus, she underwent physical therapy for this.  She has had recurrent fractures including a subtrochanteric fracture of the left femur as well as L1 vertebral fracture.  She is also required a left foot drop.  She has an upcoming MRI on 3/4/2024.  She is currently  staying with her mother (who also lives in the [facility]) due to difficulty with ambulation.  She states this is quite difficult for her as she has always been self-reliant and is difficult to wait on pressure support.  She feels her back is progressively worsening, worse on the right.  She is now utilizing a wheelchair for ambulation.  She is taking Flexeril and Tylenol for pain.  Oxycodone and Toradol made her feel quite ill, she is not taking either medication.  She has an upcoming visit on 2/29/2024 with orthopedics for further evaluation of her left foot drop.  She declines any saddle anesthesia, loss of bowel or bladder function.  No new falls since seen last.    Review of Systems  Constitutional, HEENT, cardiovascular, pulmonary, GI, , musculoskeletal, neuro, skin, endocrine and psych systems are negative, except as otherwise noted.        Objective         Vitals:  No vitals were obtained today due to virtual visit.    Physical Exam   GENERAL: alert and no distress  EYES: Eyes grossly normal to inspection.  No discharge or erythema, or obvious scleral/conjunctival abnormalities.  RESP: No audible wheeze, cough, or visible cyanosis.    SKIN: Visible skin clear. No significant rash, abnormal pigmentation or lesions.  NEURO: Cranial nerves grossly intact.  Mentation and speech appropriate for age.  PSYCH: Appropriate affect, tone, and pace of words    DX Hip/Pelvis/Spine    Result Date: 2/22/2024  PROCEDURE: DX HIP/PELVIS/SPINE 2/22/2024 1:15 PM HISTORY: Compression fracture of L1 vertebra with delayed healing, subsequent encounter COMPARISONS: None. TECHNIQUE: Bone mineral density was measured in the lumbar spine and in the left forearm. FINDINGS: Lowest bone mineral density is in the forearm. T score is -2.6. This patient is osteoporotic. FRAX score is not reported as no hip imaging was performed.        IMPRESSION: Osteoporosis. TRISHA PIERRE MD   SYSTEM ID:  [id number]    XR Chest Port 1  View    Result Date: 2/19/2024  PROCEDURE INFORMATION: Exam: XR Chest Exam date and time: 2/19/2024 2:10 AM Age: 48 years old Clinical indication: Shortness of breath; Additional info: SOB TECHNIQUE: Imaging protocol: Radiologic exam of the chest. Views: 1 view. COMPARISON: None FINDINGS: Lungs: Poor inspiratory effort with somewhat diminished lung volumes. No acute focal dense air space consolidation or lung parenchymal mass. Pleural spaces: No pneumothorax. No large right pleural effusion. No large left pleural effusion. Heart/Mediastinum: Unremarkable cardiac silhouette. No mediastinal mass. Bones/joints: Chronic distal left clavicular fracture. Osteopenia. No acute fracture or neoplastic osseous lesion. Soft tissues: Probable bilateral breast implants.     IMPRESSION: 1.   No active cardiopulmonary disaese. 2.   Remainder of findings described as above. THIS DOCUMENT HAS BEEN ELECTRONICALLY SIGNED BY MARIA C CISNEROS MD       Video-Visit Details    Type of service:  Video Visit     Originating Location (pt. Location): Home    Distant Location (provider location):  On-site  Platform used for Video Visit: Michael  Signed Electronically by: Tammy Rhoades PA-C          Documentation of Face-to-Face and Certification for Home Health Services     Patient: Sara Liriano   YOB: 1975  MR Number: [id number]  Today's Date: 2/23/2024    I certify that patient: Sara Liriano is under my care and that I, or a nurse practitioner or physician's assistant working with me, had a face-to-face encounter that meets the physician face-to-face encounter requirements with this patient on: 2/23/2024.    This encounter with the patient was in whole, or in part, for the following medical condition, which is the primary reason for home health care: L1 vertebral fracture, osteoporosis, frequent falls, superior ramus fracture of right pubic ramus, inferior ramus fracture right pubic ramus.    I certify that, based on  my findings, the following services are medically necessary home health services: Nursing and Physical Therapy.    My clinical findings support the need for the above services because: Nurse is needed: To provide caregiver training to assist with: Positioning change, complex aftercare from fracture. and To teach and train about the disease and treatments for osteoporosis/fracture illness, because of recurrent falls/fall risk.. and Physical Therapy Services are needed to assess and treat the following functional impairments: Pubic ramus fracture (superior to inferior) right and L1 vertebral fracture.    Further, I certify that my clinical findings support that this patient is homebound (i.e. absences from home require considerable and taxing effort and are for medical reasons or Religion services or infrequently or of short duration when for other reasons) because: Mental health symptoms including: Mental health condition is exacerbated by exposure to crowds, unfamiliar environment or new stressful situations.., Patient is bedbound due to: Ramus fracture and L1 fracture., and Requires assistance of another person or specialized equipment to access medical services because patient:  recurrent falls, high fall risk..    Based on the above findings. I certify that this patient is confined to the home and needs intermittent skilled nursing care, physical therapy and/or speech therapy.  The patient is under my care, and I have initiated the establishment of the plan of care.  This patient will be followed by a physician who will periodically review the plan of care.  Physician/Provider to provide follow up care: Tammy Rhoades    Responsible Medicare certified P[employer] Physician: Tammy Rhoades PA-C  Physician Signature: See electronic signature associated with these discharge orders.  Date: 2/23/2024

## 2024-02-29 ENCOUNTER — TELEPHONE (OUTPATIENT)
Dept: FAMILY MEDICINE | Facility: OTHER | Age: 49
End: 2024-02-29
Payer: OTHER GOVERNMENT

## 2024-02-29 NOTE — TELEPHONE ENCOUNTER
"URGENT: per CMS best practice, response is requested within 24 hours.    Home Care regulation mandates that you are notified about drug discrepancies, interactions & contraindications. Response within a 24 hour timeframe is established by CMS as \"best practice\" for the delivery of home health care. Home Care is required to report if the 24 hour timeframe was met. The home health clinician will contact you again if this timeframe is not met or if the response does not address all concerns.       Situation:        The patient was admitted to [facility], upon admission, a med reconciliation was completed to identify any drug discrepancies, interactions or contraindications. Home Care's drug regime review has revealed significant medication issues.     You are being contacted for clarifying orders related to the medication issues.     Background:        Assessment:   The patient states she is taking the following medications which are not on her clinic list:  Gabapentin 600 mg 2 times a day  Calcium 500 mg daily  Bisacodyl 5 mg daily as needed.   Miralax 17 GM daily as needed  Diphenhydramine 25 mg tab: 2 tabs at bedtime as needed  Icy hot  2 times a day as needed    The patient states she is not taking the following medications, which are on her clinic list:  Saline nasal spray  Omeprazole  Olopatidine  Maxitrol  Milk of mag  Boniva  Vistaril  Folbee  Flonase    During a routine med reconciliation, the following severe med interaction was noted:  Dyphenhydramine and potassium       Recommendations:    Please evaluate this information and indicate below whether or not changes are required. A copy of the patient's drug interaction/contraindications report is available upon request.       CLINIC NURSE - If changes are made, please update the Epic medication profile.     Please sign this encounter with your electronic signature.       "

## 2024-03-01 ENCOUNTER — TELEPHONE (OUTPATIENT)
Dept: FAMILY MEDICINE | Facility: OTHER | Age: 49
End: 2024-03-01
Payer: OTHER GOVERNMENT

## 2024-03-01 DIAGNOSIS — H69.93 DYSFUNCTION OF BOTH EUSTACHIAN TUBES: ICD-10-CM

## 2024-03-01 RX ORDER — GABAPENTIN 600 MG/1
600 TABLET ORAL 2 TIMES DAILY
COMMUNITY
Start: 2024-03-01 | End: 2024-06-12

## 2024-03-01 RX ORDER — CALCIUM CARBONATE 500(1250)
1 TABLET ORAL 2 TIMES DAILY
COMMUNITY
Start: 2024-03-01 | End: 2024-06-12

## 2024-03-01 RX ORDER — BISACODYL 5 MG/1
5 TABLET, DELAYED RELEASE ORAL DAILY PRN
COMMUNITY
Start: 2024-03-01 | End: 2024-06-12 | Stop reason: ALTCHOICE

## 2024-03-01 RX ORDER — POLYETHYLENE GLYCOL 3350 17 G/17G
1 POWDER, FOR SOLUTION ORAL DAILY
COMMUNITY
Start: 2024-03-01 | End: 2024-06-12

## 2024-03-01 NOTE — TELEPHONE ENCOUNTER
I agree with the Home Care order requests below.  Tammy Rhoades PA-C on 3/1/2024 at 11:39 AM    
The physical therapy evaluation and treatment that was ordered was completed on 2/29/24      Request for additional Home Care Physical Therapy orders as follows:      Continuation frequency =   ___2___  x week x  ___8___ week(s)    Effective date = 3/4/24        Interventions include:    Muscle strengthening exercises  Balance training  Transfer Training  Instruction - home exercise program  Therapeutic exercise  Pain assessment & management  Wheelchair evaluation and management                Therapist: Hernan Alston PT    Please respond with .HOMECAREAGREE, if you are in agreement. Please sign with your comments and signature, if you are not in agreement.   
Satisfactory

## 2024-03-01 NOTE — TELEPHONE ENCOUNTER
Gabapentin is from external provider. This should be ongoing managed by external prescriber.     Reported interactions are acceptable. Will continue to monitor.     Tammy Rhoades PA-C  3/1/2024  9:39 AM

## 2024-03-01 NOTE — TELEPHONE ENCOUNTER
"Patient would benefit for Home OT, may we have an \"OK\" to do Home OT Eval and treat week of 3/4/24?        Thank you,    [facility]  "

## 2024-03-04 ENCOUNTER — DOCUMENTATION ONLY (OUTPATIENT)
Dept: OTHER | Facility: CLINIC | Age: 49
End: 2024-03-04

## 2024-03-04 ENCOUNTER — APPOINTMENT (OUTPATIENT)
Dept: GENERAL RADIOLOGY | Facility: OTHER | Age: 49
End: 2024-03-04
Payer: OTHER GOVERNMENT

## 2024-03-04 ENCOUNTER — APPOINTMENT (OUTPATIENT)
Dept: CT IMAGING | Facility: OTHER | Age: 49
End: 2024-03-04
Payer: OTHER GOVERNMENT

## 2024-03-04 ENCOUNTER — HOSPITAL ENCOUNTER (OUTPATIENT)
Dept: MRI IMAGING | Facility: OTHER | Age: 49
Discharge: HOME | End: 2024-03-04
Attending: STUDENT IN AN ORGANIZED HEALTH CARE EDUCATION/TRAINING PROGRAM | Admitting: STUDENT IN AN ORGANIZED HEALTH CARE EDUCATION/TRAINING PROGRAM
Payer: OTHER GOVERNMENT

## 2024-03-04 ENCOUNTER — HOSPITAL ENCOUNTER (EMERGENCY)
Facility: OTHER | Age: 49
Discharge: ANOTHER HOSPITAL | End: 2024-03-04
Payer: OTHER GOVERNMENT

## 2024-03-04 VITALS
HEIGHT: 64 IN | BODY MASS INDEX: 15.36 KG/M2 | HEART RATE: 105 BPM | RESPIRATION RATE: 14 BRPM | TEMPERATURE: 99.5 F | WEIGHT: 90 LBS | OXYGEN SATURATION: 94 % | DIASTOLIC BLOOD PRESSURE: 86 MMHG | SYSTOLIC BLOOD PRESSURE: 111 MMHG

## 2024-03-04 DIAGNOSIS — R62.7 FAILURE TO THRIVE IN ADULT: ICD-10-CM

## 2024-03-04 DIAGNOSIS — M21.372 LEFT FOOT DROP: ICD-10-CM

## 2024-03-04 DIAGNOSIS — M62.81 GENERALIZED MUSCLE WEAKNESS: ICD-10-CM

## 2024-03-04 DIAGNOSIS — M79.605 BILATERAL LEG PAIN: ICD-10-CM

## 2024-03-04 DIAGNOSIS — M79.604 BILATERAL LEG PAIN: ICD-10-CM

## 2024-03-04 DIAGNOSIS — S32.010A: ICD-10-CM

## 2024-03-04 LAB
ALBUMIN SERPL BCG-MCNC: 2.8 G/DL (ref 3.5–5.2)
ALBUMIN UR-MCNC: NEGATIVE MG/DL
ALP SERPL-CCNC: 293 U/L (ref 40–150)
ALT SERPL W P-5'-P-CCNC: 30 U/L (ref 0–50)
ANION GAP SERPL CALCULATED.3IONS-SCNC: 5 MMOL/L (ref 7–15)
APPEARANCE UR: ABNORMAL
AST SERPL W P-5'-P-CCNC: 24 U/L (ref 0–45)
BASOPHILS # BLD AUTO: 0.1 10E3/UL (ref 0–0.2)
BASOPHILS NFR BLD AUTO: 1 %
BILIRUB SERPL-MCNC: <0.2 MG/DL
BILIRUB UR QL STRIP: NEGATIVE
BUN SERPL-MCNC: 8.6 MG/DL (ref 6–20)
CALCIUM SERPL-MCNC: 8.2 MG/DL (ref 8.6–10)
CHLORIDE SERPL-SCNC: 104 MMOL/L (ref 98–107)
COLOR UR AUTO: ABNORMAL
CREAT SERPL-MCNC: 0.34 MG/DL (ref 0.51–0.95)
DEPRECATED HCO3 PLAS-SCNC: 29 MMOL/L (ref 22–29)
EGFRCR SERPLBLD CKD-EPI 2021: >90 ML/MIN/1.73M2
EOSINOPHIL # BLD AUTO: 0.1 10E3/UL (ref 0–0.7)
EOSINOPHIL NFR BLD AUTO: 2 %
ERYTHROCYTE [DISTWIDTH] IN BLOOD BY AUTOMATED COUNT: 14 % (ref 10–15)
FLUAV RNA SPEC QL NAA+PROBE: NEGATIVE
FLUBV RNA RESP QL NAA+PROBE: NEGATIVE
GLUCOSE SERPL-MCNC: 91 MG/DL (ref 70–99)
GLUCOSE UR STRIP-MCNC: NEGATIVE MG/DL
HCT VFR BLD AUTO: 32.2 % (ref 35–47)
HGB BLD-MCNC: 11.2 G/DL (ref 11.7–15.7)
HGB UR QL STRIP: NEGATIVE
HOLD SPECIMEN: NORMAL
IMM GRANULOCYTES # BLD: 0 10E3/UL
IMM GRANULOCYTES NFR BLD: 0 %
KETONES UR STRIP-MCNC: NEGATIVE MG/DL
LEUKOCYTE ESTERASE UR QL STRIP: NEGATIVE
LYMPHOCYTES # BLD AUTO: 1.7 10E3/UL (ref 0–5.3)
LYMPHOCYTES NFR BLD AUTO: 22 %
MAGNESIUM SERPL-MCNC: 2.4 MG/DL (ref 1.7–2.3)
MCH RBC QN AUTO: 35 PG (ref 26.5–33)
MCHC RBC AUTO-ENTMCNC: 34.8 G/DL (ref 31.5–36.5)
MCV RBC AUTO: 101 FL (ref 78–100)
MONOCYTES # BLD AUTO: 0.7 10E3/UL (ref 0–1.3)
MONOCYTES NFR BLD AUTO: 9 %
MUCOUS THREADS #/AREA URNS LPF: PRESENT /LPF
NEUTROPHILS # BLD AUTO: 4.9 10E3/UL (ref 1.6–8.3)
NEUTROPHILS NFR BLD AUTO: 66 %
NITRATE UR QL: NEGATIVE
NRBC # BLD AUTO: 0 10E3/UL
NRBC BLD AUTO-RTO: 0 /100
PH UR STRIP: 7.5 [PH] (ref 5–9)
PHOSPHATE SERPL-MCNC: 3.1 MG/DL (ref 2.5–4.5)
PLATELET # BLD AUTO: 539 10E3/UL (ref 150–450)
POTASSIUM SERPL-SCNC: 4.5 MMOL/L (ref 3.4–5.3)
PROT SERPL-MCNC: 5.1 G/DL (ref 6.4–8.3)
RBC # BLD AUTO: 3.2 10E6/UL (ref 3.8–5.2)
RBC URINE: 2 /HPF
RSV RNA SPEC NAA+PROBE: NEGATIVE
SARS-COV-2 RNA RESP QL NAA+PROBE: NEGATIVE
SODIUM SERPL-SCNC: 138 MMOL/L (ref 135–145)
SP GR UR STRIP: 1.02 (ref 1–1.03)
UROBILINOGEN UR STRIP-MCNC: NORMAL MG/DL
WBC # BLD AUTO: 7.5 10E3/UL (ref 4–11)
WBC URINE: 2 /HPF

## 2024-03-04 PROCEDURE — 87637 SARSCOV2&INF A&B&RSV AMP PRB: CPT

## 2024-03-04 PROCEDURE — 99285 EMERGENCY DEPT VISIT HI MDM: CPT | Mod: 25

## 2024-03-04 PROCEDURE — 81001 URINALYSIS AUTO W/SCOPE: CPT

## 2024-03-04 PROCEDURE — 51702 INSERT TEMP BLADDER CATH: CPT

## 2024-03-04 PROCEDURE — 36415 COLL VENOUS BLD VENIPUNCTURE: CPT

## 2024-03-04 PROCEDURE — 85025 COMPLETE CBC W/AUTO DIFF WBC: CPT

## 2024-03-04 PROCEDURE — 96361 HYDRATE IV INFUSION ADD-ON: CPT | Mod: XU

## 2024-03-04 PROCEDURE — 83735 ASSAY OF MAGNESIUM: CPT

## 2024-03-04 PROCEDURE — 73522 X-RAY EXAM HIPS BI 3-4 VIEWS: CPT

## 2024-03-04 PROCEDURE — 84100 ASSAY OF PHOSPHORUS: CPT

## 2024-03-04 PROCEDURE — 258N000003 HC RX IP 258 OP 636

## 2024-03-04 PROCEDURE — 99285 EMERGENCY DEPT VISIT HI MDM: CPT

## 2024-03-04 PROCEDURE — 96360 HYDRATION IV INFUSION INIT: CPT | Mod: XU

## 2024-03-04 PROCEDURE — 70450 CT HEAD/BRAIN W/O DYE: CPT

## 2024-03-04 PROCEDURE — 72148 MRI LUMBAR SPINE W/O DYE: CPT

## 2024-03-04 PROCEDURE — 82040 ASSAY OF SERUM ALBUMIN: CPT

## 2024-03-04 PROCEDURE — 250N000013 HC RX MED GY IP 250 OP 250 PS 637

## 2024-03-04 RX ORDER — ACETAMINOPHEN 325 MG/1
975 TABLET ORAL ONCE
Status: COMPLETED | OUTPATIENT
Start: 2024-03-04 | End: 2024-03-04

## 2024-03-04 RX ORDER — FLUTICASONE PROPIONATE 50 MCG
1 SPRAY, SUSPENSION (ML) NASAL DAILY
Qty: 11.1 ML | Refills: 0 | Status: SHIPPED | OUTPATIENT
Start: 2024-03-04 | End: 2024-06-12

## 2024-03-04 RX ORDER — LAMOTRIGINE 100 MG/1
200 TABLET ORAL ONCE
Status: COMPLETED | OUTPATIENT
Start: 2024-03-04 | End: 2024-03-04

## 2024-03-04 RX ADMIN — ACETAMINOPHEN 975 MG: 325 TABLET, FILM COATED ORAL at 12:39

## 2024-03-04 RX ADMIN — LAMOTRIGINE 200 MG: 100 TABLET ORAL at 19:11

## 2024-03-04 RX ADMIN — SODIUM CHLORIDE 1000 ML: 9 INJECTION, SOLUTION INTRAVENOUS at 12:40

## 2024-03-04 ASSESSMENT — COLUMBIA-SUICIDE SEVERITY RATING SCALE - C-SSRS
1. IN THE PAST MONTH, HAVE YOU WISHED YOU WERE DEAD OR WISHED YOU COULD GO TO SLEEP AND NOT WAKE UP?: NO
2. HAVE YOU ACTUALLY HAD ANY THOUGHTS OF KILLING YOURSELF IN THE PAST MONTH?: NO
6. HAVE YOU EVER DONE ANYTHING, STARTED TO DO ANYTHING, OR PREPARED TO DO ANYTHING TO END YOUR LIFE?: NO

## 2024-03-04 ASSESSMENT — ACTIVITIES OF DAILY LIVING (ADL)
ADLS_ACUITY_SCORE: 40
ADLS_ACUITY_SCORE: 38
ADLS_ACUITY_SCORE: 40
ADLS_ACUITY_SCORE: 40
ADLS_ACUITY_SCORE: 38
ADLS_ACUITY_SCORE: 40

## 2024-03-04 ASSESSMENT — ENCOUNTER SYMPTOMS
ARTHRALGIAS: 1
ACTIVITY CHANGE: 1

## 2024-03-04 NOTE — ED PROVIDER NOTES
"  History     Chief Complaint   Patient presents with    Fall     Patient slipped to the floor from a reclining chair this am at 0930     The history is provided by the patient and medical records.     Sara Liriano is a 48 year old female with a PMH of anorexia, seizures, TBI, hypothyroidism, depression, osteoarthritis right hip, and multiple falls who presents to the ED via EMS after falling this am at home. She reports she was trying to close the recliner chair this am and slipped out of the chair landing on her buttocks. She crawled around for approximately 5-10 minutes until \"that lady\" came and helped me. \"My legs will not go together.\" C/o increased pain to bilateral lower extremities. Denies numbness or tingling. She reports left foot will swell at times. Denies hitting her head. Increased weakness. C/o bilateral hip and leg pain. Scheduled to have MRI today of lumbar spine.     She is not on blood thinners. She has history of seizures (on Lamictal and Vimpat). S/p internal fixation of left hip at [facility] Dec/2023. CT pelvis revealed multiple chronic pelvic and sacral fractures including right inferior pubic ramus and right pubic bone that previously seen on CT pelvis 12/28/23. s    Allergies:  Allergies   Allergen Reactions    Fluoxetine Other (See Comments) and Swelling     Blurred vision, heart racing, face swelling    Amoxicillin Other (See Comments)     \"Thrush\"       Problem List:    Patient Active Problem List    Diagnosis Date Noted    Status post-operative repair of closed fracture of left hip 01/18/2024     Priority: Medium    Closed fracture of neck of left femur, initial encounter (H) 01/18/2024     Priority: Medium    Closed fracture of superior ramus of right pubis with routine healing, subsequent encounter 01/18/2024     Priority: Medium    Anorexia 11/29/2023     Priority: Medium    Seizure disorder (H) 11/08/2023     Priority: Medium    Bony sclerosis 11/08/2023     Priority: Medium "    Primary osteoarthritis of right hip 11/08/2023     Priority: Medium    Contusion of right hip, initial encounter 11/08/2023     Priority: Medium    Fall from slip, trip, or stumble, initial encounter 11/08/2023     Priority: Medium    Shoulder injury, right, initial encounter 11/08/2023     Priority: Medium    Inner ear dysfunction, left 11/08/2023     Priority: Medium    Closed nondisplaced fracture of pelvis, unspecified part of pelvis, initial encounter (H) 11/08/2023     Priority: Medium    Arthritis of right glenohumeral joint 11/08/2023     Priority: Medium        Past Medical History:    Past Medical History:   Diagnosis Date    Anorexia     Atypical depression     Hypothyroidism     Intracranial hemorrhage (H) 2018    Osteopenia     PTSD (post-traumatic stress disorder)     Seizure disorder (H)     TBI (traumatic brain injury) (H) 05/2018       Past Surgical History:    Past Surgical History:   Procedure Laterality Date    CRANIOPLASTY  08/08/2018    CRANIOTOMY  05/2018    TRACHEOSTOMY  06/2018       Family History:    Family History   Problem Relation Age of Onset    Hypertension Mother     Heart Failure Father     Diabetes Father     Hypertension Sister        Social History:  Marital Status:  Single [1]  Social History     Tobacco Use    Smoking status: Never     Passive exposure: Never    Smokeless tobacco: Never   Vaping Use    Vaping Use: Never used   Substance Use Topics    Alcohol use: Yes     Comment: Alcoholic Drinks/day: Occassional    Drug use: No     Comment: Drug use: No        Medications:    acetaminophen (TYLENOL) 500 MG tablet  bisacodyl (DULCOLAX) 5 MG EC tablet  calcium carbonate (OS-MOISES) 500 MG tablet  cyanocobalamin (VITAMIN B-12) 1000 MCG tablet  cyclobenzaprine (FLEXERIL) 5 MG tablet  docusate sodium (COLACE) 100 MG capsule  fluticasone (FLONASE) 50 MCG/ACT nasal spray  Folic Acid-Vit B6-Vit B12 (FOLBEE) 2.5-25-1 MG TABS  gabapentin (NEURONTIN) 600 MG tablet  hydrOXYzine (VISTARIL)  "25 MG capsule  IBANdronate (BONIVA) 150 MG tablet  ibuprofen (ADVIL/MOTRIN) 200 MG tablet  Lacosamide (VIMPAT) 100 MG TABS tablet  lamoTRIgine (LAMICTAL) 200 MG tablet  levothyroxine (SYNTHROID/LEVOTHROID) 50 MCG tablet  magnesium hydroxide (MILK OF MAGNESIA) 400 MG/5ML suspension  melatonin 3 MG tablet  multivitamin, therapeutic (THERA-VIT) TABS tablet  neomycin-polymyxin-dexAMETHasone (MAXITROL) 3.5-05351-5.1 SUSP ophthalmic susp  olopatadine (PATANOL) 0.1 % ophthalmic solution  omeprazole (PRILOSEC OTC) 20 MG EC tablet  polyethylene glycol (MIRALAX) 17 GM/Dose powder  polyethylene glycol (MIRALAX) 17 GM/Dose powder  potassium chloride ER (KLOR-CON M) 20 MEQ CR tablet  sodium chloride (OCEAN) 0.65 % nasal spray  thiamine (B-1) 100 MG tablet  vitamin D3 (CHOLECALCIFEROL) 50 mcg (2000 units) tablet          Review of Systems   Constitutional:  Positive for activity change.   Musculoskeletal:  Positive for arthralgias and gait problem.   All other systems reviewed and are negative.      Physical Exam   BP: 114/83  Pulse: 116  Temp: 99.5  F (37.5  C)  Resp: 14  Height: 162.6 cm (5' 4\")  Weight: 40.8 kg (90 lb)  SpO2: 96 %      Physical Exam  Vitals and nursing note reviewed.   Constitutional:       Appearance: She is ill-appearing. She is not toxic-appearing.   HENT:      Head: Normocephalic.      Right Ear: Tympanic membrane normal.      Left Ear: Tympanic membrane normal.      Nose: Nose normal.      Mouth/Throat:      Mouth: Mucous membranes are dry.   Eyes:      Conjunctiva/sclera: Conjunctivae normal.   Cardiovascular:      Rate and Rhythm: Regular rhythm. Tachycardia present.      Pulses: Normal pulses.   Pulmonary:      Effort: Pulmonary effort is normal.      Breath sounds: Normal breath sounds.   Abdominal:      General: Abdomen is flat. Bowel sounds are normal.   Musculoskeletal:      Cervical back: Neck supple.      Right hip: Tenderness present. Decreased strength.      Left hip: Decreased strength.      " Right lower leg: No swelling.      Left lower leg: No swelling.      Left foot: Foot drop present.      Comments: Scattered abrasions noted to bilateral shin areas.    Skin:     General: Skin is warm and dry.      Capillary Refill: Capillary refill takes less than 2 seconds.      Coloration: Skin is pale.   Neurological:      Mental Status: She is alert and oriented to person, place, and time. She is confused.      GCS: GCS eye subscore is 4. GCS verbal subscore is 5. GCS motor subscore is 6.      Sensory: Sensation is intact.   Psychiatric:         Mood and Affect: Affect is flat.           Results for orders placed or performed during the hospital encounter of 03/04/24 (from the past 24 hour(s))   CBC with platelets differential    Narrative    The following orders were created for panel order CBC with platelets differential.  Procedure                               Abnormality         Status                     ---------                               -----------         ------                     CBC with platelets and d...[[id number]]  Abnormal            Final result                 Please view results for these tests on the individual orders.   Comprehensive metabolic panel   Result Value Ref Range    Sodium 138 135 - 145 mmol/L    Potassium 4.5 3.4 - 5.3 mmol/L    Carbon Dioxide (CO2) 29 22 - 29 mmol/L    Anion Gap 5 (L) 7 - 15 mmol/L    Urea Nitrogen 8.6 6.0 - 20.0 mg/dL    Creatinine 0.34 (L) 0.51 - 0.95 mg/dL    GFR Estimate >90 >60 mL/min/1.73m2    Calcium 8.2 (L) 8.6 - 10.0 mg/dL    Chloride 104 98 - 107 mmol/L    Glucose 91 70 - 99 mg/dL    Alkaline Phosphatase 293 (H) 40 - 150 U/L    AST 24 0 - 45 U/L    ALT 30 0 - 50 U/L    Protein Total 5.1 (L) 6.4 - 8.3 g/dL    Albumin 2.8 (L) 3.5 - 5.2 g/dL    Bilirubin Total <0.2 <=1.2 mg/dL   Magnesium   Result Value Ref Range    Magnesium 2.4 (H) 1.7 - 2.3 mg/dL   [place] Draw    Narrative    The following orders were created for panel order [place]  Draw.  Procedure                               Abnormality         Status                     ---------                               -----------         ------                     Extra Blue Top Tube[[id number]]                              Final result               Extra Red Top Tube[[id number]]                               Final result               Extra Gray Top Tube[[id number]]                                                           Please view results for these tests on the individual orders.   CBC with platelets and differential   Result Value Ref Range    WBC Count 7.5 4.0 - 11.0 10e3/uL    RBC Count 3.20 (L) 3.80 - 5.20 10e6/uL    Hemoglobin 11.2 (L) 11.7 - 15.7 g/dL    Hematocrit 32.2 (L) 35.0 - 47.0 %     (H) 78 - 100 fL    MCH 35.0 (H) 26.5 - 33.0 pg    MCHC 34.8 31.5 - 36.5 g/dL    RDW 14.0 10.0 - 15.0 %    Platelet Count 539 (H) 150 - 450 10e3/uL    % Neutrophils 66 %    % Lymphocytes 22 %    % Monocytes 9 %    % Eosinophils 2 %    % Basophils 1 %    % Immature Granulocytes 0 %    NRBCs per 100 WBC 0 <1 /100    Absolute Neutrophils 4.9 1.6 - 8.3 10e3/uL    Absolute Lymphocytes 1.7 0.0 - 5.3 10e3/uL    Absolute Monocytes 0.7 0.0 - 1.3 10e3/uL    Absolute Eosinophils 0.1 0.0 - 0.7 10e3/uL    Absolute Basophils 0.1 0.0 - 0.2 10e3/uL    Absolute Immature Granulocytes 0.0 <=0.4 10e3/uL    Absolute NRBCs 0.0 10e3/uL   Extra Blue Top Tube   Result Value Ref Range    Hold Specimen JIC    Extra Red Top Tube   Result Value Ref Range    Hold Specimen JIC    Phosphorus   Result Value Ref Range    Phosphorus 3.1 2.5 - 4.5 mg/dL   Symptomatic Influenza A/B, RSV, & SARS-CoV2 PCR (COVID-19) Nasopharyngeal    Specimen: Nasopharyngeal; Swab   Result Value Ref Range    Influenza A PCR Negative Negative    Influenza B PCR Negative Negative    RSV PCR Negative Negative    SARS CoV2 PCR Negative Negative    Narrative    Testing was performed using the Xpert Xpress CoV2/Flu/RSV Assay on the [employer]  Instrument. This test should be ordered for the detection of SARS-CoV-2, influenza, and RSV viruses in individuals who meet clinical and/or epidemiological criteria. Test performance is unknown in asymptomatic patients. This test is for in vitro diagnostic use under the FDA EUA for laboratories certified under CLIA to perform high or moderate complexity testing. This test has not been FDA cleared or approved. A negative result does not rule out the presence of PCR inhibitors in the specimen or target RNA in concentration below the limit of detection for the assay. If only one viral target is positive but coinfection with multiple targets is suspected, the sample should be re-tested with another FDA cleared, approved, or authorized test, if coinfection would change clinical management. This test was validated by the [facility]. These laboratories are certified under the Clinical Laboratory Improvement Amendments of 1988 (CLIA-88) as qualified to perform high complexity laboratory testing.   Extra Tube    Narrative    The following orders were created for panel order Extra Tube.  Procedure                               Abnormality         Status                     ---------                               -----------         ------                     Extra Green Top (Lithium...[[id number]]                      Final result                 Please view results for these tests on the individual orders.   Extra Green Top (Lithium Heparin) Tube   Result Value Ref Range    Hold Specimen JIC    XR Pelvis and Hip Bilateral 2 Views    Narrative    PROCEDURE: XR PELVIS AND HIP BILATERAL 2 VIEWS 3/4/2024 12:03 PM    HISTORY: unwitnessed fall today, right hip pain.    COMPARISONS: 2/4/2024    TECHNIQUE: Pelvis one view, right hip 2 views, left hip 2 views,    FINDINGS: There are healing fractures of the ischium and pubic bone on  the right unchanged from previous examination. The articular space is  normal in  height at the right hip and sacrum and sacroiliac joints  appear normal. There is an intertrochanteric fracture of the proximal  femur on the left. This is been fixed with intramedullary melva and  femoral neck screw. Ossified callus is visualized. The alignment is  unchanged from previous examination.         Impression    IMPRESSION: Progression of bony healing at the left hip fracture and  that the issue pubic bone fractures on the right. No change in  alignment from 2/4/2024    STEPHANIE SAINI MD         SYSTEM ID:  [id number]   CT Head w/o Contrast    Narrative    PROCEDURE: CT HEAD W/O CONTRAST     HISTORY: unwitnessed fall today. increased confusion.    COMPARISON: 12/28/2023    TECHNIQUE:  Helical images of the head from the foramen magnum to the  vertex were obtained without contrast.    FINDINGS: There is encephalomalacia seen in the left temporal and left  parietal lobes stable from previous examination. There is compensatory  enlargement of the left lateral ventricle. Postoperative changes are  seen in the left temporal and parietal bones.    The brainstem and cerebellum are normal.  The visualized paranasal  sinuses are clear.      Impression    IMPRESSION: No change from 12/28/2023      STEPHANIE SAINI MD         SYSTEM ID:  [id number]   UA with Microscopic reflex to Culture    Specimen: Urine, Midstream   Result Value Ref Range    Color Urine Light Yellow Colorless, Straw, Light Yellow, Yellow    Appearance Urine Slightly Cloudy (A) Clear    Glucose Urine Negative Negative mg/dL    Bilirubin Urine Negative Negative    Ketones Urine Negative Negative mg/dL    Specific Gravity Urine 1.019 1.000 - 1.030    Blood Urine Negative Negative    pH Urine 7.5 5.0 - 9.0    Protein Albumin Urine Negative Negative mg/dL    Urobilinogen Urine Normal Normal, 2.0 mg/dL    Nitrite Urine Negative Negative    Leukocyte Esterase Urine Negative Negative    Mucus Urine Present (A) None Seen /LPF    RBC Urine 2 <=2 /HPF    WBC  "Urine 2 <=5 /HPF    Narrative    Urine Culture not indicated       Medications   sodium chloride 0.9% BOLUS 1,000 mL (0 mLs Intravenous Stopped 3/4/24 1541)   acetaminophen (TYLENOL) tablet 975 mg (975 mg Oral $Given 3/4/24 1239)   lamoTRIgine (LaMICtal) tablet 200 mg (200 mg Oral $Given 3/4/24 1911)       Assessments & Plan (with Medical Decision Making)  Sara Liriano is a 48 year old female with a PMH of anorexia, seizures, TBI, hypothyroidism, depression, osteoarthritis right hip, and multiple falls who presents to the ED via EMS after falling this am at home. She reports she was trying to close the recliner chair this am and slipped out of the chair landing on her buttocks. She crawled around for approximately 5-10 minutes until \"that lady\" came and helped me. \"My legs will not go together.\" C/o increased pain to bilateral lower extremities. Denies numbness or tingling. She reports left foot will swell at times. Denies hitting her head. Increased weakness. C/o bilateral hip and leg pain. Scheduled to have MRI today of lumbar spine.   She is not on blood thinners. She has history of seizures (on Lamictal and Vimpat). S/p internal fixation of left hip at [facility] Dec/[facility]23. CT pelvis revealed multiple chronic pelvic and sacral fractures including right inferior pubic ramus and right pubic bone that previously seen on CT pelvis 12/28/23.   VS in the ED. /86   Pulse 105   Temp 99.5  F (37.5  C) (Tympanic)   Resp 14   Ht 1.626 m (5' 4\")   Wt 40.8 kg (90 lb)   LMP 01/01/2013 (Within Years)   SpO2 94%   BMI 15.45 kg/m  Awake and alert, cachetic ill appearing. Mouth and throat dry. HRR tachy. Decreased ROM of bilateral lower extremities. Bilateral lower extremity weakness. Left foot drop noted. Scattered scabs and abrasions to bilateral shins. Increased pain of bilateral hips with passive ROM. Right foot noted to be externally rotated. Will give IVF. Tylenol given for pain. Requesting tylenol, " anything else makes her sick. Social work consult placed for possible rehab, placement.   DDx: Possible right hip fracture- fell today from seated position landing on her buttocks, right lower leg and foot externally rotated outward (xray hip/pelvis pending). Increased weakness and pain possibly r/t to lower back pain- scheduled MRI today. Hx severe L1 vertebral body height loss without retropulsion, worse compared to 2/4/2024.   Diagnostics:    Lab: Covid/Flu/RSV negative. CBC- Hgb 11.2, hematocrit 32.2, RBC 3.20. Platelets 539. 2 weeks ago Hgb was normal. Mag 2.4- hx hypermagnesemia.  CMP okay.   UA- okay.           .     X-ray  I independently reviewed xray pelvis hips and then confirmed by radiology findings noted progression of bony healing at the left hip fracture and that the issue pubic bone fractures on the right. No change in alignment from 2/4/2024     CT scan  I independently reviewed CT head without contrast and then confirmed by radiology findings noted there is encephalomalacia seen in the left temporal and left parietal lobes stable from previous examination. There is compensatory enlargement of the left lateral ventricle. Postoperative changes are seen in the left temporal and parietal bones. No change from 12/28/2023     MRI Lumbar Spine   Radiology findings noted impression there is a severe L1 compression fracture with essentially complete  loss of the vertebral body height and associated marrow edema, similar to 2/13/2024 but worsened since 2/4/2024 given differences in technique. There is 12 mm retropulsion of the posterior cortex with severe associated spinal stenosis.    Spoke with Sharon  who will check with local nursing homes for placement/rehab. She spoke with homecare OT who reports pt is very weak. Recommended calling 911 to be evaluated in the ED r/t increased weakness. Multiple bottles of mag citrate and laxatives found in the home.     1:30 PM- Sharon spoke with pt and  pt's mother. Pt refusing any rehab or placement.   2:43 PM- Pt is adamant about going home today. Awaiting MRI lumbar spine  2:53 PM- Sharon spoke with pt's mother. Pt's mother is pt's legal guardian and would like placement for rehab/PT/OT. Sharon sent pt's info to [facility], [place], operated by [facility]. Pending MRI results.   5:29 PM- Updated pt on plan of care. Pt agreeable to staying in the hospital.   5:52 PM- Paged neurosurgery for consult. Severe L1 compression fracture with retropulsion of posterior cortex.   5:56 PM- Spoke with Dr. Black neurosurgeon who kindly accepts the pt. Awaiting call from [facility] trauma services.  6:03 PM- Dr. Crowley trauma services kindly accepts to ED urgent consult.   6:05 PM- Attempted call to pt's mother regarding plan of care. Message left.   Spoke with Dr. Black neurosurgeon at [facility] who kindly accepts the pt for a severe L1 compression fracture with essentially complete loss of the vertebral body height and associated marrow edema and retropulsion with severe leg weakness, foot drop and increased bilateral leg pain. Will transfer to [facility] trauma services. Spoke with Dr. Crowley with trauma services who kindly accepts pt as ED urgent consult. Saleh ordered. Bed rest. Spoke with pt's mother Janki guardian who agrees with the transfer to [facility]. Discussed MRI lumbar findings with the pt. Pt agreeable with the plan to transfer to [facility]. Pt concerned about her Lamictal and Lacosamide for seizures. Ordered her usual PM Lamictal dose. We do not carry the Lacosamide in our pharmacy. Transferred to [facility] for trauma/neurosurgery.        I have reviewed the nursing notes.    I have reviewed the findings, diagnosis, plan and need for follow up with the patient.    Medical Decision Making  The patient's presentation was of high complexity (a chronic illness severe exacerbation, progression, or side effect of treatment).    The patient's evaluation involved:  an  assessment requiring an independent historian (see separate area of note for details)  review of external note(s) from 3+ sources (see separate area of note for details)  review of 3+ test result(s) ordered prior to this encounter (see separate area of note for details)  ordering and/or review of 3+ test(s) in this encounter (see separate area of note for details)    The patient's management necessitated high risk (a decision regarding hospitalization).      Final diagnoses:   Failure to thrive in adult   Generalized muscle weakness   Bilateral leg pain   Traumatic compression fracture of L1 lumbar vertebra, closed, initial encounter (H)       3/4/2024   [facility] Diana, GIOVANNA CNP  03/04/24 [id number]

## 2024-03-04 NOTE — ED NOTES
Patient stated earlier that she ambulates at home. However, she is unable to perform any bed mobility or sit on the side of the bed independently. Patient was assisted to the edge of bed, in attempt to use the bedside commode. This required a full assist by staff. Patient was placed back into bed and bedpan was utilized. NP made aware.

## 2024-03-04 NOTE — PROGRESS NOTES
:    Received a call from OT at [facility].  Apparently they went out to see patient and she was very weak and has had multiple falls.  Patient is currently living with her mother who is elderly.    Met with patient and her mother in room and offered patient SNF options possibly private pay and patient declined. Mother is stating she cannot care for her and she is angry as she found out she is taking laxatives and lying about it.  Mother stated patient defecates in a bucket as she cannot make it to the bathroom in time.    Offered patient resources for an eating disorder clinic and she declined.    Patients mother presented guardianship paperwork dated 2018.  Call placed to Zulema at [facility] to see if this is still valid.  Mother states she would like patient to go to a rehab center for strengthening and she stated patient has the money to pay for this privately if needed.    Also gave patients mother number to our local veterans office to see about paperwork for service connection for future services at the house.    Also for [facility] information for community support.    ANDRE Cardoza on 3/4/2024 at 11:46 AM       :    Call placed to court admin and guardianship paperwork is still valid.  Also sent to our [employer] team.    Spoke with patients mother/guardian and she would like her daughter to be placed at [facility] for two weeks.  Mother would like her to come back home after the two weeks.  Referral was sent to [facility] and message left for STR. She stated she does not like to push the guardianship paperwork but she feels her daughter needs the strengthening.    ANDRE Cardoza on 3/4/2024 at 2:24 PM

## 2024-03-04 NOTE — ED TRIAGE NOTES
"Patient being evaluated today after a fall at home this am. Patient had a femur fx in December with surgery and a lengthy recovery. She states that she was doing in-home physical therapy this am. She was getting up from a reclining chair when she slipped and fell to the floor from the chair. She denies any injury but does report bilateral leg and foot pain. However, it is difficult to determine if these pains are new since her fall. Patient is a poor historian. EMS state that she would like to see  for short term rehab options as she has been falling frequently at home. /83   Pulse 116   Temp 99.5  F (37.5  C) (Tympanic)   Resp 14   Ht 1.626 m (5' 4\")   Wt 40.8 kg (90 lb)   LMP 01/01/2013 (Within Years)   SpO2 96%   BMI 15.45 kg/m         Triage Assessment (Adult)       Row Name 03/04/24 1058          Triage Assessment    Airway WDL WDL        Respiratory WDL    Respiratory WDL WDL        Skin Circulation/Temperature WDL    Skin Circulation/Temperature WDL WDL        Cardiac WDL    Cardiac WDL WDL        Peripheral/Neurovascular WDL    Peripheral Neurovascular WDL WDL        Cognitive/Neuro/Behavioral WDL    Cognitive/Neuro/Behavioral WDL WDL                     "

## 2024-03-05 ENCOUNTER — MEDICAL CORRESPONDENCE (OUTPATIENT)
Dept: HEALTH INFORMATION MANAGEMENT | Facility: OTHER | Age: 49
End: 2024-03-05
Payer: OTHER GOVERNMENT

## 2024-03-08 ENCOUNTER — TELEPHONE (OUTPATIENT)
Dept: FAMILY MEDICINE | Facility: OTHER | Age: 49
End: 2024-03-08
Payer: OTHER GOVERNMENT

## 2024-03-08 DIAGNOSIS — M80.08XD AGE-RELATED OSTEOPOROSIS WITH CURRENT PATHOLOGICAL FRACTURE OF VERTEBRA WITH ROUTINE HEALING: Primary | ICD-10-CM

## 2024-03-08 NOTE — TELEPHONE ENCOUNTER
WARREN Tovar  reviewed and completed the following home care or hospice form(s) for Home Care on 2-29-24   This covers the certification period effective 2-29-24 to 4-28-24.  Barb Gabriel LPN on 3/8/2024 at 11:25 AM

## 2024-03-11 ENCOUNTER — CARE COORDINATION (OUTPATIENT)
Dept: EMERGENCY MEDICINE | Facility: OTHER | Age: 49
End: 2024-03-11
Payer: OTHER GOVERNMENT

## 2024-03-11 NOTE — PROGRESS NOTES
Patient Screening    PCP: Yes: Tammy Rhoades    Living Situation:  Living with mother/Legal guardian    Followed by Care Coordination:  No    Insurance: Yes: [facility]    Chronic Conditions: Yes: Anorexia, Hypothyroidism, Seizure disorder, Osteoporosis, recent fractures, TBI     Mental Health/Substance Abuse: Yes:      Interventions: Yes: [facility], offered her eating disorder clinics, offered rehab.  Mom is legal guardian    Follow Up: Yes: Referred to Case Coordination at [facility]    ED Continue Following: No    ANDRE Cardoza 3/11/2024 2:42 PM

## 2024-03-21 ENCOUNTER — PATIENT OUTREACH (OUTPATIENT)
Dept: CARE COORDINATION | Facility: CLINIC | Age: 49
End: 2024-03-21
Payer: OTHER GOVERNMENT

## 2024-03-21 NOTE — PROGRESS NOTES
Clinic Care Coordination Contact    Received referral from ER , Sukhdev, for [facility] Care Coordination due to patient's ongoing medical needs.      Writer initiated a phone contact with patient's Guardian/Mother and Primary Caregiver, Janki, today.  No answer. Left VM encouraging a call back to discuss patient's current status and referral.      Will await a call back.     Plan:  Offer Care Coordination to assess and offer assist with community resources/referrals; ongoing support, encouragement, reframing.     ANDRE GUERRERO on 3/21/2024 at 5:18 PM    Clinic Care Coordination Contact    Received a phone message from patient's Guardian/Mother, Janki.  She stated to go ahead and call her back.      Writer initiated another phone call to both phone numbers listed but no answer on either.  I did leave a voice message encouraging her to keep trying.     Reassured her writer would attempt another call on next business day.     Plan: Continue to attempt to contact patient.  Offer Care Coordination to assess and offer assist with community resources/referrals; offer encouragement, validation, support.     ANDRE GUERRERO on 3/22/2024 at 5:01 PM    Clinic Care Coordination Contact    Writer received call from patient's mother/guardian, Janki, today.  She states that patient remains in a Swingbed at [facility]. Patient has been undergoing rehab there and possible surgery on her back/skeletal structure but orthopedic surgeon is stating to Guardian/mother, Janki, that she is not a good candidate due to condition of bones.     Shay discussed alternatives for care approach.  She stated that the team met with her via phone conversation last Friday and stated that they feel she will need 24 hr care and supervision.  They are considering referring her to [facility] in [facility] but Janki/prashant feels that is too far away for her to see her daughter regularly.   We discussed local options such as [facility]/possible [facility] and maybe [facility] near Janki's home here in [place], [place].  Janki/guardian stated that patient has the resources to pay privately as well.      We also discussed hospice referral if her condition appears to be progressing and Janki/Guardian agreed to consider this as a support to her and patient.     Janki/Guardian requested that writer pursue referral to both [facility] and [facility]. Writer will initiate call to Care Coordination at [facility] and offer to assist with placement if indicated by their team as well.  Writer will get back to Janki/prashant on this.      ANDRE GUERRERO on 3/25/2024 at 11:20 AM    Clinic Care Coordination Contact    Initiated call to [facility] and  with Care Coordinator, Jenna, re: Janki/prashant's request for a local referral for placement.     Will await Care Coordinator's call back to writer.     ANDRE GUERRERO on 3/25/2024 at 11:26 AM    Clinic Care Coordination Contact    Spoke with [facility] Care CoordinatorJenna, who is currently working with patient and her mother/guardian, Janki re: patient's potential placement in long term care setting.  As patient's condition progresses and her level of care needs increase, patient is unable to return to living with her guardian/mother, Janki.  Options for placement due to patient's level of needs and care expectations have been increasingly limited.     Writer will again initiate a phone conversation with guardian/mother, Janki, to discuss options as stated by Care Coordinator/Jenna.     Plan:  Call guardian/mother, Janki, and coordinate assist after this phone contact.     ANDRE GUERRERO on 3/25/2024 at 4:15 PM      Clinic Care Coordination Contact    Initiated phone contact with Janki/mother/guardian but no answer.  Left voice message  encouraging her to call back to discuss discharge plan.     ANDRE GUERRERO on 3/25/2024 at 5:41 PM

## 2024-04-08 ENCOUNTER — PATIENT OUTREACH (OUTPATIENT)
Dept: CARE COORDINATION | Facility: CLINIC | Age: 49
End: 2024-04-08
Payer: OTHER GOVERNMENT

## 2024-04-08 NOTE — PROGRESS NOTES
"Clinic Care Coordination Contact      Initiated a phone contact with patient's Care Coordinator, Jenna LUNDBERG/CHUCK from [facility] in [place], [place].  Left  for Jenna inquiring re: patient's status there in the \"swingbed\" status.     Writer had last spoken with patient's Guardian, Janki, on 3-25-24 despite attempting contact with her and leaving . Writer attempted a phone contact with Janki/Guardian but no answer, so left .      Direct caregiver/nurse on patient's unit at hospital confirmed that patient will be departing tomorrow to a placement.     Plan: Will await confirmation from either RN/Jenna at [facility] or Guardian/Janki as to where patient is being transferred to for placement.  Will reassess need for [facility] Care Coordination at that point.     ANDRE GUERRERO on 4/8/2024 at 4:21 PM      "

## 2024-04-09 ENCOUNTER — TELEPHONE (OUTPATIENT)
Dept: FAMILY MEDICINE | Facility: OTHER | Age: 49
End: 2024-04-09
Payer: OTHER GOVERNMENT

## 2024-04-09 NOTE — TELEPHONE ENCOUNTER
Future visit cancelled, Hospice indicated that if something comes up that they can not do then they will call the clinic for an appointment.      Barb Gabriel LPN 4/9/2024 3:45 PM

## 2024-04-09 NOTE — TELEPHONE ENCOUNTER
They are letting you know that patient is admitted to hospice as of today and if you have any concern or address anything, just call.      Mónica Winter on 4/9/2024 at 3:32 PM

## 2024-04-10 ENCOUNTER — MEDICAL CORRESPONDENCE (OUTPATIENT)
Dept: HEALTH INFORMATION MANAGEMENT | Facility: OTHER | Age: 49
End: 2024-04-10
Payer: OTHER GOVERNMENT

## 2024-04-11 ENCOUNTER — PATIENT OUTREACH (OUTPATIENT)
Dept: CARE COORDINATION | Facility: CLINIC | Age: 49
End: 2024-04-11
Payer: OTHER GOVERNMENT

## 2024-04-11 NOTE — PROGRESS NOTES
"Clinic Care Coordination Contact      Writer had received a voice message from patient's Guardian/mother, Janki, in past 24 hrs sharing that patient has been placed at an area intermediate: [facility] for 24 hr care/supervision.     Patient also receiving Hospice Care through [facility].     Writer attempted phone contact with Guardian/mother, Janki, just now and no answer.  Left voice message alerting her that writer would be \"graduating\" patient's enrollment from Care Coordination at this time as patient has been placed with goal accomplished for safe and adequate housing and services to meet patient's  declining needs.     Writer also encouraged Guardian/mother to reach back if she were to need support in the future.     ANDRE GUERRERO on 4/11/2024 at 12:13 PM          "

## 2024-04-24 ENCOUNTER — MEDICAL CORRESPONDENCE (OUTPATIENT)
Dept: HEALTH INFORMATION MANAGEMENT | Facility: OTHER | Age: 49
End: 2024-04-24
Payer: OTHER GOVERNMENT

## 2024-05-08 ENCOUNTER — MEDICAL CORRESPONDENCE (OUTPATIENT)
Dept: HEALTH INFORMATION MANAGEMENT | Facility: OTHER | Age: 49
End: 2024-05-08
Payer: OTHER GOVERNMENT

## 2024-05-08 ENCOUNTER — TELEPHONE (OUTPATIENT)
Dept: FAMILY MEDICINE | Facility: OTHER | Age: 49
End: 2024-05-08
Payer: OTHER GOVERNMENT

## 2024-05-08 DIAGNOSIS — R56.9 SEIZURES (H): ICD-10-CM

## 2024-05-08 NOTE — TELEPHONE ENCOUNTER
Spoke with [facility], patient has refills on file at [facility]. They stated they will send patient's mom to  prescription there.     Yumiko Brewer RN on 5/8/2024 at 9:00 AM

## 2024-05-08 NOTE — TELEPHONE ENCOUNTER
Reason for call: Medication or medication refill    Have you contacted your pharmacy regarding this refill? Yes    If No, direct the patient to contact the Pharmacy and discontinue telephone note using Erroneous Encounter.  If Yes, continue.    Name of medication requested: lacosamide     How many days of medication do you have left? 1 day    What pharmacy do you use? [id number]    Preferred method for responding to this message: Telephone Call    Phone number patient can be reached at: Other phone number:  [phone]*    If we cannot reach you directly, may we leave a detailed response at the number you provided? Yes

## 2024-05-08 NOTE — TELEPHONE ENCOUNTER
Patient is switching from [facility] to [facility]. Needing a new Rx sent to them.     Dodie Chua LPN on 5/8/2024 at 3:03 PM

## 2024-05-10 DIAGNOSIS — K21.00 GASTROESOPHAGEAL REFLUX DISEASE WITH ESOPHAGITIS, UNSPECIFIED WHETHER HEMORRHAGE: Primary | ICD-10-CM

## 2024-05-10 RX ORDER — OMEPRAZOLE 20 MG/1
20 TABLET, DELAYED RELEASE ORAL DAILY
Qty: 90 TABLET | Refills: 3 | Status: SHIPPED | OUTPATIENT
Start: 2024-05-10 | End: 2024-07-16

## 2024-05-10 NOTE — TELEPHONE ENCOUNTER
Lacosamide (VIMPAT) 100 MG TABS tablet         Last Written Prescription Date:  10/23/23  Last Fill Quantity: 180,   # refills: 2  Last Office Visit: 2/12/24  Future Office visit:       Routing refill request to provider for review/approval because:  Drug not on the [facility], P or [facility] refill protocol or controlled substance. Unable to complete prescription refill per RNMedication Refill Policy.................... Vikki Moctezuma RN ....................  5/10/2024   5:51 PM

## 2024-05-12 RX ORDER — LACOSAMIDE 100 MG/1
100 TABLET ORAL 2 TIMES DAILY
Qty: 180 TABLET | Refills: 3 | Status: SHIPPED | OUTPATIENT
Start: 2024-05-12 | End: 2024-07-16

## 2024-05-20 ENCOUNTER — MEDICAL CORRESPONDENCE (OUTPATIENT)
Dept: HEALTH INFORMATION MANAGEMENT | Facility: OTHER | Age: 49
End: 2024-05-20
Payer: OTHER GOVERNMENT

## 2024-05-22 ENCOUNTER — MEDICAL CORRESPONDENCE (OUTPATIENT)
Dept: HEALTH INFORMATION MANAGEMENT | Facility: OTHER | Age: 49
End: 2024-05-22

## 2024-06-03 DIAGNOSIS — R56.9 SEIZURES (H): ICD-10-CM

## 2024-06-03 RX ORDER — LAMOTRIGINE 200 MG/1
200 TABLET ORAL 2 TIMES DAILY
Qty: 180 TABLET | Refills: 3 | Status: SHIPPED | OUTPATIENT
Start: 2024-06-03 | End: 2024-07-16

## 2024-06-03 NOTE — PROGRESS NOTES
VIKRAM requesting refills of Noelle's lamotrigine.     1. Seizures (H)    - lamoTRIgine (LAMICTAL) 200 MG tablet; Take 1 tablet (200 mg) by mouth 2 times daily  Dispense: 180 tablet; Refill: 3      GIOVANNA Blanco CNP on 6/3/2024 at 9:49 AM

## 2024-06-04 ENCOUNTER — MEDICAL CORRESPONDENCE (OUTPATIENT)
Dept: HEALTH INFORMATION MANAGEMENT | Facility: OTHER | Age: 49
End: 2024-06-04
Payer: OTHER GOVERNMENT

## 2024-06-12 ENCOUNTER — NURSING HOME VISIT (OUTPATIENT)
Dept: GERIATRICS | Facility: OTHER | Age: 49
End: 2024-06-12
Attending: NURSE PRACTITIONER
Payer: OTHER GOVERNMENT

## 2024-06-12 DIAGNOSIS — G40.909 SEIZURE DISORDER (H): Primary | ICD-10-CM

## 2024-06-12 DIAGNOSIS — Z79.899 ENCOUNTER FOR MEDICATION REVIEW: ICD-10-CM

## 2024-06-12 DIAGNOSIS — R63.0 ANOREXIA: ICD-10-CM

## 2024-06-12 DIAGNOSIS — Z78.9 LIVES IN ASSISTED LIVING FACILITY: ICD-10-CM

## 2024-06-12 DIAGNOSIS — Z51.5 COMFORT MEASURES ONLY STATUS: ICD-10-CM

## 2024-06-12 DIAGNOSIS — M16.11 PRIMARY OSTEOARTHRITIS OF RIGHT HIP: ICD-10-CM

## 2024-06-12 PROCEDURE — 99348 HOME/RES VST EST LOW MDM 30: CPT | Performed by: NURSE PRACTITIONER

## 2024-06-12 RX ORDER — AMOXICILLIN 250 MG
1 CAPSULE ORAL DAILY PRN
COMMUNITY

## 2024-06-12 RX ORDER — HYDROMORPHONE HYDROCHLORIDE 2 MG/1
2 TABLET ORAL
COMMUNITY

## 2024-06-12 RX ORDER — CETIRIZINE HYDROCHLORIDE 10 MG/1
10 TABLET ORAL DAILY PRN
COMMUNITY

## 2024-06-12 RX ORDER — PROCHLORPERAZINE MALEATE 10 MG
10 TABLET ORAL EVERY 6 HOURS PRN
COMMUNITY

## 2024-06-12 RX ORDER — LOPERAMIDE HCL 2 MG
2 CAPSULE ORAL
COMMUNITY

## 2024-06-12 RX ORDER — LORAZEPAM 0.5 MG/1
0.5 TABLET ORAL EVERY 4 HOURS PRN
COMMUNITY

## 2024-06-12 RX ORDER — MIRTAZAPINE 15 MG/1
15 TABLET, FILM COATED ORAL
COMMUNITY

## 2024-06-12 RX ORDER — TRAMADOL HYDROCHLORIDE 50 MG/1
50 TABLET ORAL EVERY 6 HOURS PRN
COMMUNITY

## 2024-06-12 RX ORDER — BISACODYL 10 MG
10 SUPPOSITORY, RECTAL RECTAL DAILY PRN
COMMUNITY

## 2024-06-12 RX ORDER — HYOSCYAMINE SULFATE 0.125 MG
0.12 TABLET,DISINTEGRATING ORAL EVERY 4 HOURS PRN
COMMUNITY

## 2024-06-12 RX ORDER — ACETAMINOPHEN 325 MG/1
650 TABLET ORAL 2 TIMES DAILY
COMMUNITY
End: 2024-07-16

## 2024-06-12 RX ORDER — ONDANSETRON 8 MG/1
8 TABLET, ORALLY DISINTEGRATING ORAL EVERY 6 HOURS PRN
COMMUNITY

## 2024-06-19 ENCOUNTER — MEDICAL CORRESPONDENCE (OUTPATIENT)
Dept: HEALTH INFORMATION MANAGEMENT | Facility: OTHER | Age: 49
End: 2024-06-19
Payer: OTHER GOVERNMENT

## 2024-06-19 ASSESSMENT — ENCOUNTER SYMPTOMS
FATIGUE: 0
CHEST TIGHTNESS: 0
ARTHRALGIAS: 1
FEVER: 0
SEIZURES: 1
CHILLS: 0

## 2024-06-19 NOTE — PROGRESS NOTES
Sara Liriano is a 49 year old female being seen today for comprehensive visit at [facility].    Assessment & Plan       ICD-10-CM    1. Seizure disorder (H)  G40.909       2. Comfort measures only status  Z51.5       3. Anorexia  R63.0       4. Primary osteoarthritis of right hip  M16.11       5. Encounter for medication review  Z79.899       6. Lives in assisted living facility  Z59.3       Sara recently moved to [facility]. Sara has a history of severe anorexia nervosa with severe malnutrition,TBI, seizure disorder, and osteoarthritis. She does have a guardian.  She also has a history of repeated falls from which she sustained multiple fractures. Since January 2024, Sara has been seen in the ER six times, with an additional two hospital admissions.    Sara was admitted to swing bed at  on 3/18/24 due to physical deconditioning. She was discharged to [place] from an inpatient stay at [facility]. She was initially seen in clinic, where she underwent workup  for severe bilateral leg weakness and was then admitted with lumbar spine fracture, and progressive lower extremity weakness, following a fall. She was seen by Neurosurgery, while inpatient in [place] and given her overall health status it was felt that surgical intervention would not guarantee any significant neurologic improvement and  TLSO bracing was recommended.    Sara and her mother wish to continue with comfort focused treatment, instead of pursuing active rehabilitation for her anorexia. During hospitalization, Sara was working with PT/OT, as she was unable to stand or participate in ADL's. Prior to discharge from the hospital, Sara was utilizing a dolphin mattress, and a TSLO brace for all transfers and ambulation. She continues to use a wheelchair for ambulation and wears her TSLO brace for all transfers. The only time the brace is removed is during rest/ sleep.    Sara is  currently open to hospice services through [facility]. Unsure at this time whether she will meet criteria for re-certification.    Today on exam, Sara is seated in her wheelchair in her room. She engages in conversation. Affect is overall flat but bright. Writer and Sara discussed what [facility]. Sara verbalizes understanding and brings forth no additional concerns during today's visit.     All medications reviewed and reconciled today. [facility] medication list updated to reflect most up to date [facility] medication list.            On hospice services through [facility]. [facility] staff member say Sara at [facility] yesterday, alone, buying milk of magnesia.       30 minutes spent by me on the date of the encounter doing chart review, history and exam, documentation and further activities per the note      Return if symptoms worsen or fail to improve.    GIOVANNA Blanco [facility]     Code Status: Full Code.   Health Care Power of : Extended Emergency Contact Information  Primary Emergency Contact: Janki Welch (GUARDIAN)  Address: [address] [place] [address] [id number] [place] [address]  Home Phone: [phone]  Mobile Phone: [phone]  Relation: Mother  Secondary Emergency Contact: Aydee Corry  Address: [address]           [place] [address] [facility]  Mobile Phone: [phone]  Relation: Sister     Allergies: Fluoxetine and Amoxicillin     Past Medical, Surgical, Family and Social History reviewed: YES.     Medications:   Current Outpatient Medications   Medication Sig Dispense Refill    acetaminophen (TYLENOL) 325 MG tablet Take 650 mg by mouth 2 times daily Do not exceed 4000 mg from all sources.      bisacodyl (DULCOLAX) 10 MG suppository Place 10 mg rectally daily as needed for constipation      cetirizine (ZYRTEC) 10 MG tablet Take 10 mg by mouth daily as needed for allergies      cyclobenzaprine (FLEXERIL) 5 MG  tablet Take 1 tablet (5 mg) by mouth 3 times daily as needed for muscle spasms (Patient taking differently: Take 5 mg by mouth 3 times daily May have 1 additional 5 mg dose PRN daily) 90 tablet 0    HYDROmorphone (DILAUDID) 2 MG tablet Take 2 mg by mouth every 2 hours as needed for severe pain or shortness of breath Medication provided by [facility]      hyoscyamine 0.125 MG TBDP Take 0.125 mg by mouth every 4 hours as needed (secretions) Medication provided by [facility]      Lacosamide (VIMPAT) 100 MG TABS tablet Take 1 tablet (100 mg) by mouth 2 times daily 180 tablet 3    lamoTRIgine (LAMICTAL) 200 MG tablet Take 1 tablet (200 mg) by mouth 2 times daily 180 tablet 3    levothyroxine (SYNTHROID/LEVOTHROID) 50 MCG tablet Take 1 tablet (50 mcg) at 7 am daily, 1 hour prior to eating and taking other medications 90 tablet 3    loperamide (IMODIUM) 2 MG capsule Take 2 mg by mouth Take 4 mg with first loose stool, followed by 2 mg after each loose stool. Maximum 16 mg/day (8/mg OTC)      LORazepam (ATIVAN) 0.5 MG tablet Take 0.5 mg by mouth every 4 hours as needed for anxiety Medication provided by [facility]      magnesium hydroxide (MILK OF MAGNESIA) 400 MG/5ML suspension Take 30 mLs by mouth daily as needed for constipation      melatonin 3 MG tablet Take 1 tablet (3 mg) by mouth nightly as needed for sleep 90 tablet 3    mirtazapine (REMERON) 15 MG tablet Take 15 mg by mouth nightly as needed (sleep aid)      omeprazole (PRILOSEC OTC) 20 MG EC tablet Take 1 tablet (20 mg) by mouth daily 90 tablet 3    ondansetron (ZOFRAN ODT) 8 MG ODT tab Take 8 mg by mouth every 6 hours as needed for nausea      polyethylene glycol (MIRALAX) 17 GM/Dose powder Take 17 g (1 Capful) by mouth daily (Patient taking differently: Take 1 Capful by mouth daily as needed) 850 g 4    prochlorperazine (COMPAZINE) 10 MG tablet Take 10 mg by mouth every 6 hours as needed for nausea or vomiting Medication provided by [facility]-docusa  (SENOKOT-S/PERICOLACE) 8.6-50 MG tablet Take 1 tablet by mouth daily as needed for constipation      traMADol (ULTRAM) 50 MG tablet Take 50 mg by mouth every 6 hours as needed for severe pain           Review of Systems   Constitutional:  Negative for chills, fatigue and fever.        Anorexia     Respiratory:  Negative for chest tightness.    Cardiovascular:  Negative for chest pain.   Musculoskeletal:  Positive for arthralgias.   Neurological:  Positive for seizures.        TBI   All other systems reviewed and are negative.      Toileting:    Continent of Bowel:  intermittent    Continent of Bladder:  intermittent  Mobility: wheelchair, TSLO brace fort all transfers and ambulation.    Recent Labs: Most recent labs reviewed.    Current Therapies: Hospice through [facility]    Physical Exam  Constitutional:       Appearance: Normal appearance. She is underweight.   Cardiovascular:      Rate and Rhythm: Normal rate.   Pulmonary:      Effort: Pulmonary effort is normal.   Musculoskeletal:         General: Signs of injury (wears TSLO brace) present.   Skin:     General: Skin is warm and dry.   Neurological:      Mental Status: She is alert. Mental status is at baseline.      Motor: Weakness present.   Psychiatric:         Mood and Affect: Affect is flat.         Speech: Speech normal.         Behavior: Behavior normal.         Thought Content: Thought content normal.

## 2024-06-27 ENCOUNTER — MEDICAL CORRESPONDENCE (OUTPATIENT)
Dept: HEALTH INFORMATION MANAGEMENT | Facility: OTHER | Age: 49
End: 2024-06-27
Payer: OTHER GOVERNMENT

## 2024-06-27 ENCOUNTER — TELEPHONE (OUTPATIENT)
Dept: FAMILY MEDICINE | Facility: OTHER | Age: 49
End: 2024-06-27
Payer: OTHER GOVERNMENT

## 2024-06-27 NOTE — TELEPHONE ENCOUNTER
Melanie from [facility] said patient is graduating from hospice. She has increased her weight, upper arm circumference,and become independent with activities of daily living. If any changes in the future , she can return.         Akosua Cole on 6/27/2024 at 8:51 AM

## 2024-07-03 ENCOUNTER — MEDICAL CORRESPONDENCE (OUTPATIENT)
Dept: HEALTH INFORMATION MANAGEMENT | Facility: OTHER | Age: 49
End: 2024-07-03
Payer: OTHER GOVERNMENT

## 2024-07-16 ENCOUNTER — OFFICE VISIT (OUTPATIENT)
Dept: FAMILY MEDICINE | Facility: OTHER | Age: 49
End: 2024-07-16
Attending: PHYSICIAN ASSISTANT
Payer: OTHER GOVERNMENT

## 2024-07-16 VITALS
OXYGEN SATURATION: 99 % | DIASTOLIC BLOOD PRESSURE: 86 MMHG | RESPIRATION RATE: 20 BRPM | SYSTOLIC BLOOD PRESSURE: 122 MMHG | BODY MASS INDEX: 15.45 KG/M2 | HEART RATE: 102 BPM | HEIGHT: 64 IN | TEMPERATURE: 97.5 F

## 2024-07-16 DIAGNOSIS — F51.01 PRIMARY INSOMNIA: ICD-10-CM

## 2024-07-16 DIAGNOSIS — Z00.00 ROUTINE GENERAL MEDICAL EXAMINATION AT A HEALTH CARE FACILITY: Primary | ICD-10-CM

## 2024-07-16 DIAGNOSIS — E06.3 HYPOTHYROIDISM DUE TO HASHIMOTO'S THYROIDITIS: ICD-10-CM

## 2024-07-16 DIAGNOSIS — R53.81 PHYSICAL DECONDITIONING: ICD-10-CM

## 2024-07-16 DIAGNOSIS — Z71.85 VACCINE COUNSELING: ICD-10-CM

## 2024-07-16 DIAGNOSIS — S72.002A CLOSED FRACTURE OF NECK OF LEFT FEMUR, INITIAL ENCOUNTER (H): ICD-10-CM

## 2024-07-16 DIAGNOSIS — H53.9 VISION CHANGES: ICD-10-CM

## 2024-07-16 DIAGNOSIS — Z13.220 LIPID SCREENING: ICD-10-CM

## 2024-07-16 DIAGNOSIS — M80.08XD AGE-RELATED OSTEOPOROSIS WITH CURRENT PATHOLOGICAL FRACTURE OF VERTEBRA WITH ROUTINE HEALING: ICD-10-CM

## 2024-07-16 DIAGNOSIS — R00.2 PALPITATIONS: ICD-10-CM

## 2024-07-16 DIAGNOSIS — Z53.20 CERVICAL CANCER SCREENING DECLINED: ICD-10-CM

## 2024-07-16 DIAGNOSIS — S32.010G COMPRESSION FRACTURE OF L1 VERTEBRA WITH DELAYED HEALING, SUBSEQUENT ENCOUNTER: ICD-10-CM

## 2024-07-16 DIAGNOSIS — Z91.81 RISK FOR FALLS: ICD-10-CM

## 2024-07-16 DIAGNOSIS — R56.9 SEIZURES (H): ICD-10-CM

## 2024-07-16 DIAGNOSIS — K21.00 GASTROESOPHAGEAL REFLUX DISEASE WITH ESOPHAGITIS, UNSPECIFIED WHETHER HEMORRHAGE: ICD-10-CM

## 2024-07-16 LAB
ALBUMIN SERPL BCG-MCNC: 4.8 G/DL (ref 3.5–5.2)
ALP SERPL-CCNC: 123 U/L (ref 40–150)
ALT SERPL W P-5'-P-CCNC: 25 U/L (ref 0–50)
ANION GAP SERPL CALCULATED.3IONS-SCNC: 11 MMOL/L (ref 7–15)
AST SERPL W P-5'-P-CCNC: 26 U/L (ref 0–45)
BASOPHILS # BLD AUTO: 0.1 10E3/UL (ref 0–0.2)
BASOPHILS NFR BLD AUTO: 2 %
BILIRUB SERPL-MCNC: 0.2 MG/DL
BUN SERPL-MCNC: 4.8 MG/DL (ref 6–20)
CALCIUM SERPL-MCNC: 9.8 MG/DL (ref 8.8–10.4)
CHLORIDE SERPL-SCNC: 102 MMOL/L (ref 98–107)
CHOLEST SERPL-MCNC: 241 MG/DL
CREAT SERPL-MCNC: 0.52 MG/DL (ref 0.51–0.95)
EGFRCR SERPLBLD CKD-EPI 2021: >90 ML/MIN/1.73M2
EOSINOPHIL # BLD AUTO: 0.1 10E3/UL (ref 0–0.7)
EOSINOPHIL NFR BLD AUTO: 2 %
ERYTHROCYTE [DISTWIDTH] IN BLOOD BY AUTOMATED COUNT: 15.9 % (ref 10–15)
GLUCOSE SERPL-MCNC: 95 MG/DL (ref 70–99)
HCO3 SERPL-SCNC: 26 MMOL/L (ref 22–29)
HCT VFR BLD AUTO: 43.3 % (ref 35–47)
HDLC SERPL-MCNC: 56 MG/DL
HGB BLD-MCNC: 14.2 G/DL (ref 11.7–15.7)
IMM GRANULOCYTES # BLD: 0 10E3/UL
IMM GRANULOCYTES NFR BLD: 0 %
LDLC SERPL CALC-MCNC: 143 MG/DL
LYMPHOCYTES # BLD AUTO: 2.8 10E3/UL (ref 0.8–5.3)
LYMPHOCYTES NFR BLD AUTO: 49 %
MAGNESIUM SERPL-MCNC: 2.1 MG/DL (ref 1.7–2.3)
MCH RBC QN AUTO: 27.9 PG (ref 26.5–33)
MCHC RBC AUTO-ENTMCNC: 32.8 G/DL (ref 31.5–36.5)
MCV RBC AUTO: 85 FL (ref 78–100)
MONOCYTES # BLD AUTO: 0.4 10E3/UL (ref 0–1.3)
MONOCYTES NFR BLD AUTO: 8 %
NEUTROPHILS # BLD AUTO: 2.3 10E3/UL (ref 1.6–8.3)
NEUTROPHILS NFR BLD AUTO: 40 %
NONHDLC SERPL-MCNC: 185 MG/DL
NRBC # BLD AUTO: 0 10E3/UL
NRBC BLD AUTO-RTO: 0 /100
PLATELET # BLD AUTO: 431 10E3/UL (ref 150–450)
POTASSIUM SERPL-SCNC: 4.6 MMOL/L (ref 3.4–5.3)
PROT SERPL-MCNC: 7.7 G/DL (ref 6.4–8.3)
RBC # BLD AUTO: 5.09 10E6/UL (ref 3.8–5.2)
SODIUM SERPL-SCNC: 139 MMOL/L (ref 135–145)
T4 FREE SERPL-MCNC: 1.21 NG/DL (ref 0.9–1.7)
TRIGL SERPL-MCNC: 208 MG/DL
TSH SERPL DL<=0.005 MIU/L-ACNC: 0.93 UIU/ML (ref 0.3–4.2)
WBC # BLD AUTO: 5.8 10E3/UL (ref 4–11)

## 2024-07-16 PROCEDURE — 84439 ASSAY OF FREE THYROXINE: CPT | Mod: ZL | Performed by: PHYSICIAN ASSISTANT

## 2024-07-16 PROCEDURE — 83735 ASSAY OF MAGNESIUM: CPT | Mod: ZL | Performed by: PHYSICIAN ASSISTANT

## 2024-07-16 PROCEDURE — 80053 COMPREHEN METABOLIC PANEL: CPT | Mod: ZL | Performed by: PHYSICIAN ASSISTANT

## 2024-07-16 PROCEDURE — 85049 AUTOMATED PLATELET COUNT: CPT | Mod: ZL | Performed by: PHYSICIAN ASSISTANT

## 2024-07-16 PROCEDURE — 84443 ASSAY THYROID STIM HORMONE: CPT | Mod: ZL | Performed by: PHYSICIAN ASSISTANT

## 2024-07-16 PROCEDURE — 36415 COLL VENOUS BLD VENIPUNCTURE: CPT | Mod: ZL | Performed by: PHYSICIAN ASSISTANT

## 2024-07-16 PROCEDURE — 99215 OFFICE O/P EST HI 40 MIN: CPT | Mod: 25 | Performed by: PHYSICIAN ASSISTANT

## 2024-07-16 PROCEDURE — 99396 PREV VISIT EST AGE 40-64: CPT | Performed by: PHYSICIAN ASSISTANT

## 2024-07-16 PROCEDURE — 80061 LIPID PANEL: CPT | Mod: ZL | Performed by: PHYSICIAN ASSISTANT

## 2024-07-16 RX ORDER — ACETAMINOPHEN 325 MG/1
650 TABLET ORAL DAILY
Qty: 2 TABLET | Refills: 0 | Status: SHIPPED | OUTPATIENT
Start: 2024-07-16

## 2024-07-16 RX ORDER — OMEPRAZOLE 20 MG/1
20 TABLET, DELAYED RELEASE ORAL DAILY
Qty: 90 TABLET | Refills: 3 | Status: SHIPPED | OUTPATIENT
Start: 2024-07-16

## 2024-07-16 RX ORDER — CYCLOBENZAPRINE HCL 5 MG
5 TABLET ORAL 3 TIMES DAILY PRN
Qty: 90 TABLET | Refills: 11 | Status: SHIPPED | OUTPATIENT
Start: 2024-07-16 | End: 2024-08-21

## 2024-07-16 RX ORDER — LEVOTHYROXINE SODIUM 50 UG/1
TABLET ORAL
Qty: 90 TABLET | Refills: 3 | Status: SHIPPED | OUTPATIENT
Start: 2024-07-16

## 2024-07-16 RX ORDER — LANOLIN ALCOHOL/MO/W.PET/CERES
3 CREAM (GRAM) TOPICAL
Qty: 90 TABLET | Refills: 3 | Status: SHIPPED | OUTPATIENT
Start: 2024-07-16

## 2024-07-16 RX ORDER — LAMOTRIGINE 200 MG/1
200 TABLET ORAL 2 TIMES DAILY
Qty: 180 TABLET | Refills: 3 | Status: SHIPPED | OUTPATIENT
Start: 2024-07-16

## 2024-07-16 RX ORDER — LACOSAMIDE 100 MG/1
100 TABLET ORAL 2 TIMES DAILY
Qty: 180 TABLET | Refills: 3 | Status: SHIPPED | OUTPATIENT
Start: 2024-07-16

## 2024-07-16 SDOH — HEALTH STABILITY: PHYSICAL HEALTH: ON AVERAGE, HOW MANY MINUTES DO YOU ENGAGE IN EXERCISE AT THIS LEVEL?: 60 MIN

## 2024-07-16 SDOH — HEALTH STABILITY: PHYSICAL HEALTH: ON AVERAGE, HOW MANY DAYS PER WEEK DO YOU ENGAGE IN MODERATE TO STRENUOUS EXERCISE (LIKE A BRISK WALK)?: 7 DAYS

## 2024-07-16 ASSESSMENT — ANXIETY QUESTIONNAIRES
3. WORRYING TOO MUCH ABOUT DIFFERENT THINGS: NOT AT ALL
IF YOU CHECKED OFF ANY PROBLEMS ON THIS QUESTIONNAIRE, HOW DIFFICULT HAVE THESE PROBLEMS MADE IT FOR YOU TO DO YOUR WORK, TAKE CARE OF THINGS AT HOME, OR GET ALONG WITH OTHER PEOPLE: NOT DIFFICULT AT ALL
2. NOT BEING ABLE TO STOP OR CONTROL WORRYING: NOT AT ALL
GAD7 TOTAL SCORE: 4
4. TROUBLE RELAXING: MORE THAN HALF THE DAYS
7. FEELING AFRAID AS IF SOMETHING AWFUL MIGHT HAPPEN: NOT AT ALL
8. IF YOU CHECKED OFF ANY PROBLEMS, HOW DIFFICULT HAVE THESE MADE IT FOR YOU TO DO YOUR WORK, TAKE CARE OF THINGS AT HOME, OR GET ALONG WITH OTHER PEOPLE?: NOT DIFFICULT AT ALL
GAD7 TOTAL SCORE: 4
GAD7 TOTAL SCORE: 4
6. BECOMING EASILY ANNOYED OR IRRITABLE: NOT AT ALL
5. BEING SO RESTLESS THAT IT IS HARD TO SIT STILL: MORE THAN HALF THE DAYS
7. FEELING AFRAID AS IF SOMETHING AWFUL MIGHT HAPPEN: NOT AT ALL
1. FEELING NERVOUS, ANXIOUS, OR ON EDGE: NOT AT ALL

## 2024-07-16 ASSESSMENT — PATIENT HEALTH QUESTIONNAIRE - PHQ9
SUM OF ALL RESPONSES TO PHQ QUESTIONS 1-9: 0
10. IF YOU CHECKED OFF ANY PROBLEMS, HOW DIFFICULT HAVE THESE PROBLEMS MADE IT FOR YOU TO DO YOUR WORK, TAKE CARE OF THINGS AT HOME, OR GET ALONG WITH OTHER PEOPLE: NOT DIFFICULT AT ALL
SUM OF ALL RESPONSES TO PHQ QUESTIONS 1-9: 0

## 2024-07-16 ASSESSMENT — SOCIAL DETERMINANTS OF HEALTH (SDOH): HOW OFTEN DO YOU GET TOGETHER WITH FRIENDS OR RELATIVES?: MORE THAN THREE TIMES A WEEK

## 2024-07-16 ASSESSMENT — PAIN SCALES - GENERAL: PAINLEVEL: NO PAIN (0)

## 2024-07-16 NOTE — NURSING NOTE
"Chief Complaint   Patient presents with    Physical       Initial /86 (BP Location: Right arm, Patient Position: Sitting, Cuff Size: Adult Regular)   Pulse 102   Temp 97.5  F (36.4  C) (Temporal)   Resp 20   Ht 1.626 m (5' 4\")   LMP 01/01/2013 (Within Years)   SpO2 99%   BMI 15.45 kg/m   Estimated body mass index is 15.45 kg/m  as calculated from the following:    Height as of this encounter: 1.626 m (5' 4\").    Weight as of 3/4/24: 40.8 kg (90 lb).  Medication Review: complete    The next two questions are to help us understand your food security.  If you are feeling you need any assistance in this area, we have resources available to support you today.          7/16/2024   SDOH- Food Insecurity   Within the past 12 months, did you worry that your food would run out before you got money to buy more? N   Within the past 12 months, did the food you bought just not last and you didn t have money to get more? N            Health Care Directive:  Patient has a Health Care Directive on file      Juan Carlos Benoit      "

## 2024-07-16 NOTE — LETTER
July 17, 2024      Sara DOWLING Armendariz  [address] [facility] [address]        Dear ,    We are writing to inform you of your test results.    Excellent improvement on labs! Blood counts are greatly improved since check last. TSH and T4 levels for thyroid are normal. Magnesium is normal. CMP: electrolytes (sodium, potassium, glucose, chloride, calcium), kidney function (GFR, creatinine), liver function (Alkaline phosphatase, AST, ALT) are all normal - great improvement. Cholesterol levels a touch high, but she is also non fasting. Recommend lifestyle modifications to help with these. Recommend increased lean proteins, fruits and vegetable intake. Goal exercise 150 minutes of moderate exercise per week (walking is great exercise, but work up to this with therapy and exercise).     Let's see each other back in 3-months for a recheck - sooner if questions/concerns arise.     Resulted Orders   TSH   Result Value Ref Range    TSH 0.93 0.30 - 4.20 uIU/mL   T4, Free   Result Value Ref Range    Free T4 1.21 0.90 - 1.70 ng/dL   Magnesium   Result Value Ref Range    Magnesium 2.1 1.7 - 2.3 mg/dL   Comprehensive Metabolic Panel   Result Value Ref Range    Sodium 139 135 - 145 mmol/L    Potassium 4.6 3.4 - 5.3 mmol/L    Carbon Dioxide (CO2) 26 22 - 29 mmol/L    Anion Gap 11 7 - 15 mmol/L    Urea Nitrogen 4.8 (L) 6.0 - 20.0 mg/dL    Creatinine 0.52 0.51 - 0.95 mg/dL    GFR Estimate >90 >60 mL/min/1.73m2      Comment:      eGFR calculated using 2021 CKD-EPI equation.    Calcium 9.8 8.8 - 10.4 mg/dL      Comment:      Reference intervals for this test were updated on 7/16/2024 to reflect our healthy population more accurately. There may be differences in the flagging of prior results with similar values performed with this method. Those prior results can be interpreted in the context of the updated reference intervals.    Chloride 102 98 - 107 mmol/L    Glucose 95 70 - 99 mg/dL    Alkaline  Patient presents with severe cognitive lingusitic deficits evidenced by SLUMS Assessment score of 9/30, which suggests dementia. Patient demonstrated increased difficulty for tasks requiring orientation awareness, thought organization, calculations, generative naming, visuospatial skills, and stm recall. Throughout assessment, patient frequently exhibited expressive aphasia, which impacted patient's ability to communicate her with commu nication partner. Patient had difficulty recalling biographical information without assistance from son, Asif Hays, who lives with patient. Son reported patient frequently has difficulty with communicating thoughts in conversation, wandering at night with some attempts to leave the house, and frequent difficulty with sequencing for daily cognitive tasks. Patient would benefit from 1wk3 to target cognitive lingusitic deficits by implementing c ognitive staging, communication supports, visual aids, and caregiver education to improve safety in home, participation in daily conversations and cognitive communication tasks.     Thank you for the referral! Phosphatase 123 40 - 150 U/L    AST 26 0 - 45 U/L    ALT 25 0 - 50 U/L    Protein Total 7.7 6.4 - 8.3 g/dL    Albumin 4.8 3.5 - 5.2 g/dL    Bilirubin Total 0.2 <=1.2 mg/dL   CBC with platelets and differential   Result Value Ref Range    WBC Count 5.8 4.0 - 11.0 10e3/uL    RBC Count 5.09 3.80 - 5.20 10e6/uL    Hemoglobin 14.2 11.7 - 15.7 g/dL    Hematocrit 43.3 35.0 - 47.0 %    MCV 85 78 - 100 fL    MCH 27.9 26.5 - 33.0 pg    MCHC 32.8 31.5 - 36.5 g/dL    RDW 15.9 (H) 10.0 - 15.0 %    Platelet Count 431 150 - 450 10e3/uL    % Neutrophils 40 %    % Lymphocytes 49 %    % Monocytes 8 %    % Eosinophils 2 %    % Basophils 2 %    % Immature Granulocytes 0 %    NRBCs per 100 WBC 0 <1 /100    Absolute Neutrophils 2.3 1.6 - 8.3 10e3/uL    Absolute Lymphocytes 2.8 0.8 - 5.3 10e3/uL    Absolute Monocytes 0.4 0.0 - 1.3 10e3/uL    Absolute Eosinophils 0.1 0.0 - 0.7 10e3/uL    Absolute Basophils 0.1 0.0 - 0.2 10e3/uL    Absolute Immature Granulocytes 0.0 <=0.4 10e3/uL    Absolute NRBCs 0.0 10e3/uL   Lipid Panel   Result Value Ref Range    Cholesterol 241 (H) <200 mg/dL    Triglycerides 208 (H) <150 mg/dL    Direct Measure HDL 56 >=50 mg/dL    LDL Cholesterol Calculated 143 (H) <=100 mg/dL    Non HDL Cholesterol 185 (H) <130 mg/dL    Narrative    Cholesterol  Desirable:  <200 mg/dL    Triglycerides  Normal:  Less than 150 mg/dL  Borderline High:  150-199 mg/dL  High:  200-499 mg/dL  Very High:  Greater than or equal to 500 mg/dL    Direct Measure HDL  Female:  Greater than or equal to 50 mg/dL   Male:  Greater than or equal to 40 mg/dL    LDL Cholesterol  Desirable:  <100mg/dL  Above Desirable:  100-129 mg/dL   Borderline High:  130-159 mg/dL   High:  160-189 mg/dL   Very High:  >= 190 mg/dL    Non HDL Cholesterol  Desirable:  130 mg/dL  Above Desirable:  130-159 mg/dL  Borderline High:  160-189 mg/dL  High:  190-219 mg/dL  Very High:  Greater than or equal to 220 mg/dL       If you have any questions or concerns, please call the  clinic at the number listed above.       Sincerely,      Tammy Webber PA-C

## 2024-07-16 NOTE — PATIENT INSTRUCTIONS
Lab work on average will return in 1-2 hours, unless listed otherwise below (your provider will typically review & provide you with results and recommendations within 1 day):  - CBC - blood counts  - CMP/BMP - kidney and electrolyte lab. CMP adds in liver function  - Magnesium  - TSH and T4 - thyroid lab    Patient Education   Preventive Care Advice   This is general advice given by our system to help you stay healthy. However, your care team may have specific advice just for you. Please talk to your care team about your preventive care needs.  Nutrition  Eat 5 or more servings of fruits and vegetables each day.  Try wheat bread, brown rice and whole grain pasta (instead of white bread, rice, and pasta).  Get enough calcium and vitamin D. Check the label on foods and aim for 100% of the RDA (recommended daily allowance).  Lifestyle  Exercise at least 150 minutes each week  (30 minutes a day, 5 days a week).  Do muscle strengthening activities 2 days a week. These help control your weight and prevent disease.  No smoking.  Wear sunscreen to prevent skin cancer.  Have a dental exam and cleaning every 6 months.  Yearly exams  See your health care team every year to talk about:  Any changes in your health.  Any medicines your care team has prescribed.  Preventive care, family planning, and ways to prevent chronic diseases.  Shots (vaccines)   HPV shots (up to age 26), if you've never had them before.  Hepatitis B shots (up to age 59), if you've never had them before.  COVID-19 shot: Get this shot when it's due.  Flu shot: Get a flu shot every year.  Tetanus shot: Get a tetanus shot every 10 years.  Pneumococcal, hepatitis A, and RSV shots: Ask your care team if you need these based on your risk.  Shingles shot (for age 50 and up)  General health tests  Diabetes screening:  Starting at age 35, Get screened for diabetes at least every 3 years.  If you are younger than age 35, ask your care team if you should be screened  for diabetes.  Cholesterol test: At age 39, start having a cholesterol test every 5 years, or more often if advised.  Bone density scan (DEXA): At age 50, ask your care team if you should have this scan for osteoporosis (brittle bones).  Hepatitis C: Get tested at least once in your life.  STIs (sexually transmitted infections)  Before age 24: Ask your care team if you should be screened for STIs.  After age 24: Get screened for STIs if you're at risk. You are at risk for STIs (including HIV) if:  You are sexually active with more than one person.  You don't use condoms every time.  You or a partner was diagnosed with a sexually transmitted infection.  If you are at risk for HIV, ask about PrEP medicine to prevent HIV.  Get tested for HIV at least once in your life, whether you are at risk for HIV or not.  Cancer screening tests  Cervical cancer screening: If you have a cervix, begin getting regular cervical cancer screening tests starting at age 21.  Breast cancer scan (mammogram): If you've ever had breasts, begin having regular mammograms starting at age 40. This is a scan to check for breast cancer.  Colon cancer screening: It is important to start screening for colon cancer at age 45.  Have a colonoscopy test every 10 years (or more often if you're at risk) Or, ask your provider about stool tests like a FIT test every year or Cologuard test every 3 years.  To learn more about your testing options, visit:   .  For help making a decision, visit:   https://bit.ly/gq30861.  Prostate cancer screening test: If you have a prostate, ask your care team if a prostate cancer screening test (PSA) at age 55 is right for you.  Lung cancer screening: If you are a current or former smoker ages 50 to 80, ask your care team if ongoing lung cancer screenings are right for you.  For informational purposes only. Not to replace the advice of your health care provider. Copyright   2023 [employer]. All rights reserved.  Clinically reviewed by the [facility]. [employer] 028891 - REV 01/24.  Preventing Falls: Care Instructions  Injuries and health problems such as trouble walking or poor eyesight can increase your risk of falling. So can some medicines. But there are things you can do to help prevent falls. You can exercise to get stronger. You can also arrange your home to make it safer.    Talk to your doctor about the medicines you take. Ask if any of them increase the risk of falls and whether they can be changed or stopped.   Try to exercise regularly. It can help improve your strength and balance. This can help lower your risk of falling.     Practice fall safety and prevention.    Wear low-heeled shoes that fit well and give your feet good support. Talk to your doctor if you have foot problems that make this hard.  Carry a cellphone or wear a medical alert device that you can use to call for help.  Use stepladders instead of chairs to reach high objects. Don't climb if you're at risk for falls. Ask for help, if needed.  Wear the correct eyeglasses, if you need them.    Make your home safer.    Remove rugs, cords, clutter, and furniture from walkways.  Keep your house well lit. Use night-lights in hallways and bathrooms.  Install and use sturdy handrails on stairways.  Wear nonskid footwear, even inside. Don't walk barefoot or in socks without shoes.    Be safe outside.    Use handrails, curb cuts, and ramps whenever possible.  Keep your hands free by using a shoulder bag or backpack.  Try to walk in well-lit areas. Watch out for uneven ground, changes in pavement, and debris.  Be careful in the winter. Walk on the grass or gravel when sidewalks are slippery. Use de-icer on steps and walkways. Add non-slip devices to shoes.    Put grab bars and nonskid mats in your shower or tub and near the toilet. Try to use a shower chair or bath bench when bathing.   Get into a tub or shower by putting in your  "weaker leg first. Get out with your strong side first. Have a phone or medical alert device in the bathroom with you.   Where can you learn more?  Go to https://www.[employer].net/patiented  Enter G117 in the search box to learn more about \"Preventing Falls: Care Instructions.\"  Current as of: July 17, 2023               Content Version: 14.0    [id number] [employer].   Care instructions adapted under license by your healthcare professional. If you have questions about a medical condition or this instruction, always ask your healthcare professional. [employer] disclaims any warranty or liability for your use of this information.         "

## 2024-07-16 NOTE — PROGRESS NOTES
Preventive Care Visit  [facility]  Tammy Webber PA-C, Family Medicine  Jul 16, 2024      Assessment & Plan       ICD-10-CM    1. Routine general medical examination at a health care facility  Z00.00       2. Seizures (H)  R56.9 Adult Neurology  Referral     [facility] Order for DME - ONLY FOR DME     Primary Care - Care Coordination Referral     Lacosamide (VIMPAT) 100 MG TABS tablet     lamoTRIgine (LAMICTAL) 200 MG tablet     MR Brain w/o & w Contrast     CANCELED: MR Brain and Orbits w/o & w Contrast      3. Vision changes  H53.9 Adult Neurology  Referral     Primary Care - Care Coordination Referral     MR Brain w/o & w Contrast     CANCELED: MR Brain and Orbits w/o & w Contrast      4. Physical deconditioning  R53.81 Physical Therapy  Referral     Primary Care - Care Coordination Referral      5. Age-related osteoporosis with current pathological fracture of vertebra with routine healing  M80.08XD Physical Therapy  Referral     Primary Care - Care Coordination Referral     acetaminophen (TYLENOL) 325 MG tablet      6. Compression fracture of L1 vertebra with delayed healing, subsequent encounter  S32.010G Physical Therapy  Referral     Primary Care - Care Coordination Referral     cyclobenzaprine (FLEXERIL) 5 MG tablet      7. Closed fracture of neck of left femur, initial encounter (H)  S72.002A Physical Therapy  Referral     Primary Care - Care Coordination Referral      8. Risk for falls  Z91.81 [facility] Order for DME - ONLY FOR DME     Primary Care - Care Coordination Referral      9. Hypothyroidism due to Hashimoto's thyroiditis  E03.8 levothyroxine (SYNTHROID/LEVOTHROID) 50 MCG tablet    E06.3       10. Primary insomnia  F51.01 melatonin 3 MG tablet      11. Gastroesophageal reflux disease with esophagitis, unspecified whether hemorrhage  K21.00 omeprazole (PRILOSEC OTC) 20 MG EC tablet      12. Palpitations  R00.2 TSH     T4,  Free     CBC and Differential     Magnesium     Comprehensive Metabolic Panel     TSH     T4, Free     CBC and Differential     Magnesium     Comprehensive Metabolic Panel      13. Lipid screening  Z13.220 Lipid Panel      14. Vaccine counseling  Z71.85       15. Cervical cancer screening declined  Z53.20         Annual physical completed today, reviewed care gaps, see below for additional recommendations.  Last mammogram completed on 12/26/2023, this is normal, recommend to continue with screening every 1 to 2 years, sooner if presenting symptoms arise.  Advance care planning up-to-date.  Last tetanus administered on 8/29/2023, due for update in 10 years.  Recommend updated colon cancer screening, due to comorbid conditions, this has been put on hold, recommended completed Cologuard, she will let me know if she would like a new order in regards to this.  Seizures, previously followed with provider team in [place], hoping to transfer her care more locally.  I did review that unfortunately, we do not have any in-house neurologist, our care team consists of Dr. Andre MD of [facility] and Dr. Chin of [facility] -both of these providers travel to our clinic/hospital setting and are here on a variable basis.  Discussed that [facility] and [facility] have a robust neurology team as well.  She would prefer to try to transition her care to Dr. Powell, referral placed.  Refill current medications including Vimpat, Lamictal -no recent seizures, aware of return precautions.  Vision changes, intermittent in nature, recommend formal ophthalmologic examination, she will work on scheduling this.  Due to previous history of abnormal brain MRI and CT scans, recommend update of brain MRI including orbits, both with and without contrast, this is ordered, she will be contacted to schedule.  Return precautions reviewed.  Physical deconditioning, secondary to recent hip fracture, recently discharged from hospice, she  would benefit from physical therapy for strengthening and conditioning, Sara is interested in this. Referral placed to [facility] physical therapy department, she will be contacted to schedule.  In the interim, recommend to continue with light exercises (utilize precaution) at the [facility].  She is also interested in care coordination referral was to physical deconditioning, she has hopes of getting out on her own and obtaining her own apartment (currently living with mother) did also like to access this information in regards to transportation mother local resources, she is strongly interested in a referral to care coordination.  Age-related osteoporosis fracture of L1, this is chronic in nature and healed.  Would benefit from physical therapy for strengthening conditioning secondary to physical deconditioning secondary to fracture.  Referral placed to physical therapy, kindly appreciate their assistance in patient care.  Healed compression fracture of L1 vertebrae, in addition to hip fracture, this is partial etiology to physical deconditioning and strength.  She is a high fall risk due to physical deconditioning, recommend physical therapy.  Would prefer to attend physical therapy sessions in person versus home care.  Healed closed fracture of left femur, discharged home, doing well, goal to complete activities of daily living and to get outside the house and become more physically active.  Physical therapy would help benefit this, physical therapy referral placed, she will be contacted to schedule. Refill Flexeril for PRN use. Do not drive or operate machinery with use of medication. Aware may make her more sleepy, aware of fall risk, however, as this greatly improves quality of life, we opted to continue. If recurrent falls, would recommend to discontinue.   Risk for falls, would like to have life alert available for precaution, DME order placed to [facility] in [place], Sara will be further contacted in  regards to this.  Hypothyroidism, TSH normal 0.93, T4 stable 1.21. Please take your Levothyroxine (Synthroid) first thing in the morning on an empty stomach - this helps with absorption. Wait 30-60 minutes prior to eating, drinking and/or taking other medications. It is important to take Levothyroxine (Synthroid) at the same time everyday. Continue 50 mcg daily.   Primary insomnia, reviewed sleep hygiene regimen. Stable on Melatonin 3 mg nightly. Discussed risks, benefits and side effects of medication at length with Sara.   GERD, stable on current regimen of PPI therapy as well as lifestyle modifications.  Recommend to limit triggering agents including NSAIDs, greasy, acidic, spicy foods, alcohol and caffeine.  May also benefit from elevating head of the bed to 30 degrees to help with drainage/acid production.  Palpitations, likely due to caffeine, she does consume 6 to 8 cups of coffee a day, may also have a component of stress.  Lab work negative for anemia, offered EKG and Zio patch, she would prefer to hold off at this time, return precautions reviewed.  Nonfasting lipid panel completed today. Cholesterol, triglycerides, HDL, LDL in process.   Vaccine counseling provided today in regards to hepatitis B, shingles, COVID booster.  I would recommend that Noelle proceed with the series of immunizations due to comorbid health conditions/immunocompromise status, she is at higher risk.  She will consider the above immunization series.  Cervical cancer screening, this was discussed at length today, she currently defers.  Aware of risks of declining cancer screening.    Counseling  Appropriate preventive services were discussed with this patient, including applicable screening as appropriate for fall prevention, nutrition, physical activity, Tobacco-use cessation, weight loss and cognition.  Checklist reviewing preventive services available has been given to the patient.  Reviewed patient's diet, addressing concerns  and/or questions.     40 minutes spent directly face to face on patient care.     The 10-year ASCVD risk score (Kenn SAMAYOA, et al., 2019) is: 1.5%    Values used to calculate the score:      Age: 49 years      Sex: Female      Is Non- : No      Diabetic: No      Tobacco smoker: No      Systolic Blood Pressure: 122 mmHg      Is BP treated: No      HDL Cholesterol: 56 mg/dL      Total Cholesterol: 241 mg/dL    See Patient Instructions    Return in about 3 months (around 10/16/2024) for Hip, back, seizures, GERD.    Yossi Ruiz is a 49 year old, presenting for the following:  Physical        7/16/2024     2:57 PM   Additional Questions   Roomed by Juan Carlos CORDERO   Accompanied by self     Health Care Directive  Patient has a Health Care Directive on file  Advance care planning document is on file and is current.    LILIAN Ruiz presents to the clinic today for annual physical:  She would like help with local resources including housing, she would like to move out of her mother's home and become more physically independent.  She is unsure of any local facilities accepting tenants.  She was also like more information on transportation and other local resources.  Cervical cancer screening, currently defers this today.  Declines any vaginal pain, discharge and or bleeding.  She will think about obtaining a Pap smear later date.  Colon cancer screening, with provided an order for colon cancer screening to be completed in December 2023, due to hip fracture and osteoporotic fracture, this was delayed, recommend she complete this, if new cologuard order is needed, she will call.   Neurology, history of seizures, continues on lamotrigine and Vimpat.  No recent history of seizures.  Currently follows in [place], looking for a more local option due to travel restrictions and mobility.  Would prefer to be seen by grand Scott.  Does have intermittent vision changes, secondary to previous TBI and head  injury, these have been slightly more progressive lately with intermittent headaches, wondering about obtaining an MRI.  Has not yet seen an eye doctor for vision changes.  Declines flashers or floaters and or double vision.  Physical health, would like to start physical therapy for strength and balance.  She recently started working out at the [facility] with light weights last week with her mother, she is quite eager to be outside the house but with particular strength as she is worried about falling.  She would also like an order for life alert.  Her goal is to begin independent with cooking, cleaning other activities of daily living.  She declines any new falls, injuries and/or trauma.  She does have intermittent left hip pain over the outside portion of her hip which is worse with weather changes.  Of note, was admitted from 3/4/2024 to 3/18/2024 for L1 compression fracture.  Feels she continues to make strides towards improvement.  She is very happy to no longer be in her TLSO brace.  Sustained a closed displaced subtrochanteric fracture of the left femur in December 2023.  She was transferred to [facility] for [facility] care.  She underwent operative repair of fracture -including intertrochanteric fixation (intramedullary melva and femoral neck screw).  She also had a fall on 11/8/2023 and sustained right-sided superior and inferior pubic rami fractures, she followed up with sports medicine/orthopedics in regards to this.  Acid reflux, doing well on omeprazole, would like this refilled.  Continues to work hard on limiting triggering agents but does struggle to get up coffee.  Intermittent heart palpitations, worse with the increased amount of coffee that she consumes.  Typically has 6 to 8 cups of coffee a day.  Declines any recent life changes or stressors, she is much more happy now that she is back at home.  Declines any chest pain, shortness of breath, centralized or peripheral edema, headaches or other  pertinent symptomatology.  Attributes to caffeine.  Weight, she continues to work with dietitian, she was previously 87 pounds in early 2024 -she states she has been progressively gaining weight, she is unsure of her current weight but notes that she is likely over 100 pounds, she is very happy with her weight improvement.  She states she is working hard on increasing her protein intake.    Hypothyroidism Follow-up    Since last visit, patient describes the following symptoms: Weight stable, no hair loss, no skin changes, no constipation, no loose stools      7/16/2024   General Health   How would you rate your overall physical health? Good   Feel stress (tense, anxious, or unable to sleep) Patient declined          7/16/2024   Nutrition   Three or more servings of calcium each day? Yes   Diet: Vegetarian/vegan   How many servings of fruit and vegetables per day? (!) 2-3   How many sweetened beverages each day? 0-1            7/16/2024   Exercise   Days per week of moderate/strenous exercise 7 days   Average minutes spent exercising at this level 60 min            7/16/2024   Social Factors   Frequency of gathering with friends or relatives More than three times a week   Worry food won't last until get money to buy more No   Food not last or not have enough money for food? No   Do you have housing? (Housing is defined as stable permanent housing and does not include staying ouside in a car, in a tent, in an abandoned building, in an overnight shelter, or couch-surfing.) No   Are you worried about losing your housing? No   Lack of transportation? No   Unable to get utilities (heat,electricity)? No   Want help with housing or utility concern? (!) YES      (!) HOUSING CONCERN PRESENT        7/16/2024   Dental   Dentist two times every year? Yes            7/16/2024   TB Screening   Were you born outside of the [place]? No      Today's PHQ-9 Score:       7/16/2024     2:33 PM   PHQ-9 SCORE   PHQ-9 Total Score MyChart 0    PHQ-9 Total Score 0         7/16/2024   Substance Use   Alcohol more than 3/day or more than 7/wk Not Applicable   Do you use any other substances recreationally? No     Social History     Tobacco Use    Smoking status: Never     Passive exposure: Never    Smokeless tobacco: Never   Vaping Use    Vaping status: Never Used   Substance Use Topics    Alcohol use: Yes     Comment: Alcoholic Drinks/day: Occassional    Drug use: No     Comment: Drug use: No           12/26/2023   LAST FHS-7 RESULTS   1st degree relative breast or ovarian cancer No   Any relative bilateral breast cancer No   Any male have breast cancer No   Any ONE woman have BOTH breast AND ovarian cancer No   Any woman with breast cancer before 50yrs No   2 or more relatives with breast AND/OR ovarian cancer No   2 or more relatives with breast AND/OR bowel cancer No      Mammogram Screening - Mammogram every 1-2 years updated in Health Maintenance based on mutual decision making        7/16/2024   One time HIV Screening   Previous HIV test? No          7/16/2024   STI Screening   New sexual partner(s) since last STI/HIV test? No      History of abnormal Pap smear: No - age 30- 64 PAP with HPV every 5 years recommended    ASCVD Risk   The 10-year ASCVD risk score (Kenn SAMAYOA, et al., 2019) is: 1.5%    Values used to calculate the score:      Age: 49 years      Sex: Female      Is Non- : No      Diabetic: No      Tobacco smoker: No      Systolic Blood Pressure: 122 mmHg      Is BP treated: No      HDL Cholesterol: 56 mg/dL      Total Cholesterol: 241 mg/dL    Reviewed and updated as needed this visit by Provider   Tobacco  Allergies  Meds  Problems  Med Hx  Surg Hx  Fam Hx            Past Medical History:   Diagnosis Date    Anorexia     Atypical depression     Hypothyroidism     Intracranial hemorrhage (H) 2018    Osteopenia     PTSD (post-traumatic stress disorder)     Seizure disorder (H)     TBI (traumatic  "brain injury) (H) 05/2018     Past Surgical History:   Procedure Laterality Date    CRANIOPLASTY  08/08/2018    CRANIOTOMY  05/2018    TRACHEOSTOMY  06/2018     Review of Systems  Constitutional, HEENT, cardiovascular, pulmonary, GI, , musculoskeletal, neuro, skin, endocrine and psych systems are negative, except as otherwise noted.     Objective    Exam  /86 (BP Location: Right arm, Patient Position: Sitting, Cuff Size: Adult Regular)   Pulse 102   Temp 97.5  F (36.4  C) (Temporal)   Resp 20   Ht 1.626 m (5' 4\")   LMP 01/01/2013 (Within Years)   SpO2 99%   BMI 15.45 kg/m     Estimated body mass index is 15.45 kg/m  as calculated from the following:    Height as of this encounter: 1.626 m (5' 4\").    Weight as of 3/4/24: 40.8 kg (90 lb).    Physical Exam  GENERAL: alert and no distress  EYES: Eyes grossly normal to inspection  NECK: no adenopathy, no asymmetry, masses, or scars  RESP: lungs clear to auscultation - no rales, rhonchi or wheezes  CV: regular rate and rhythm, normal S1 S2, no S3 or S4, no murmur, click or rub, no peripheral edema  ABDOMEN: soft, nontender, no hepatosplenomegaly, no masses and bowel sounds normal  MS: sitting upright in wheelchair, comfortable. Pain over lateral hip over trochanteric bursa. Declines hip range of motion examination   SKIN: no suspicious lesions or rashes  NEURO: Normal strength and tone, mentation intact and speech normal  PSYCH: mentation appears normal, affect normal/bright  LYMPH: normal ant/post cervical, supraclavicular nodes    Results for orders placed or performed in visit on 07/16/24   TSH     Status: Normal   Result Value Ref Range    TSH 0.93 0.30 - 4.20 uIU/mL   T4, Free     Status: Normal   Result Value Ref Range    Free T4 1.21 0.90 - 1.70 ng/dL   Magnesium     Status: Normal   Result Value Ref Range    Magnesium 2.1 1.7 - 2.3 mg/dL   Comprehensive Metabolic Panel     Status: Abnormal   Result Value Ref Range    Sodium 139 135 - 145 mmol/L    " Potassium 4.6 3.4 - 5.3 mmol/L    Carbon Dioxide (CO2) 26 22 - 29 mmol/L    Anion Gap 11 7 - 15 mmol/L    Urea Nitrogen 4.8 (L) 6.0 - 20.0 mg/dL    Creatinine 0.52 0.51 - 0.95 mg/dL    GFR Estimate >90 >60 mL/min/1.73m2    Calcium 9.8 8.8 - 10.4 mg/dL    Chloride 102 98 - 107 mmol/L    Glucose 95 70 - 99 mg/dL    Alkaline Phosphatase 123 40 - 150 U/L    AST 26 0 - 45 U/L    ALT 25 0 - 50 U/L    Protein Total 7.7 6.4 - 8.3 g/dL    Albumin 4.8 3.5 - 5.2 g/dL    Bilirubin Total 0.2 <=1.2 mg/dL   CBC with platelets and differential     Status: Abnormal   Result Value Ref Range    WBC Count 5.8 4.0 - 11.0 10e3/uL    RBC Count 5.09 3.80 - 5.20 10e6/uL    Hemoglobin 14.2 11.7 - 15.7 g/dL    Hematocrit 43.3 35.0 - 47.0 %    MCV 85 78 - 100 fL    MCH 27.9 26.5 - 33.0 pg    MCHC 32.8 31.5 - 36.5 g/dL    RDW 15.9 (H) 10.0 - 15.0 %    Platelet Count 431 150 - 450 10e3/uL    % Neutrophils 40 %    % Lymphocytes 49 %    % Monocytes 8 %    % Eosinophils 2 %    % Basophils 2 %    % Immature Granulocytes 0 %    NRBCs per 100 WBC 0 <1 /100    Absolute Neutrophils 2.3 1.6 - 8.3 10e3/uL    Absolute Lymphocytes 2.8 0.8 - 5.3 10e3/uL    Absolute Monocytes 0.4 0.0 - 1.3 10e3/uL    Absolute Eosinophils 0.1 0.0 - 0.7 10e3/uL    Absolute Basophils 0.1 0.0 - 0.2 10e3/uL    Absolute Immature Granulocytes 0.0 <=0.4 10e3/uL    Absolute NRBCs 0.0 10e3/uL   Lipid Panel     Status: Abnormal   Result Value Ref Range    Cholesterol 241 (H) <200 mg/dL    Triglycerides 208 (H) <150 mg/dL    Direct Measure HDL 56 >=50 mg/dL    LDL Cholesterol Calculated 143 (H) <=100 mg/dL    Non HDL Cholesterol 185 (H) <130 mg/dL    Narrative    Cholesterol  Desirable:  <200 mg/dL    Triglycerides  Normal:  Less than 150 mg/dL  Borderline High:  150-199 mg/dL  High:  200-499 mg/dL  Very High:  Greater than or equal to 500 mg/dL    Direct Measure HDL  Female:  Greater than or equal to 50 mg/dL   Male:  Greater than or equal to 40 mg/dL    LDL Cholesterol  Desirable:   <100mg/dL  Above Desirable:  100-129 mg/dL   Borderline High:  130-159 mg/dL   High:  160-189 mg/dL   Very High:  >= 190 mg/dL    Non HDL Cholesterol  Desirable:  130 mg/dL  Above Desirable:  130-159 mg/dL  Borderline High:  160-189 mg/dL  High:  190-219 mg/dL  Very High:  Greater than or equal to 220 mg/dL   CBC and Differential     Status: Abnormal    Narrative    The following orders were created for panel order CBC and Differential.  Procedure                               Abnormality         Status                     ---------                               -----------         ------                     CBC with platelets and d...[[id number]]  Abnormal            Final result                 Please view results for these tests on the individual orders.     Signed Electronically by: Tammy Webber PA-C

## 2024-07-17 ENCOUNTER — TELEPHONE (OUTPATIENT)
Dept: FAMILY MEDICINE | Facility: OTHER | Age: 49
End: 2024-07-17
Payer: OTHER GOVERNMENT

## 2024-07-17 ENCOUNTER — PATIENT OUTREACH (OUTPATIENT)
Dept: CARE COORDINATION | Facility: CLINIC | Age: 49
End: 2024-07-17
Payer: OTHER GOVERNMENT

## 2024-07-17 RX ORDER — ACETAMINOPHEN 325 MG/1
650 TABLET ORAL DAILY
Qty: 180 TABLET | Refills: 4 | OUTPATIENT
Start: 2024-07-17

## 2024-07-17 NOTE — TELEPHONE ENCOUNTER
Reason for call: Request for results.    Name of test or procedure: Labs    Date of test or procedure: 7/16    Location of test or procedure: [facility]    Preferred method for responding to this message: Telephone Call    Phone number patient can be reached at: [phone]      If we can't reach you directly, may we leave a detailed response at the number you provided?Yes

## 2024-07-17 NOTE — PROGRESS NOTES
Clinic Care Coordination Contact    Referral for Care Coordination  by provider, Tammy Webber, for assist with discharge planning/housing; set up community support for complex medical concerns and navigation of resources for independent living.  Patient has personal resources to afford paying privately at this time.     Initiated phone contact with patient today and discussed her current status.  Patient is still currently living at [facility] where she has been since 4/24. Patient had been receiving hospice services but has since graduated off hospice services and reportedly doing quite well. Patient states she is getting out in the community via w/c independently via [employer].     Patient is requesting assist with discharge planning from group home setting to an independent apartment in the community.  Encouraged patient to consider group home apartment at [facility] but patient is not interested in assisted living apartments.    Patient even spoke of going to a different city---such as [place]---to find an appropriate independent apartment if need be.     Writer brought her attention to some area apartments near the [facility] near the [facility] with condo market value independent apartments as well as senior high rises where she could maintain independence.  Benefits include living near trails to enjoy and still live near her mother.     Patient spoke of wanting a [facility] alert button for her Seizure activity and falls. Writer will assist when she finds a location.     Patient requested that writer assist with finding apartments--so will provide her market value numbers of local living complexes. She requested writer text it to  her and writer agreed to do so.     Patient's mother is her Guardian---so writer will be contacting her as well.  Notified patient that writer would be doing so.     Plan:  Ongoing Care Coordination to assess and offer assist with community resources/referrals; ongoing  encouragement, support as needed. Assist with discharge planning from [facility] to independent housing/apartment as needed.     ANDRE West on 7/22/2024 at 5:47 PM

## 2024-07-19 ENCOUNTER — HOSPITAL ENCOUNTER (OUTPATIENT)
Dept: MRI IMAGING | Facility: OTHER | Age: 49
Discharge: HOME | End: 2024-07-19
Attending: PHYSICIAN ASSISTANT
Payer: OTHER GOVERNMENT

## 2024-07-19 DIAGNOSIS — H53.9 VISION CHANGES: ICD-10-CM

## 2024-07-19 DIAGNOSIS — R56.9 SEIZURES (H): ICD-10-CM

## 2024-07-19 PROCEDURE — 70553 MRI BRAIN STEM W/O & W/DYE: CPT

## 2024-07-19 PROCEDURE — 70543 MRI ORBT/FAC/NCK W/O &W/DYE: CPT

## 2024-07-19 PROCEDURE — A9575 INJ GADOTERATE MEGLUMI 0.1ML: HCPCS | Performed by: PHYSICIAN ASSISTANT

## 2024-07-19 PROCEDURE — 255N000002 HC RX 255 OP 636: Performed by: PHYSICIAN ASSISTANT

## 2024-07-19 RX ORDER — GADOTERATE MEGLUMINE 376.9 MG/ML
15 INJECTION INTRAVENOUS ONCE
Status: COMPLETED | OUTPATIENT
Start: 2024-07-19 | End: 2024-07-19

## 2024-07-19 RX ADMIN — GADOTERATE MEGLUMINE 11 ML: 376.9 INJECTION INTRAVENOUS at 12:11

## 2024-07-26 NOTE — PROGRESS NOTES
Clinic Care Coordination Contact    Initiated phone contact with patient's guardian/mother today to check in with her perspective on patient's current status. No answer, so writer left a brief message encouraging her to call writer back to discuss patient status.      Writer spoke with patient herself this week and had alerted her that writer would also be reaching out to her mother/guardian soon.      Will await call back.  If no call back by upcoming Monday, writer will initiate another call.    Plan:  Ongoing Care Coordination to assess and offer assist with community resources/referrals; ongoing encouragement, support as needed.     ANDRE West on 7/26/2024 at 1:51 PM

## 2024-07-29 NOTE — PROGRESS NOTES
"Clinic Care Coordination Contact    Received call back from patient's parent/Guardian, Janki Welch, today.  She responded to writer's message left with her last week in re: patient's status and life planning.    Zoie stated that she is supportive of patient discharging back into community from current placement at [facility]---but would prefer that she stay there alittle longer to complete PT that is pending to start in next couple weeks.  She feels that patient will be at a good baseline then and will know what housing/independent setting she can handle. Zoie will discuss this with her daughter/patient.     Writer gave Zoie phone number for \"[facility] here in [place]---and they also serve the [place] area--as patient had stated to writer she would be willing to move there as well.  She will follow up with patient on this as well. Discussed other market value rentals and we agreed that there were not many seemingly available in community for handicap accessible persons.  Zoie agreed to continue to look around---writer agreed to do so as well.     Zoie stated she did not ever want patient to live with her again as he had earlier this year.  She said that she will move out if her daughter moves in!  However, states that she loves that her daughter visits her home regularly but is glad that she has her own living situation at [facility].  ([facility] is only a few blocks away from Janki's home.)    Janki/Vero expressed concern re: patient abusing laxatives---as she has done in the past.  When patient trends into this behavior, she becomes depleted and weak and deteriorates physically and mentally.  She expressed fear that independent living might prompt this behavior/choice.  Janki/Vero also shared that patient has had a TBI since 2018. She reports that patient has had long standing Obsessive-Compulsive behavior " "component to her personality and decision making that affects her daily living stating \"whatever she does---she goes all out....\"    Janki/Guardian expressed concern re: patient getting a motorized scooter as she feels that this will amplify patient's poor decision making.  She feels that patient is adequately served through \"[facility]\" as well as [facility].  (They do not use \"[facility]\" as they feel that they don't accommodate to picking them up at their specific locations.)    Also reached out to patient via a text giving her the phone number for apartment complex as well.    Will be available to assist with community housing planning for patient as needed.     Plan:  Ongoing Care Coordination to assess and offer assist with community resources/referrals; ongoing encouragement, support as needed. Will reach out to patient in next 30 days to check on PT progress and her discharge planning/apartment search. Remain available to patient and Janki/Guardian/parent.    ANDRE West on 7/29/2024 at 10:19 AM            "

## 2024-07-31 ENCOUNTER — OFFICE VISIT (OUTPATIENT)
Dept: FAMILY MEDICINE | Facility: OTHER | Age: 49
End: 2024-07-31
Attending: NURSE PRACTITIONER
Payer: OTHER GOVERNMENT

## 2024-07-31 VITALS
OXYGEN SATURATION: 94 % | HEART RATE: 88 BPM | WEIGHT: 120 LBS | BODY MASS INDEX: 20.49 KG/M2 | SYSTOLIC BLOOD PRESSURE: 115 MMHG | TEMPERATURE: 98.4 F | DIASTOLIC BLOOD PRESSURE: 83 MMHG | RESPIRATION RATE: 18 BRPM | HEIGHT: 64 IN

## 2024-07-31 DIAGNOSIS — M79.651 PAIN OF RIGHT THIGH: ICD-10-CM

## 2024-07-31 DIAGNOSIS — T24.211A BURN OF RIGHT THIGH, SECOND DEGREE, INITIAL ENCOUNTER: Primary | ICD-10-CM

## 2024-07-31 PROCEDURE — 99213 OFFICE O/P EST LOW 20 MIN: CPT

## 2024-07-31 RX ORDER — SILVER SULFADIAZINE 10 MG/G
CREAM TOPICAL DAILY
Qty: 85 G | Refills: 0 | Status: SHIPPED | OUTPATIENT
Start: 2024-07-31

## 2024-07-31 ASSESSMENT — PAIN SCALES - GENERAL: PAINLEVEL: SEVERE PAIN (7)

## 2024-07-31 NOTE — NURSING NOTE
"Chief Complaint   Patient presents with    Burn     Burn on right thigh from hot coffee     Patient presents today with a burn on bottom of her right thigh. She said on Friday she spilled hot coffee on herself and it has been very tender and painful since. She applied some ice to it, this helped a little.       Initial /83 (BP Location: Left arm, Patient Position: Sitting, Cuff Size: Adult Regular)   Pulse 88   Temp 98.4  F (36.9  C) (Temporal)   Resp 18   Ht 1.626 m (5' 4\")   Wt 54.4 kg (120 lb)   LMP 01/01/2013 (Within Years)   SpO2 94%   BMI 20.60 kg/m   Estimated body mass index is 20.6 kg/m  as calculated from the following:    Height as of this encounter: 1.626 m (5' 4\").    Weight as of this encounter: 54.4 kg (120 lb).     FOOD SECURITY SCREENING QUESTIONS:    The next two questions are to help us understand your food security.  If you are feeling you need any assistance in this area, we have resources available to support you today.    Hunger Vital Signs:  Within the past 12 months we worried whether our food would run out before we got money to buy more. Never  Within the past 12 months the food we bought just didn't last and we didn't have money to get more. Never      Esteban Adams    "

## 2024-07-31 NOTE — PROGRESS NOTES
ASSESSMENT/PLAN:    I have reviewed the nursing notes.  I have reviewed the findings, diagnosis, plan and need for follow up with the patient.    1. Pain of right thigh  2. Burn of right thigh, second degree, initial encounter    - silver sulfADIAZINE (SILVADENE) 1 % external cream; Apply topically daily Apply to burn daily and cover with non stick dressing until healed.  Apply only to burn area, not healthy skin.  Dispense: 85 g; Refill: 0    - Please read the attached information on burns for at home care treatment as discussed in the clinic this morning.    - May use over-the-counter Tylenol or ibuprofen as needed for pain, inflammation or fever    - Discussed warning signs/symptoms indicative of need to f/u    - Follow up if symptoms persist or worsen or concerns    - I explained my diagnostic considerations and recommendations to the patient, who voiced understanding and agreement with the treatment plan. All questions were answered. We discussed potential side effects of any prescribed or recommended therapies, as well as expectations for response to treatments.    GIOVANNA Love CNP  7/31/2024  9:32 AM    HPI:    Sara Liriano is a 49 year old female who presents to [facility] today for concerns of a burn from a cup of coffee back of right thigh.  Patient states that incident occurred on Friday.  Still complaining of pain and concerned about possible infection.  States that she has been putting ice on and OTC Neosporin.  Denies any other injuries.  Denies fever, shortness of breath, chest discomfort, cold symptoms, nausea, vomiting, diarrhea.    Past Medical History:   Diagnosis Date    Anorexia     Atypical depression     Hypothyroidism     Intracranial hemorrhage (H) 2018    Osteopenia     PTSD (post-traumatic stress disorder)     Seizure disorder (H)     TBI (traumatic brain injury) (H) 05/2018     Past Surgical History:   Procedure Laterality Date    CRANIOPLASTY  08/08/2018    CRANIOTOMY   05/2018    TRACHEOSTOMY  06/2018     Social History     Tobacco Use    Smoking status: Never     Passive exposure: Never    Smokeless tobacco: Never   Substance Use Topics    Alcohol use: Yes     Comment: Alcoholic Drinks/day: Occassional     Current Outpatient Medications   Medication Sig Dispense Refill    acetaminophen (TYLENOL) 325 MG tablet Take 2 tablets (650 mg) by mouth daily Do not exceed 4000 mg from all sources. 2 tablet 0    bisacodyl (DULCOLAX) 10 MG suppository Place 10 mg rectally daily as needed for constipation      cetirizine (ZYRTEC) 10 MG tablet Take 10 mg by mouth daily as needed for allergies      cyclobenzaprine (FLEXERIL) 5 MG tablet Take 1 tablet (5 mg) by mouth 3 times daily as needed for muscle spasms 90 tablet 11    HYDROmorphone (DILAUDID) 2 MG tablet Take 2 mg by mouth every 2 hours as needed for severe pain or shortness of breath Medication provided by hospice      Lacosamide (VIMPAT) 100 MG TABS tablet Take 1 tablet (100 mg) by mouth 2 times daily 180 tablet 3    lamoTRIgine (LAMICTAL) 200 MG tablet Take 1 tablet (200 mg) by mouth 2 times daily 180 tablet 3    levothyroxine (SYNTHROID/LEVOTHROID) 50 MCG tablet Take 1 tablet (50 mcg) at 7 am daily, 1 hour prior to eating and taking other medications 90 tablet 3    loperamide (IMODIUM) 2 MG capsule Take 2 mg by mouth Take 4 mg with first loose stool, followed by 2 mg after each loose stool. Maximum 16 mg/day (8/mg OTC)      LORazepam (ATIVAN) 0.5 MG tablet Take 0.5 mg by mouth every 4 hours as needed for anxiety Medication provided by hospice      magnesium hydroxide (MILK OF MAGNESIA) 400 MG/5ML suspension Take 30 mLs by mouth daily as needed for constipation      melatonin 3 MG tablet Take 1 tablet (3 mg) by mouth nightly as needed for sleep 90 tablet 3    omeprazole (PRILOSEC OTC) 20 MG EC tablet Take 1 tablet (20 mg) by mouth daily 90 tablet 3    omeprazole (PRILOSEC) 20 MG DR capsule Take 20 mg by mouth daily      ondansetron  "(ZOFRAN ODT) 8 MG ODT tab Take 8 mg by mouth every 6 hours as needed for nausea      polyethylene glycol (MIRALAX) 17 GM/Dose powder Take 17 g (1 Capful) by mouth daily (Patient taking differently: Take 1 Capful by mouth daily as needed) 850 g 4    prochlorperazine (COMPAZINE) 10 MG tablet Take 10 mg by mouth every 6 hours as needed for nausea or vomiting Medication provided by hospice      senna-docusate (SENOKOT-S/PERICOLACE) 8.6-50 MG tablet Take 1 tablet by mouth daily as needed for constipation      silver sulfADIAZINE (SILVADENE) 1 % external cream Apply topically daily Apply to burn daily and cover with non stick dressing until healed.  Apply only to burn area, not healthy skin. 85 g 0    traMADol (ULTRAM) 50 MG tablet Take 50 mg by mouth every 6 hours as needed for severe pain      hyoscyamine 0.125 MG TBDP Take 0.125 mg by mouth every 4 hours as needed (secretions) Medication provided by hospice (Patient not taking: Reported on 7/16/2024)      mirtazapine (REMERON) 15 MG tablet Take 15 mg by mouth nightly as needed (sleep aid) (Patient not taking: Reported on 7/16/2024)       Allergies   Allergen Reactions    Fluoxetine Other (See Comments) and Swelling     Blurred vision, heart racing, face swelling    Amoxicillin Other (See Comments)     \"Thrush\"     Past medical history, past surgical history, current medications and allergies reviewed and accurate to the best of my knowledge.      ROS:  Refer to HPI    /83 (BP Location: Left arm, Patient Position: Sitting, Cuff Size: Adult Regular)   Pulse 88   Temp 98.4  F (36.9  C) (Temporal)   Resp 18   Ht 1.626 m (5' 4\")   Wt 54.4 kg (120 lb)   LMP 01/01/2013 (Within Years)   SpO2 94%   BMI 20.60 kg/m      EXAM:  General Appearance: Well appearing 49 year old female, appropriate appearance for age. No acute distress   Respiratory: normal chest wall and respirations.  Normal effort.  Clear to auscultation bilaterally, no wheezing, crackles or rhonchi.  " No increased work of breathing.  No cough appreciated.  Cardiac: RRR with no murmurs  Musculoskeletal:  Equal movement of bilateral upper extremities.  Equal movement of bilateral lower extremities.  In wheelchair in clinic.    Dermatological: See attached picture for details  Neuro: Alert and oriented to person, place, and time.    Psychological: normal affect, alert, oriented, and pleasant.

## 2024-08-05 ENCOUNTER — TELEPHONE (OUTPATIENT)
Dept: FAMILY MEDICINE | Facility: OTHER | Age: 49
End: 2024-08-05
Payer: OTHER GOVERNMENT

## 2024-08-05 NOTE — TELEPHONE ENCOUNTER
My best assumption would be that they are checking on her for positioning to assure there is no ulcerations or sores, and/or checking in health status to assure she is OK - especially with seizure history.     Unfortunately, if this is their protocol for all patients, I do not have much say in changing in this.     Tammy Webber PA-C  8/5/2024  4:22 PM

## 2024-08-05 NOTE — TELEPHONE ENCOUNTER
The patient wants to talk to Tammy Webber about being what is happening to her at [facility].  She is being woke up and being checked on at night and feels she does not need to be woke up and feels she is losing sleep when they do this.  She would like to talk to Tammy about this.

## 2024-08-06 NOTE — TELEPHONE ENCOUNTER
I am not comfortable writing this if this is their protocol to have checks every so often (hours, etc.).     Tammy Webber PA-C  8/6/2024  9:23 AM

## 2024-08-06 NOTE — TELEPHONE ENCOUNTER
Patient notified of providers note. Patient reports that it is 1 specific staff member that has full conversations with her when she needs to sleep.  Patient reports that if she doesn't get enough sleep from 10 PM to 5 AM, this will increase her risk of seizures and falls.    Patient reports that she spoke with RN manager about staffing concerns.  Patient wants a form to come from [facility] indicating that she can be left alone at night.  Patient is aware that [facility] doesn't have any forms but is requesting some sort of documentation from [facility] to give to assisted living staff for patient and legal guardian to sign so nobody gets sued.      Barb Gabriel LPN 8/6/2024 8:38 AM

## 2024-08-06 NOTE — TELEPHONE ENCOUNTER
Olivia Harper RN confirms that staff checks on patient every 2-3 hours.  Staff was advised about patient's night time experience and concerns with 1 particular staff member.  RN confirms that staff does not have staff carry on conversations with patient, staff quietly opens the door-looks at patient-quietly close the door-document what patient was doing during the check.    RN will talk with patient again.  Barb Gabriel LPN 8/6/2024 1:49 PM

## 2024-08-17 DIAGNOSIS — S32.010G COMPRESSION FRACTURE OF L1 VERTEBRA WITH DELAYED HEALING, SUBSEQUENT ENCOUNTER: ICD-10-CM

## 2024-08-20 NOTE — TELEPHONE ENCOUNTER
[id number] of [place] sent Rx request for the following:      Requested Prescriptions   Pending Prescriptions Disp Refills    cyclobenzaprine (FLEXERIL) 5 MG tablet [Pharmacy Med Name: CYCLOBENZAPRINE 5MG TABLET] 90 tablet 0     Sig: TAKE 1 TAB BY MOUTH TWICE ADAY; TAKE 1 TAB BY MOUTH ONCE DAILY AS NEEDED       There is no refill protocol information for this order        Last Prescription Date:     cyclobenzaprine (FLEXERIL) 5 MG tablet 90 tablet 11 7/16/2024 -- No   Sig - Route: Take 1 tablet (5 mg) by mouth 3 times daily as needed for muscle spasms - Oral   Sent to pharmacy as: Cyclobenzaprine HCl 5 MG Oral Tablet (FLEXERIL)   Class: E-Prescribe   Order: [id number]   E-Prescribing Status: Receipt confirmed by pharmacy (7/16/2024  4:08 PM CDT)     [id number] - [place], [place] - [address]     Never ordered as requested. Need to call Pt to confirm. Kayley Thomas RN .............. 8/20/2024  4:30 PM

## 2024-08-21 RX ORDER — CYCLOBENZAPRINE HCL 5 MG
5 TABLET ORAL 2 TIMES DAILY PRN
Qty: 60 TABLET | Refills: 10 | Status: SHIPPED | OUTPATIENT
Start: 2024-08-21

## 2024-08-21 NOTE — TELEPHONE ENCOUNTER
Called and spoke to Patient after verifying last name and date of birth. Pt states she needs a new prescription for 1 tablet, 2 times daily as needed. Kayley Thomas RN .............. 8/21/2024  9:51 AM

## 2024-08-22 ENCOUNTER — THERAPY VISIT (OUTPATIENT)
Dept: PHYSICAL THERAPY | Facility: OTHER | Age: 49
End: 2024-08-22
Attending: PHYSICIAN ASSISTANT
Payer: OTHER GOVERNMENT

## 2024-08-22 DIAGNOSIS — S72.002A CLOSED FRACTURE OF NECK OF LEFT FEMUR, INITIAL ENCOUNTER (H): ICD-10-CM

## 2024-08-22 DIAGNOSIS — M80.08XD AGE-RELATED OSTEOPOROSIS WITH CURRENT PATHOLOGICAL FRACTURE OF VERTEBRA WITH ROUTINE HEALING: ICD-10-CM

## 2024-08-22 DIAGNOSIS — R53.81 PHYSICAL DECONDITIONING: ICD-10-CM

## 2024-08-22 DIAGNOSIS — S32.010G COMPRESSION FRACTURE OF L1 VERTEBRA WITH DELAYED HEALING, SUBSEQUENT ENCOUNTER: ICD-10-CM

## 2024-08-22 PROCEDURE — 97162 PT EVAL MOD COMPLEX 30 MIN: CPT | Mod: GP | Performed by: PHYSICAL THERAPIST

## 2024-08-22 PROCEDURE — 97116 GAIT TRAINING THERAPY: CPT | Mod: GP | Performed by: PHYSICAL THERAPIST

## 2024-08-22 NOTE — PROGRESS NOTES
PHYSICAL THERAPY EVALUATION  Type of Visit: Evaluation              Subjective       Presenting condition or subjective complaint: Deconditioning loss of movement trouble with walking trouble with balance and pain  Date of onset:  (Patient reports falling in January)    Relevant medical history:     Dates & types of surgery:      Patient is in physical therapy for left hip pain and right shoulder pain as well as overall deconditioning trouble with balance and trouble with walking.  She had a fall in January with fracture left hip repaired and patient also reports she got so deconditioned that she was on hospice.  She has been working her way back to get stronger.  She is to soon be moving into her own mobile home.  She has 4 out of 10 pain in the left hip.  Minimal back pain from her compression fracture.  She has difficulty with walking reaching overhead and daily self-cares.  She does very little walking with front wheel walker at home mostly scooting around in her wheelchair.  She does go to the [facility] regularly working on seated stepper and performing classes that she can do for bone health.  She is eager to walk with assistive device and be confident on stairs to get into her home.  She is retired from the [employer] and enjoys getting out with friends and her mom.    Prior diagnostic imaging/testing results:       Prior therapy history for the same diagnosis, illness or injury: Yes      Prior Level of Function  Transfers: Assistive equipment  Ambulation: Assistive equipment  ADL:   IADL:     Living Environment  Social support: With family members   Type of home:     Stairs to enter the home:   3 Is there a railing: Yes     Ramp:     Stairs inside the home:         Help at home:    Equipment owned: Standard wheelchair; Walker     Employment: No    Hobbies/Interests:      Patient goals for therapy: Improve strength and mobility, decrease falls risk, walk with walker, decrease reliance on wheelchair, perform stairs  safely so she can get into her own home.    Pain assessment:      Objective      Cognitive Status Examination  Orientation: Oriented to person, place and time   Level of Consciousness:   Follows Commands and Answers Questions:   Personal Safety and Judgement:   Memory:     OBSERVATION: Patient scoots really well in wheelchair.  She has good range of motion and flexibility of hamstrings.  She does have a kyphotic thoracic and lumbar spine, she sit to stand well but needs hands for assist.  Walking with front wheel walker with contact-guard assist able to perform about 50 feet.  Walking on stairs with contact-guard assist with step to pattern and bilateral rails  INTEGUMENTARY:   POSTURE: Kyphotic posture    STRENGTH: 3+ to 4 out of 5 leg and core strength    BED MOBILITY: Challenging due to hip weakness and back pain    TRANSFERS: SBA    WHEELCHAIR MOBILITY: Independent with wheelchair mobility    GAIT:   Level of Hood: Contact Guard  Assistive Device(s): Walker (front wheeled), Walker (four wheeled)  Gait Deviations:   Gait Distance: 50  Stairs: Bilateral railing and step to pattern for steps    BALANCE: Not assessed on eval        SENSATION:     REFLEXES:   COORDINATION: Has good coordination  MUSCLE TONE: Tone is normal        Assessment & Plan   CLINICAL IMPRESSIONS  Medical Diagnosis: Physical deconditioning (R53.81), Age-related osteoporosis with current pathological fracture of vertebra with routine healing (M80.08XD), Compression fracture of L1 vertebra with delayed healing, subsequent encounter (S32.010G), Closed fracture of neck of left femur, initial encounter (H) (S72.002A)    Treatment Diagnosis: Hip and core weakness, falls risk, degenerative changes of lumbar spine, decreased gait and ambulation tolerance and safety   Impression/Assessment: Patient is a 49 year old female with deconditioning and hip pain complaints.  The following significant findings have been identified: Pain, Decreased  ROM/flexibility, Decreased joint mobility, Decreased strength, Impaired balance, Impaired gait, Impaired muscle performance, and Decreased activity tolerance. These impairments interfere with their ability to perform self care tasks, work tasks, recreational activities, household chores, driving , household mobility, and community mobility as compared to previous level of function.     Clinical Decision Making (Complexity):  Clinical Presentation: Evolving/Changing  Clinical Presentation Rationale: based on medical and personal factors listed in PT evaluation  Clinical Decision Making (Complexity): Moderate complexity    PLAN OF CARE  Treatment Interventions:  Modalities: Cryotherapy, Hot Pack  Interventions: Gait Training, Manual Therapy, Neuromuscular Re-education, Therapeutic Activity, Therapeutic Exercise, Aquatic Therapy    Long Term Goals     PT Goal 1  Goal Identifier: Home exercise program  Goal Description: Patient will maintain home exercise program twice a week to improve mobility and improve balance to reduce falls risk and improve overall mobility and ambulation  Target Date: 11/14/24  PT Goal 2  Goal Identifier: Stairs  Goal Description: Patient will walk up stairs with bilateral and/or unilateral handrail to get into her apartment with no need for assist from PT or caregiver  Target Date: 09/19/24  PT Goal 3  Goal Identifier: Walking  Goal Description: Patient will walk with assistive device either walker or cane up to 500 feet for community ambulation  Target Date: 10/17/24      Frequency of Treatment: 1-2 times a week  Duration of Treatment: 12 weeks    Recommended Referrals to Other Professionals:   Education Assessment:        Risks and benefits of evaluation/treatment have been explained.   Patient/Family/caregiver agrees with Plan of Care.     Evaluation Time:     PT Eval, Moderate Complexity Minutes (23569): 30       Signing Clinician: Roni Mchugh PT

## 2024-08-27 ENCOUNTER — THERAPY VISIT (OUTPATIENT)
Dept: PHYSICAL THERAPY | Facility: OTHER | Age: 49
End: 2024-08-27
Attending: PHYSICIAN ASSISTANT
Payer: OTHER GOVERNMENT

## 2024-08-27 DIAGNOSIS — R53.81 PHYSICAL DECONDITIONING: Primary | ICD-10-CM

## 2024-08-27 DIAGNOSIS — M25.552 HIP PAIN, LEFT: ICD-10-CM

## 2024-08-27 DIAGNOSIS — M80.08XD AGE-RELATED OSTEOPOROSIS WITH CURRENT PATHOLOGICAL FRACTURE OF VERTEBRA WITH ROUTINE HEALING: ICD-10-CM

## 2024-08-27 PROCEDURE — 97110 THERAPEUTIC EXERCISES: CPT | Mod: GP

## 2024-08-27 PROCEDURE — 97116 GAIT TRAINING THERAPY: CPT | Mod: GP

## 2024-08-27 PROCEDURE — 97530 THERAPEUTIC ACTIVITIES: CPT | Mod: GP

## 2024-08-29 ENCOUNTER — THERAPY VISIT (OUTPATIENT)
Dept: PHYSICAL THERAPY | Facility: OTHER | Age: 49
End: 2024-08-29
Attending: PHYSICIAN ASSISTANT
Payer: OTHER GOVERNMENT

## 2024-08-29 DIAGNOSIS — S32.010G COMPRESSION FRACTURE OF L1 VERTEBRA WITH DELAYED HEALING, SUBSEQUENT ENCOUNTER: ICD-10-CM

## 2024-08-29 DIAGNOSIS — M80.08XD AGE-RELATED OSTEOPOROSIS WITH CURRENT PATHOLOGICAL FRACTURE OF VERTEBRA WITH ROUTINE HEALING: ICD-10-CM

## 2024-08-29 DIAGNOSIS — R53.81 PHYSICAL DECONDITIONING: Primary | ICD-10-CM

## 2024-08-29 DIAGNOSIS — Z87.81 S/P LEFT HIP FRACTURE: ICD-10-CM

## 2024-08-29 DIAGNOSIS — S72.002A CLOSED FRACTURE OF NECK OF LEFT FEMUR, INITIAL ENCOUNTER (H): ICD-10-CM

## 2024-08-29 DIAGNOSIS — M25.552 HIP PAIN, LEFT: ICD-10-CM

## 2024-08-29 DIAGNOSIS — M62.81 MUSCLE WEAKNESS OF LOWER EXTREMITY: ICD-10-CM

## 2024-08-29 PROCEDURE — 97116 GAIT TRAINING THERAPY: CPT | Mod: GP | Performed by: PHYSICAL THERAPIST

## 2024-08-29 PROCEDURE — 97110 THERAPEUTIC EXERCISES: CPT | Mod: GP | Performed by: PHYSICAL THERAPIST

## 2024-08-30 ENCOUNTER — HOSPITAL ENCOUNTER (EMERGENCY)
Facility: OTHER | Age: 49
Discharge: HOME | End: 2024-08-30
Payer: OTHER GOVERNMENT

## 2024-08-30 ENCOUNTER — APPOINTMENT (OUTPATIENT)
Dept: CT IMAGING | Facility: OTHER | Age: 49
End: 2024-08-30
Payer: OTHER GOVERNMENT

## 2024-08-30 VITALS
TEMPERATURE: 98.2 F | WEIGHT: 120 LBS | HEART RATE: 87 BPM | OXYGEN SATURATION: 98 % | BODY MASS INDEX: 20.49 KG/M2 | RESPIRATION RATE: 18 BRPM | DIASTOLIC BLOOD PRESSURE: 88 MMHG | HEIGHT: 64 IN | SYSTOLIC BLOOD PRESSURE: 118 MMHG

## 2024-08-30 DIAGNOSIS — G40.909 SEIZURE DISORDER (H): ICD-10-CM

## 2024-08-30 LAB
ALBUMIN UR-MCNC: NEGATIVE MG/DL
ANION GAP SERPL CALCULATED.3IONS-SCNC: 14 MMOL/L (ref 7–15)
APPEARANCE UR: CLEAR
BASOPHILS # BLD AUTO: 0.1 10E3/UL (ref 0–0.2)
BASOPHILS NFR BLD AUTO: 1 %
BILIRUB UR QL STRIP: NEGATIVE
BUN SERPL-MCNC: 4.7 MG/DL (ref 6–20)
CALCIUM SERPL-MCNC: 8.9 MG/DL (ref 8.8–10.4)
CHLORIDE SERPL-SCNC: 103 MMOL/L (ref 98–107)
COLOR UR AUTO: ABNORMAL
CREAT SERPL-MCNC: 0.52 MG/DL (ref 0.51–0.95)
EGFRCR SERPLBLD CKD-EPI 2021: >90 ML/MIN/1.73M2
EOSINOPHIL # BLD AUTO: 0.1 10E3/UL (ref 0–0.7)
EOSINOPHIL NFR BLD AUTO: 1 %
ERYTHROCYTE [DISTWIDTH] IN BLOOD BY AUTOMATED COUNT: 15.9 % (ref 10–15)
GLUCOSE BLDC GLUCOMTR-MCNC: 134 MG/DL (ref 70–99)
GLUCOSE SERPL-MCNC: 130 MG/DL (ref 70–99)
GLUCOSE UR STRIP-MCNC: NEGATIVE MG/DL
HCO3 SERPL-SCNC: 21 MMOL/L (ref 22–29)
HCT VFR BLD AUTO: 39.1 % (ref 35–47)
HGB BLD-MCNC: 13.3 G/DL (ref 11.7–15.7)
HGB UR QL STRIP: NEGATIVE
HOLD SPECIMEN: NORMAL
HYALINE CASTS: 1 /LPF
IMM GRANULOCYTES # BLD: 0.1 10E3/UL
IMM GRANULOCYTES NFR BLD: 1 %
KETONES UR STRIP-MCNC: NEGATIVE MG/DL
LEUKOCYTE ESTERASE UR QL STRIP: NEGATIVE
LYMPHOCYTES # BLD AUTO: 2.6 10E3/UL (ref 0.8–5.3)
LYMPHOCYTES NFR BLD AUTO: 34 %
MAGNESIUM SERPL-MCNC: 2.2 MG/DL (ref 1.7–2.3)
MCH RBC QN AUTO: 30.2 PG (ref 26.5–33)
MCHC RBC AUTO-ENTMCNC: 34 G/DL (ref 31.5–36.5)
MCV RBC AUTO: 89 FL (ref 78–100)
MONOCYTES # BLD AUTO: 0.6 10E3/UL (ref 0–1.3)
MONOCYTES NFR BLD AUTO: 8 %
MUCOUS THREADS #/AREA URNS LPF: PRESENT /LPF
NEUTROPHILS # BLD AUTO: 4.2 10E3/UL (ref 1.6–8.3)
NEUTROPHILS NFR BLD AUTO: 55 %
NITRATE UR QL: NEGATIVE
NRBC # BLD AUTO: 0 10E3/UL
NRBC BLD AUTO-RTO: 0 /100
PH UR STRIP: 7.5 [PH] (ref 5–9)
PLATELET # BLD AUTO: 344 10E3/UL (ref 150–450)
POTASSIUM SERPL-SCNC: 4.3 MMOL/L (ref 3.4–5.3)
RBC # BLD AUTO: 4.41 10E6/UL (ref 3.8–5.2)
RBC URINE: <1 /HPF
SODIUM SERPL-SCNC: 138 MMOL/L (ref 135–145)
SP GR UR STRIP: 1.01 (ref 1–1.03)
UROBILINOGEN UR STRIP-MCNC: NORMAL MG/DL
WBC # BLD AUTO: 7.6 10E3/UL (ref 4–11)
WBC URINE: <1 /HPF

## 2024-08-30 PROCEDURE — 96361 HYDRATE IV INFUSION ADD-ON: CPT

## 2024-08-30 PROCEDURE — 85025 COMPLETE CBC W/AUTO DIFF WBC: CPT

## 2024-08-30 PROCEDURE — 99285 EMERGENCY DEPT VISIT HI MDM: CPT | Mod: 25

## 2024-08-30 PROCEDURE — 80048 BASIC METABOLIC PNL TOTAL CA: CPT

## 2024-08-30 PROCEDURE — 81001 URINALYSIS AUTO W/SCOPE: CPT

## 2024-08-30 PROCEDURE — 80175 DRUG SCREEN QUAN LAMOTRIGINE: CPT

## 2024-08-30 PROCEDURE — 96374 THER/PROPH/DIAG INJ IV PUSH: CPT

## 2024-08-30 PROCEDURE — 83735 ASSAY OF MAGNESIUM: CPT

## 2024-08-30 PROCEDURE — 80235 DRUG ASSAY LACOSAMIDE: CPT

## 2024-08-30 PROCEDURE — 250N000013 HC RX MED GY IP 250 OP 250 PS 637

## 2024-08-30 PROCEDURE — 93010 ELECTROCARDIOGRAM REPORT: CPT | Performed by: INTERNAL MEDICINE

## 2024-08-30 PROCEDURE — 36415 COLL VENOUS BLD VENIPUNCTURE: CPT

## 2024-08-30 PROCEDURE — 93005 ELECTROCARDIOGRAM TRACING: CPT

## 2024-08-30 PROCEDURE — 99284 EMERGENCY DEPT VISIT MOD MDM: CPT

## 2024-08-30 PROCEDURE — 258N000003 HC RX IP 258 OP 636

## 2024-08-30 PROCEDURE — 70450 CT HEAD/BRAIN W/O DYE: CPT

## 2024-08-30 PROCEDURE — 82962 GLUCOSE BLOOD TEST: CPT

## 2024-08-30 PROCEDURE — 250N000011 HC RX IP 250 OP 636

## 2024-08-30 RX ORDER — LAMOTRIGINE 100 MG/1
200 TABLET ORAL 2 TIMES DAILY
Status: DISCONTINUED | OUTPATIENT
Start: 2024-08-30 | End: 2024-08-30 | Stop reason: HOSPADM

## 2024-08-30 RX ORDER — ONDANSETRON 2 MG/ML
4 INJECTION INTRAMUSCULAR; INTRAVENOUS ONCE
Status: COMPLETED | OUTPATIENT
Start: 2024-08-30 | End: 2024-08-30

## 2024-08-30 RX ORDER — ACETAMINOPHEN 325 MG/1
650 TABLET ORAL ONCE
Status: COMPLETED | OUTPATIENT
Start: 2024-08-30 | End: 2024-08-30

## 2024-08-30 RX ORDER — LACOSAMIDE 150 MG/1
150 TABLET ORAL ONCE
Status: COMPLETED | OUTPATIENT
Start: 2024-08-30 | End: 2024-08-30

## 2024-08-30 RX ADMIN — SODIUM CHLORIDE 1000 ML: 9 INJECTION, SOLUTION INTRAVENOUS at 18:09

## 2024-08-30 RX ADMIN — ACETAMINOPHEN 650 MG: 325 TABLET ORAL at 19:37

## 2024-08-30 RX ADMIN — LAMOTRIGINE 200 MG: 100 TABLET ORAL at 19:37

## 2024-08-30 RX ADMIN — ONDANSETRON 4 MG: 2 INJECTION INTRAMUSCULAR; INTRAVENOUS at 18:08

## 2024-08-30 RX ADMIN — LACOSAMIDE 150 MG: 150 TABLET, FILM COATED ORAL at 19:37

## 2024-08-30 ASSESSMENT — ACTIVITIES OF DAILY LIVING (ADL)
ADLS_ACUITY_SCORE: 38

## 2024-08-30 ASSESSMENT — ENCOUNTER SYMPTOMS
NAUSEA: 1
SEIZURES: 1
HEADACHES: 1

## 2024-08-30 NOTE — ED TRIAGE NOTES
"ED Nursing Triage Note (General)   ________________________________    Sara Liriano is a 49 year old Female that presents to triage via [facility] with complaints of an approx 2min seizure.  Patient was found on the ground next to her wheel chair by pedestrians and witnessed a 2 min seizure.  Patient states she has auras prior to seizures and was able to get onto the ground today prior to seizing.  Patient denies falling out of her chair.  Per EMS, however, lump is noted to the back of the head.  Patient has a hx of epilepsy and is followed by neurology in [place] with an apt scheduled for next month due to frequent break-through seizures.  Patient states she has been having seizures monthly regardless of taking her medications as prescribed. On arrival to ED patient is A&O. Patient denies recent medication changes or dietary changes.  No recent fevers or illness, patient has been eating and drinking. Patient states possible trigger could be that she is moving and states more seizures for the past month.   Significant symptoms had onset 1 hour(s) ago.  BP (!) 135/99   Pulse 116   Temp 98.2  F (36.8  C) (Tympanic)   Resp 20   Ht 1.626 m (5' 4\")   Wt 54.4 kg (120 lb)   LMP 01/01/2013 (Within Years)   SpO2 99%   BMI 20.60 kg/m    Vital signs:  Temp: 98.2  F (36.8  C) Temp src: Tympanic BP: (!) 135/99 Pulse: 116   Resp: 20 SpO2: 99 %     Height: 162.6 cm (5' 4\") Weight: 54.4 kg (120 lb)  Estimated body mass index is 20.6 kg/m  as calculated from the following:    Height as of this encounter: 1.626 m (5' 4\").    Weight as of this encounter: 54.4 kg (120 lb).  PRE HOSPITAL PRIOR LIVING SITUATION Alone      Triage Assessment (Adult)       Row Name 08/30/24 [id number]          Triage Assessment    Airway WDL WDL        Respiratory WDL    Respiratory WDL WDL        Skin Circulation/Temperature WDL    Skin Circulation/Temperature WDL WDL        Cardiac WDL    Cardiac WDL WDL     Cardiac Rhythm NSR        " Peripheral/Neurovascular WDL    Peripheral Neurovascular WDL WDL     Capillary Refill, General less than/equal to 3 secs        Cognitive/Neuro/Behavioral WDL    Cognitive/Neuro/Behavioral WDL WDL        Sary Coma Scale    Best Eye Response 4-->(E4) spontaneous     Best Motor Response 6-->(M6) obeys commands

## 2024-08-30 NOTE — ED PROVIDER NOTES
"  History     Chief Complaint   Patient presents with    Seizures     The history is provided by the patient and medical records.     Sara Liriano is a 49 year old female who presents to the emergency department today via ambulance, from the [facility].  Mother is with patient but she did not witness the seizure.  Patient reports that she started to have a funny smell, which is an aura for her seizures, she lowered herself down to the ground in which she had a seizure that was witnessed by bystanders who called EMS for evaluation.  Is reported that the seizure may have lasted about 2 minutes. patient reports that she has had 2 seizures today.  She reports that she has a history of seizures and she has been taking her medication as prescribed.  Today she did bite her lower lip during her seizure, she is not sure if she hit her head when she was rolling around, but she does have a headache which is common after her seizures.  She is very nauseated.  No loss of bowels or bladder.  She is following up with her neurologist at [facility] on September 20th.  She also had a referral for neurology within the [place] system placed by her primary care provider in July.    VIMPAT 100mg BID  Lamotrigine 200mg BID    PMH: Seizure disorder, osteoarthritis right hip, pelvis fracture, postop repair of the left hip fracture, anorexia    MRI brain & orbits: 7/19/2024:  IMPRESSION:   The MR appearance the brain is compatible with the given clinical  history of traumatic brain injury. No evidence of an acute  intracranial process.   Normal MR appearance of the orbits.    Allergies:  Allergies   Allergen Reactions    Fluoxetine Other (See Comments) and Swelling     Blurred vision, heart racing, face swelling    Amoxicillin Other (See Comments)     \"Thrush\"       Problem List:    Patient Active Problem List    Diagnosis Date Noted    Status post-operative repair of closed fracture of left hip 01/18/2024     Priority: Medium    Closed " fracture of neck of left femur, initial encounter (H) 01/18/2024     Priority: Medium    Closed fracture of superior ramus of right pubis with routine healing, subsequent encounter 01/18/2024     Priority: Medium    Anorexia 11/29/2023     Priority: Medium    Seizure disorder (H) 11/08/2023     Priority: Medium    Bony sclerosis 11/08/2023     Priority: Medium    Primary osteoarthritis of right hip 11/08/2023     Priority: Medium    Contusion of right hip, initial encounter 11/08/2023     Priority: Medium    Fall from slip, trip, or stumble, initial encounter 11/08/2023     Priority: Medium    Shoulder injury, right, initial encounter 11/08/2023     Priority: Medium    Inner ear dysfunction, left 11/08/2023     Priority: Medium    Closed nondisplaced fracture of pelvis, unspecified part of pelvis, initial encounter (H) 11/08/2023     Priority: Medium    Arthritis of right glenohumeral joint 11/08/2023     Priority: Medium        Past Medical History:    Past Medical History:   Diagnosis Date    Anorexia     Atypical depression     Hypothyroidism     Intracranial hemorrhage (H) 2018    Osteopenia     PTSD (post-traumatic stress disorder)     Seizure disorder (H)     TBI (traumatic brain injury) (H) 05/2018       Past Surgical History:    Past Surgical History:   Procedure Laterality Date    CRANIOPLASTY  08/08/2018    CRANIOTOMY  05/2018    TRACHEOSTOMY  06/2018       Family History:    Family History   Problem Relation Age of Onset    Hypertension Mother     Heart Failure Father     Diabetes Father     Hypertension Sister        Social History:  Marital Status:  Single [1]  Social History     Tobacco Use    Smoking status: Never     Passive exposure: Never    Smokeless tobacco: Never   Vaping Use    Vaping status: Never Used   Substance Use Topics    Alcohol use: Yes     Comment: Alcoholic Drinks/day: Occassional    Drug use: No     Comment: Drug use: No        Medications:    acetaminophen (TYLENOL) 325 MG  "tablet  cyclobenzaprine (FLEXERIL) 5 MG tablet  Lacosamide (VIMPAT) 100 MG TABS tablet  lamoTRIgine (LAMICTAL) 200 MG tablet  levothyroxine (SYNTHROID/LEVOTHROID) 50 MCG tablet  bisacodyl (DULCOLAX) 10 MG suppository  cetirizine (ZYRTEC) 10 MG tablet  HYDROmorphone (DILAUDID) 2 MG tablet  hyoscyamine 0.125 MG TBDP  loperamide (IMODIUM) 2 MG capsule  LORazepam (ATIVAN) 0.5 MG tablet  magnesium hydroxide (MILK OF MAGNESIA) 400 MG/5ML suspension  melatonin 3 MG tablet  mirtazapine (REMERON) 15 MG tablet  omeprazole (PRILOSEC OTC) 20 MG EC tablet  omeprazole (PRILOSEC) 20 MG DR capsule  ondansetron (ZOFRAN ODT) 8 MG ODT tab  polyethylene glycol (MIRALAX) 17 GM/Dose powder  prochlorperazine (COMPAZINE) 10 MG tablet  senna-docusate (SENOKOT-S/PERICOLACE) 8.6-50 MG tablet  silver sulfADIAZINE (SILVADENE) 1 % external cream  traMADol (ULTRAM) 50 MG tablet      Review of Systems   Gastrointestinal:  Positive for nausea.   Neurological:  Positive for seizures and headaches.   All other systems reviewed and are negative.  See HPI    Physical Exam   BP: (!) 135/99  Pulse: 116  Temp: 98.2  F (36.8  C)  Resp: 20  Height: 162.6 cm (5' 4\")  Weight: 54.4 kg (120 lb)  SpO2: 99 %      Physical Exam  Vitals and nursing note reviewed.   Constitutional:       Appearance: She is not ill-appearing or toxic-appearing.   HENT:      Head:      Comments: Hx craniotomy      Nose: Nose normal.      Mouth/Throat:      Lips: Pink.      Mouth: Mucous membranes are moist.        Comments: Small cut to left lower lip  Eyes:      Pupils: Pupils are equal, round, and reactive to light.   Cardiovascular:      Rate and Rhythm: Regular rhythm. Tachycardia present.      Heart sounds: Normal heart sounds.   Pulmonary:      Effort: Pulmonary effort is normal.      Breath sounds: Normal breath sounds.   Abdominal:      General: Abdomen is flat.      Palpations: Abdomen is soft.   Musculoskeletal:         General: Normal range of motion.      Cervical back: " Full passive range of motion without pain and neck supple.   Skin:     General: Skin is warm.      Capillary Refill: Capillary refill takes less than 2 seconds.   Neurological:      General: No focal deficit present.      Mental Status: She is alert.      GCS: GCS eye subscore is 4. GCS verbal subscore is 5. GCS motor subscore is 6.   Psychiatric:         Mood and Affect: Mood is anxious.         ED Course            EKG Interpretation:      Interpreted by GIOVANNA Damon CNP  Time reviewed: 1721  Symptoms at time of EKG: seizure, tachycardia   Rhythm: sinus tachycardia  Rate: 119  Ectopy: none  Conduction: normal- QTc normal 458ms  ST Segments/ T Waves: no STEMI, ST changes-   Possible anterior infarct, age undetermined  Comparison to prior: R ave progression changed today from previous  Clinical Impression: as above  Pending  EKG over read by internal medicine         Results for orders placed or performed during the hospital encounter of 08/30/24 (from the past 24 hour(s))   [facility] Draw    Narrative    The following orders were created for panel order [facility].  Procedure                               Abnormality         Status                     ---------                               -----------         ------                     Extra Blue Top Tube[[id number]]                              Final result               Extra Red Top Tube[[id number]]                                                          Extra Green Top (Lithium...[[id number]]                      Final result               Extra Green Top (Lithium...[[id number]]                                                 Extra Purple Top Tube[[id number]]                            Final result                 Please view results for these tests on the individual orders.   Extra Green Top (Lithium Heparin) Tube   Result Value Ref Range    Hold Specimen x    Extra Purple Top Tube   Result Value Ref Range    Hold Specimen x    CBC with platelets  differential    Narrative    The following orders were created for panel order CBC with platelets differential.  Procedure                               Abnormality         Status                     ---------                               -----------         ------                     CBC with platelets and d...[[id number]]  Abnormal            Final result                 Please view results for these tests on the individual orders.   Basic metabolic panel   Result Value Ref Range    Sodium 138 135 - 145 mmol/L    Potassium 4.3 3.4 - 5.3 mmol/L    Chloride 103 98 - 107 mmol/L    Carbon Dioxide (CO2) 21 (L) 22 - 29 mmol/L    Anion Gap 14 7 - 15 mmol/L    Urea Nitrogen 4.7 (L) 6.0 - 20.0 mg/dL    Creatinine 0.52 0.51 - 0.95 mg/dL    GFR Estimate >90 >60 mL/min/1.73m2    Calcium 8.9 8.8 - 10.4 mg/dL    Glucose 130 (H) 70 - 99 mg/dL   CBC with platelets and differential   Result Value Ref Range    WBC Count 7.6 4.0 - 11.0 10e3/uL    RBC Count 4.41 3.80 - 5.20 10e6/uL    Hemoglobin 13.3 11.7 - 15.7 g/dL    Hematocrit 39.1 35.0 - 47.0 %    MCV 89 78 - 100 fL    MCH 30.2 26.5 - 33.0 pg    MCHC 34.0 31.5 - 36.5 g/dL    RDW 15.9 (H) 10.0 - 15.0 %    Platelet Count 344 150 - 450 10e3/uL    % Neutrophils 55 %    % Lymphocytes 34 %    % Monocytes 8 %    % Eosinophils 1 %    % Basophils 1 %    % Immature Granulocytes 1 %    NRBCs per 100 WBC 0 <1 /100    Absolute Neutrophils 4.2 1.6 - 8.3 10e3/uL    Absolute Lymphocytes 2.6 0.8 - 5.3 10e3/uL    Absolute Monocytes 0.6 0.0 - 1.3 10e3/uL    Absolute Eosinophils 0.1 0.0 - 0.7 10e3/uL    Absolute Basophils 0.1 0.0 - 0.2 10e3/uL    Absolute Immature Granulocytes 0.1 <=0.4 10e3/uL    Absolute NRBCs 0.0 10e3/uL   Magnesium   Result Value Ref Range    Magnesium 2.2 1.7 - 2.3 mg/dL   Extra Blue Top Tube   Result Value Ref Range    Hold Specimen x    Glucose by meter   Result Value Ref Range    GLUCOSE BY METER POCT 134 (H) 70 - 99 mg/dL   CT Head w/o Contrast    Narrative    Exam: CT  HEAD W/O CONTRAST    Clinical history:49 years Female seizure (hx of) may have hit her head  during seizure, hx TBI    Comparisons: 3/4/2024    Technique: Axial CT imaging of the head was performed Without  intervenous contrast.  This exam was performed using one or more of the following dose  reduction techniques:  Automated exposure control, adjustment of the PANCHO and/or KV according  to patient's size, and/or use of iterative reconstruction technique.    FINDINGS: Again seen is surgical change of left frontotemporal  craniotomy. There is encephalomalacia of the left temporal lobe. This  was present previously.   Ventricles and sulci are symmetric. The gray-white matter  differentiation throughout the brain is otherwise well maintained.  There is moderate periventricular white matter change of chronic small  vessel ischemic disease. There is no evidence of intracranial mass or  hemorrhage. Visualized portions of the paranasal sinuses and mastoid  air cells are well aerated. There is no evidence of skull fracture.      Impression    IMPRESSION: No acute intracranial findings. No evidence of  intracranial hemorrhage or skull fracture.    MAYUR ARGUETA MD         SYSTEM ID:  [id number]   UA with Microscopic reflex to Culture    Specimen: Urine, Midstream   Result Value Ref Range    Color Urine Light Yellow Colorless, Straw, Light Yellow, Yellow    Appearance Urine Clear Clear    Glucose Urine Negative Negative mg/dL    Bilirubin Urine Negative Negative    Ketones Urine Negative Negative mg/dL    Specific Gravity Urine 1.012 1.000 - 1.030    Blood Urine Negative Negative    pH Urine 7.5 5.0 - 9.0    Protein Albumin Urine Negative Negative mg/dL    Urobilinogen Urine Normal Normal, 2.0 mg/dL    Nitrite Urine Negative Negative    Leukocyte Esterase Urine Negative Negative    Mucus Urine Present (A) None Seen /LPF    RBC Urine <1 <=2 /HPF    WBC Urine <1 <=5 /HPF    Hyaline Casts Urine 1 <=2 /LPF    Narrative    Urine  Culture not indicated       Medications   lamoTRIgine (LaMICtal) tablet 200 mg (200 mg Oral $Given 8/30/24 1937)   sodium chloride 0.9% BOLUS 1,000 mL (0 mLs Intravenous Stopped 8/30/24 1940)   ondansetron (ZOFRAN) injection 4 mg (4 mg Intravenous $Given 8/30/24 1808)   lacosamide (VIMPAT) tablet 150 mg (150 mg Oral $Given 8/30/24 1937)   acetaminophen (TYLENOL) tablet 650 mg (650 mg Oral $Given 8/30/24 1937)       Assessments & Plan (with Medical Decision Making)  135/99   temp 98.2      RR 20   99% on room air- tachycardia noted  Sara Liriano is a 49 year old female who presents to the emergency department today via ambulance, from the [facility].  Mother is with patient but she did not witness the seizure.  Patient reports that she started to have a funny smell, which is an aura for her seizures, she lowered herself down to the ground in which she had a seizure that was witnessed by bystanders who called EMS for evaluation.  Patient reports that she has had 2 seizures today.  She reports that she has a history of seizures and she has been taking her medication, but they have increased in frequency over the past 2 months.  Today she did bite her lower lip during her seizure, she is not sure if she hit her head when she was rolling around, but she does have a headache which is common after her seizures.  She is very nauseated.  She is following up with her neurologist at [facility] on September 20th.   VIMPAT 100mg BID  Lamotrigine 200mg BID  No loss of bowel or bladder during seizure, did bite her lower lip, which doesn't require repair- minor- neurologically she is intact.  She is with mother who reports she has been taking her seizure medications.  Labs & Radiology results interpreted by radiologist:   ED Course as of 08/30/24 2035   Fri Aug 30, 2024   1800 Glucose by meter(!)  Bedside blood glucose 134   1800 CBC with platelets differential(!)  stable   1800 Basic metabolic panel(!)  stable   1813  Magnesium  normal   1957 CT Head w/o Contrast  IMPRESSION: No acute intracranial findings. No evidence of  intracranial hemorrhage or skull fracture.     2017 UA with Microscopic reflex to Culture(!)  Negative for infection, unremarkable       Pending lamotrigine and lacsamide level-send out   Meds: IVF, zofran - home dose of vimpat (150 mg given as this is what is available from pharmacy) and lamictal given 200 mg, tylenol for headache  Stable EKG  Vitals improved with IV fluid, symptoms nausea and vomiting improved with Zofran patient is tolerating fluids and oral medications. No seizure activity in the ER  Patient discharging home with her mother, who is also her guardian, in stable condition, she is following up with neurology later on this month, advised to return if she has new or worsening concerns.  Patient gave verbal understanding of information.     I have reviewed the nursing notes.    I have reviewed the findings, diagnosis, plan and need for follow up with the patient.    Medical Decision Making  The patient's presentation was of moderate complexity (a chronic illness mild to moderate exacerbation, progression, or side effect of treatment).    The patient's evaluation involved:  review of external note(s) from 1 sources (see separate area of note for details)  review of 2 test result(s) ordered prior to this encounter (see separate area of note for details)  ordering and/or review of 3+ test(s) in this encounter (see separate area of note for details)    The patient's management necessitated moderate risk (prescription drug management including medications given in the ED).        New Prescriptions    No medications on file       Final diagnoses:   Seizure disorder (H)       8/30/2024   [facility], APRN CNP  08/30/24 2039

## 2024-08-31 NOTE — DISCHARGE INSTRUCTIONS
These return to be seen if you have new or worsening concerns or ongoing breakthrough seizures.  Follow-up with neurology as scheduled on September 20.  Make sure you are staying hydrated and getting plenty of rest.  Follow-up with primary care provider as needed in the clinic.  You do have pending laboratory work looking at the blood levels of your seizure medications, this will be available in your chart and also you will receive a phone call if these need to be addressed.

## 2024-08-31 NOTE — PROGRESS NOTES
Pt reports feeling better.  No complaints at this time.  Billie Martínez RN.............................8/30/2024 8:09 PM

## 2024-09-01 LAB
ATRIAL RATE - MUSE: 119 BPM
DIASTOLIC BLOOD PRESSURE - MUSE: NORMAL MMHG
INTERPRETATION ECG - MUSE: NORMAL
LAMOTRIGINE SERPL-MCNC: 7.3 UG/ML
P AXIS - MUSE: 28 DEGREES
PR INTERVAL - MUSE: 154 MS
QRS DURATION - MUSE: 82 MS
QT - MUSE: 326 MS
QTC - MUSE: 458 MS
R AXIS - MUSE: 5 DEGREES
SYSTOLIC BLOOD PRESSURE - MUSE: NORMAL MMHG
T AXIS - MUSE: 20 DEGREES
VENTRICULAR RATE- MUSE: 119 BPM

## 2024-09-03 ENCOUNTER — THERAPY VISIT (OUTPATIENT)
Dept: PHYSICAL THERAPY | Facility: OTHER | Age: 49
End: 2024-09-03
Attending: PHYSICIAN ASSISTANT
Payer: OTHER GOVERNMENT

## 2024-09-03 DIAGNOSIS — S32.010G COMPRESSION FRACTURE OF L1 VERTEBRA WITH DELAYED HEALING, SUBSEQUENT ENCOUNTER: ICD-10-CM

## 2024-09-03 DIAGNOSIS — M25.552 HIP PAIN, LEFT: ICD-10-CM

## 2024-09-03 DIAGNOSIS — R53.81 PHYSICAL DECONDITIONING: Primary | ICD-10-CM

## 2024-09-03 DIAGNOSIS — M62.81 MUSCLE WEAKNESS OF LOWER EXTREMITY: ICD-10-CM

## 2024-09-03 LAB — LACOSAMIDE SERPL-MCNC: 4.7 UG/ML

## 2024-09-03 PROCEDURE — 97116 GAIT TRAINING THERAPY: CPT | Mod: GP | Performed by: PHYSICAL THERAPIST

## 2024-09-03 PROCEDURE — 97110 THERAPEUTIC EXERCISES: CPT | Mod: GP | Performed by: PHYSICAL THERAPIST

## 2024-09-05 ENCOUNTER — THERAPY VISIT (OUTPATIENT)
Dept: PHYSICAL THERAPY | Facility: OTHER | Age: 49
End: 2024-09-05
Attending: PHYSICIAN ASSISTANT
Payer: OTHER GOVERNMENT

## 2024-09-05 DIAGNOSIS — R53.81 PHYSICAL DECONDITIONING: Primary | ICD-10-CM

## 2024-09-05 DIAGNOSIS — M80.08XD AGE-RELATED OSTEOPOROSIS WITH CURRENT PATHOLOGICAL FRACTURE OF VERTEBRA WITH ROUTINE HEALING: ICD-10-CM

## 2024-09-05 DIAGNOSIS — M62.81 MUSCLE WEAKNESS OF LOWER EXTREMITY: ICD-10-CM

## 2024-09-05 PROCEDURE — 97113 AQUATIC THERAPY/EXERCISES: CPT | Mod: GP,CQ

## 2024-09-10 ENCOUNTER — THERAPY VISIT (OUTPATIENT)
Dept: PHYSICAL THERAPY | Facility: OTHER | Age: 49
End: 2024-09-10
Attending: PHYSICIAN ASSISTANT
Payer: OTHER GOVERNMENT

## 2024-09-10 DIAGNOSIS — S72.002A CLOSED FRACTURE OF NECK OF LEFT FEMUR, INITIAL ENCOUNTER (H): ICD-10-CM

## 2024-09-10 DIAGNOSIS — M80.08XD AGE-RELATED OSTEOPOROSIS WITH CURRENT PATHOLOGICAL FRACTURE OF VERTEBRA WITH ROUTINE HEALING: ICD-10-CM

## 2024-09-10 DIAGNOSIS — M25.552 HIP PAIN, LEFT: ICD-10-CM

## 2024-09-10 DIAGNOSIS — Z87.81 S/P LEFT HIP FRACTURE: ICD-10-CM

## 2024-09-10 DIAGNOSIS — R53.81 PHYSICAL DECONDITIONING: Primary | ICD-10-CM

## 2024-09-10 DIAGNOSIS — M62.81 MUSCLE WEAKNESS OF LOWER EXTREMITY: ICD-10-CM

## 2024-09-10 DIAGNOSIS — S32.010G COMPRESSION FRACTURE OF L1 VERTEBRA WITH DELAYED HEALING, SUBSEQUENT ENCOUNTER: ICD-10-CM

## 2024-09-10 PROCEDURE — 97116 GAIT TRAINING THERAPY: CPT | Mod: GP | Performed by: PHYSICAL THERAPIST

## 2024-09-10 PROCEDURE — 97110 THERAPEUTIC EXERCISES: CPT | Mod: GP | Performed by: PHYSICAL THERAPIST

## 2024-09-12 ENCOUNTER — THERAPY VISIT (OUTPATIENT)
Dept: PHYSICAL THERAPY | Facility: OTHER | Age: 49
End: 2024-09-12
Attending: PHYSICIAN ASSISTANT
Payer: OTHER GOVERNMENT

## 2024-09-12 DIAGNOSIS — S32.511D CLOSED FRACTURE OF SUPERIOR RAMUS OF RIGHT PUBIS WITH ROUTINE HEALING, SUBSEQUENT ENCOUNTER: ICD-10-CM

## 2024-09-12 DIAGNOSIS — Z98.890 STATUS POST-OPERATIVE REPAIR OF CLOSED FRACTURE OF LEFT HIP: Primary | ICD-10-CM

## 2024-09-12 DIAGNOSIS — S72.002A CLOSED FRACTURE OF NECK OF LEFT FEMUR, INITIAL ENCOUNTER (H): ICD-10-CM

## 2024-09-12 DIAGNOSIS — Z87.81 STATUS POST-OPERATIVE REPAIR OF CLOSED FRACTURE OF LEFT HIP: Primary | ICD-10-CM

## 2024-09-12 PROCEDURE — 97113 AQUATIC THERAPY/EXERCISES: CPT | Mod: GP

## 2024-09-19 ENCOUNTER — THERAPY VISIT (OUTPATIENT)
Dept: PHYSICAL THERAPY | Facility: OTHER | Age: 49
End: 2024-09-19
Attending: PHYSICIAN ASSISTANT
Payer: OTHER GOVERNMENT

## 2024-09-19 DIAGNOSIS — S72.002A CLOSED FRACTURE OF NECK OF LEFT FEMUR, INITIAL ENCOUNTER (H): ICD-10-CM

## 2024-09-19 DIAGNOSIS — Z87.81 STATUS POST-OPERATIVE REPAIR OF CLOSED FRACTURE OF LEFT HIP: Primary | ICD-10-CM

## 2024-09-19 DIAGNOSIS — Z98.890 STATUS POST-OPERATIVE REPAIR OF CLOSED FRACTURE OF LEFT HIP: Primary | ICD-10-CM

## 2024-09-19 DIAGNOSIS — S32.511D CLOSED FRACTURE OF SUPERIOR RAMUS OF RIGHT PUBIS WITH ROUTINE HEALING, SUBSEQUENT ENCOUNTER: ICD-10-CM

## 2024-09-19 PROCEDURE — 97113 AQUATIC THERAPY/EXERCISES: CPT | Mod: GP

## 2024-09-26 ENCOUNTER — THERAPY VISIT (OUTPATIENT)
Dept: PHYSICAL THERAPY | Facility: OTHER | Age: 49
End: 2024-09-26
Attending: PHYSICIAN ASSISTANT
Payer: OTHER GOVERNMENT

## 2024-09-26 DIAGNOSIS — R53.81 PHYSICAL DECONDITIONING: Primary | ICD-10-CM

## 2024-09-26 DIAGNOSIS — M25.552 HIP PAIN, LEFT: ICD-10-CM

## 2024-09-26 PROCEDURE — 97113 AQUATIC THERAPY/EXERCISES: CPT | Mod: GP,CQ

## 2024-10-01 ENCOUNTER — THERAPY VISIT (OUTPATIENT)
Dept: PHYSICAL THERAPY | Facility: OTHER | Age: 49
End: 2024-10-01
Attending: PHYSICIAN ASSISTANT
Payer: OTHER GOVERNMENT

## 2024-10-01 DIAGNOSIS — R53.81 PHYSICAL DECONDITIONING: Primary | ICD-10-CM

## 2024-10-01 DIAGNOSIS — M25.552 HIP PAIN, LEFT: ICD-10-CM

## 2024-10-01 PROCEDURE — 97112 NEUROMUSCULAR REEDUCATION: CPT | Mod: GP | Performed by: PHYSICAL THERAPIST

## 2024-10-01 PROCEDURE — 97110 THERAPEUTIC EXERCISES: CPT | Mod: GP | Performed by: PHYSICAL THERAPIST

## 2024-10-01 PROCEDURE — 97140 MANUAL THERAPY 1/> REGIONS: CPT | Mod: GP | Performed by: PHYSICAL THERAPIST

## 2024-10-03 ENCOUNTER — THERAPY VISIT (OUTPATIENT)
Dept: PHYSICAL THERAPY | Facility: OTHER | Age: 49
End: 2024-10-03
Attending: PHYSICIAN ASSISTANT
Payer: OTHER GOVERNMENT

## 2024-10-03 DIAGNOSIS — R53.81 PHYSICAL DECONDITIONING: Primary | ICD-10-CM

## 2024-10-03 DIAGNOSIS — M25.552 HIP PAIN, LEFT: ICD-10-CM

## 2024-10-03 PROCEDURE — 97113 AQUATIC THERAPY/EXERCISES: CPT | Mod: GP,CQ

## 2024-10-08 ENCOUNTER — THERAPY VISIT (OUTPATIENT)
Dept: PHYSICAL THERAPY | Facility: OTHER | Age: 49
End: 2024-10-08
Attending: PHYSICIAN ASSISTANT
Payer: OTHER GOVERNMENT

## 2024-10-08 DIAGNOSIS — R53.81 PHYSICAL DECONDITIONING: Primary | ICD-10-CM

## 2024-10-08 DIAGNOSIS — Z87.81 S/P LEFT HIP FRACTURE: ICD-10-CM

## 2024-10-08 DIAGNOSIS — M62.81 MUSCLE WEAKNESS OF LOWER EXTREMITY: ICD-10-CM

## 2024-10-08 DIAGNOSIS — M25.552 HIP PAIN, LEFT: ICD-10-CM

## 2024-10-08 PROCEDURE — 97110 THERAPEUTIC EXERCISES: CPT | Mod: GP | Performed by: PHYSICAL THERAPIST

## 2024-10-08 PROCEDURE — 97530 THERAPEUTIC ACTIVITIES: CPT | Mod: GP | Performed by: PHYSICAL THERAPIST

## 2024-10-08 PROCEDURE — 97116 GAIT TRAINING THERAPY: CPT | Mod: GP | Performed by: PHYSICAL THERAPIST

## 2024-10-10 ENCOUNTER — THERAPY VISIT (OUTPATIENT)
Dept: PHYSICAL THERAPY | Facility: OTHER | Age: 49
End: 2024-10-10
Attending: PHYSICIAN ASSISTANT
Payer: OTHER GOVERNMENT

## 2024-10-10 DIAGNOSIS — R53.81 PHYSICAL DECONDITIONING: ICD-10-CM

## 2024-10-10 DIAGNOSIS — M62.81 MUSCLE WEAKNESS OF LOWER EXTREMITY: Primary | ICD-10-CM

## 2024-10-10 PROCEDURE — 97113 AQUATIC THERAPY/EXERCISES: CPT | Mod: GP,CQ

## 2024-10-15 ENCOUNTER — HOSPITAL ENCOUNTER (EMERGENCY)
Facility: OTHER | Age: 49
Discharge: HOME | End: 2024-10-15
Attending: PHYSICIAN ASSISTANT | Admitting: PHYSICIAN ASSISTANT
Payer: OTHER GOVERNMENT

## 2024-10-15 ENCOUNTER — THERAPY VISIT (OUTPATIENT)
Dept: PHYSICAL THERAPY | Facility: OTHER | Age: 49
End: 2024-10-15
Attending: PHYSICIAN ASSISTANT
Payer: OTHER GOVERNMENT

## 2024-10-15 VITALS
DIASTOLIC BLOOD PRESSURE: 88 MMHG | SYSTOLIC BLOOD PRESSURE: 125 MMHG | OXYGEN SATURATION: 99 % | HEIGHT: 64 IN | TEMPERATURE: 97.2 F | BODY MASS INDEX: 20.49 KG/M2 | HEART RATE: 88 BPM | WEIGHT: 120 LBS | RESPIRATION RATE: 14 BRPM

## 2024-10-15 DIAGNOSIS — M62.81 MUSCLE WEAKNESS OF LOWER EXTREMITY: Primary | ICD-10-CM

## 2024-10-15 DIAGNOSIS — S09.90XA INJURY OF HEAD, INITIAL ENCOUNTER: ICD-10-CM

## 2024-10-15 DIAGNOSIS — S01.01XA SCALP LACERATION, INITIAL ENCOUNTER: ICD-10-CM

## 2024-10-15 DIAGNOSIS — Z86.69 HISTORY OF EPILEPSY: ICD-10-CM

## 2024-10-15 DIAGNOSIS — R53.81 PHYSICAL DECONDITIONING: ICD-10-CM

## 2024-10-15 DIAGNOSIS — M25.552 HIP PAIN, LEFT: ICD-10-CM

## 2024-10-15 PROCEDURE — 99282 EMERGENCY DEPT VISIT SF MDM: CPT | Performed by: PHYSICIAN ASSISTANT

## 2024-10-15 PROCEDURE — 97140 MANUAL THERAPY 1/> REGIONS: CPT | Mod: GP | Performed by: PHYSICAL THERAPIST

## 2024-10-15 PROCEDURE — 12001 RPR S/N/AX/GEN/TRNK 2.5CM/<: CPT | Performed by: PHYSICIAN ASSISTANT

## 2024-10-15 PROCEDURE — 97110 THERAPEUTIC EXERCISES: CPT | Mod: GP | Performed by: PHYSICAL THERAPIST

## 2024-10-15 PROCEDURE — 97112 NEUROMUSCULAR REEDUCATION: CPT | Mod: GP | Performed by: PHYSICAL THERAPIST

## 2024-10-15 PROCEDURE — 99207 PR NO CHARGE LOS: CPT | Performed by: PHYSICIAN ASSISTANT

## 2024-10-15 ASSESSMENT — COLUMBIA-SUICIDE SEVERITY RATING SCALE - C-SSRS
2. HAVE YOU ACTUALLY HAD ANY THOUGHTS OF KILLING YOURSELF IN THE PAST MONTH?: NO
6. HAVE YOU EVER DONE ANYTHING, STARTED TO DO ANYTHING, OR PREPARED TO DO ANYTHING TO END YOUR LIFE?: NO
1. IN THE PAST MONTH, HAVE YOU WISHED YOU WERE DEAD OR WISHED YOU COULD GO TO SLEEP AND NOT WAKE UP?: NO

## 2024-10-15 ASSESSMENT — ACTIVITIES OF DAILY LIVING (ADL)
ADLS_ACUITY_SCORE: 38
ADLS_ACUITY_SCORE: 38

## 2024-10-15 NOTE — DISCHARGE INSTRUCTIONS
- Keep wound clean and dry.    - Wash with soap and water, apply antibiotic ointment like petroleum jelly/bacitracin, and then apply new dressing twice a day.    - Return to the ED for any signs of infection to include increasing redness, increasing swelling, increasing pain, discharge, fever, or any other new or worsening symptoms.   - Follow up with your provider in 7-10 days for staple removal.  Return to the ED if new or worsening symptoms.  -You were given 5 staples today.  -If you have any worsening of headache, abnormal balance, visual auditory changes, strokelike symptoms, muscle weakness or numbness, or any other concerning symptoms, please return to the ER for repeat evaluation.  -Tetanus shot is up-to-date.

## 2024-10-15 NOTE — ED TRIAGE NOTES
Pt here via private car.  Pt states that last night she had a seizure (known seizure hx) and hit the back of her head.  Pt states someone from PT said she needed to have her head looked at.  Bleeding is controlled as this happened 18+ hours ago.     Triage Assessment (Adult)       Row Name 10/15/24 1418          Triage Assessment    Airway WDL WDL        Respiratory WDL    Respiratory WDL WDL        Cardiac WDL    Cardiac WDL WDL        Peripheral/Neurovascular WDL    Peripheral Neurovascular WDL WDL        Cognitive/Neuro/Behavioral WDL    Cognitive/Neuro/Behavioral WDL WDL

## 2024-10-15 NOTE — ED PROVIDER NOTES
EMERGENCY DEPARTMENT ENCOUNTER      NAME: Sara Liriano  AGE: 49 year old female  YOB: 1975  MRN: [id number]  EVALUATION DATE & TIME: 10/15/2024  2:24 PM    PCP: Tammy Webber    ED PROVIDER: John Dillard PA-C       CHIEF COMPLAINT:  Chief Complaint   Patient presents with    Fall    Laceration         HPI  Sara Liriano is a pleasant 49 year old female with history of epilepsy presents here today for evaluation of scalp laceration.  Patient states that last night she had a seizure around 8 PM.  Patient does have a history of seizures.  Patient states that she sustained a laceration to the back of her head which initially did bleed but then stopped.  Patient was otherwise not having any headache or any other symptoms.  Patient today did have physical therapy and during the physical therapy session the physical therapist looked at patient's laceration and encouraged her to come to the ER.  Today, patient is not having any new symptoms.  No headache, lightheadedness, fever, chills, neck pain, or any other concerning symptoms.  Patient is here for laceration closure.      REVIEW OF SYSTEMS   Review of Systems  As above, otherwise ROS is unremarkable.      PAST MEDICAL HISTORY:  Past Medical History:   Diagnosis Date    Anorexia     Atypical depression     Hypothyroidism     Intracranial hemorrhage (H) 2018    Osteopenia     PTSD (post-traumatic stress disorder)     Seizure disorder (H)     TBI (traumatic brain injury) (H) 05/2018         PAST SURGICAL HISTORY:  Past Surgical History:   Procedure Laterality Date    CRANIOPLASTY  08/08/2018    CRANIOTOMY  05/2018    TRACHEOSTOMY  06/2018         CURRENT MEDICATIONS:    Current Outpatient Medications   Medication Instructions    acetaminophen (TYLENOL) 650 mg, Oral, DAILY, Do not exceed 4000 mg from all sources.    bisacodyl (DULCOLAX) 10 mg, Rectal, DAILY PRN    cetirizine (ZYRTEC) 10 mg, Oral, DAILY PRN    cyclobenzaprine  "(FLEXERIL) 5 mg, Oral, 2 TIMES DAILY PRN    HYDROmorphone (DILAUDID) 2 mg, Oral, EVERY 2 HOURS PRN, Medication provided by hospice    hyoscyamine 0.125 mg, EVERY 4 HOURS PRN    Lacosamide (VIMPAT) 100 mg, Oral, 2 TIMES DAILY    lamoTRIgine (LAMICTAL) 200 mg, Oral, 2 TIMES DAILY    levothyroxine (SYNTHROID/LEVOTHROID) 50 MCG tablet Take 1 tablet (50 mcg) at 7 am daily, 1 hour prior to eating and taking other medications    loperamide (IMODIUM) 2 mg, Oral, Take 4 mg with first loose stool, followed by 2 mg after each loose stool. Maximum 16 mg/day (8/mg OTC)    LORazepam (ATIVAN) 0.5 mg, Oral, EVERY 4 HOURS PRN, Medication provided by [facility]    magnesium hydroxide (MILK OF MAGNESIA) 400 MG/5ML suspension 30 mLs, Oral, DAILY PRN    melatonin 3 mg, Oral, AT BEDTIME PRN    mirtazapine (REMERON) 15 mg, AT BEDTIME PRN    omeprazole (PRILOSEC OTC) 20 mg, Oral, DAILY    omeprazole (PRILOSEC) 20 mg, Oral, DAILY    ondansetron (ZOFRAN ODT) 8 mg, Oral, EVERY 6 HOURS PRN    polyethylene glycol (MIRALAX) 17 GM/Dose powder 1 Capful, Oral, DAILY    prochlorperazine (COMPAZINE) 10 mg, Oral, EVERY 6 HOURS PRN, Medication provided by [facility]    senna-docusate (SENOKOT-S/PERICOLACE) 8.6-50 MG tablet 1 tablet, Oral, DAILY PRN    silver sulfADIAZINE (SILVADENE) 1 % external cream Topical, DAILY, Apply to burn daily and cover with non stick dressing until healed.  Apply only to burn area, not healthy skin.    traMADol (ULTRAM) 50 mg, Oral, EVERY 6 HOURS PRN         ALLERGIES:  Allergies   Allergen Reactions    Fluoxetine Other (See Comments) and Swelling     Blurred vision, heart racing, face swelling    Amoxicillin Other (See Comments)     \"Thrush\"         FAMILY HISTORY:  Family History   Problem Relation Age of Onset    Hypertension Mother     Heart Failure Father     Diabetes Father     Hypertension Sister          SOCIAL HISTORY:   Social History     Socioeconomic History    Marital status: Single   Tobacco Use    Smoking status: " Never     Passive exposure: Never    Smokeless tobacco: Never   Vaping Use    Vaping status: Never Used   Substance and Sexual Activity    Alcohol use: Yes     Comment: Alcoholic Drinks/day: Occassional    Drug use: No     Comment: Drug use: No    Sexual activity: Not Currently     Social Determinants of Health     Financial Resource Strain: Low Risk  (7/16/2024)    Financial Resource Strain     Within the past 12 months, have you or your family members you live with been unable to get utilities (heat, electricity) when it was really needed?: No   Food Insecurity: Low Risk  (7/16/2024)    Food Insecurity     Within the past 12 months, did you worry that your food would run out before you got money to buy more?: No     Within the past 12 months, did the food you bought just not last and you didn t have money to get more?: No   Transportation Needs: Low Risk  (7/16/2024)    Transportation Needs     Within the past 12 months, has lack of transportation kept you from medical appointments, getting your medicines, non-medical meetings or appointments, work, or from getting things that you need?: No   Physical Activity: Sufficiently Active (7/16/2024)    Exercise Vital Sign     Days of Exercise per Week: 7 days     Minutes of Exercise per Session: 60 min   Stress: Patient Declined (7/16/2024)    [employer] [place] [employer] - Occupational Stress Questionnaire     Feeling of Stress : Patient declined   Social Connections: Unknown (7/16/2024)    Social Connection and Isolation Panel [NHANES]     Frequency of Social Gatherings with Friends and Family: More than three times a week   Interpersonal Safety: Low Risk  (7/16/2024)    Interpersonal Safety     Do you feel physically and emotionally safe where you currently live?: Yes     Within the past 12 months, have you been hit, slapped, kicked or otherwise physically hurt by someone?: No     Within the past 12 months, have you been humiliated or emotionally abused in  "other ways by your partner or ex-partner?: No   Housing Stability: High Risk (7/16/2024)    Housing Stability     Do you have housing? : No     Are you worried about losing your housing?: No       ==================================================================================================================================    PHYSICAL EXAM    VITAL SIGNS: /88   Pulse 88   Temp 97.2  F (36.2  C) (Tympanic)   Resp 14   Ht 1.626 m (5' 4\")   Wt 54.4 kg (120 lb)   LMP 01/01/2013 (Within Years)   SpO2 99%   BMI 20.60 kg/m      Patient Vitals for the past 24 hrs:   BP Temp Temp src Pulse Resp SpO2 Height Weight   10/15/24 1422 125/88 97.2  F (36.2  C) Tympanic 88 14 99 % 1.626 m (5' 4\") 54.4 kg (120 lb)       Physical Exam  Constitutional:       Appearance: Normal appearance.   HENT:      Head:      Comments: Patient with a transverse 2.5 cm lacerations to the posterior scalp.  No skull crepitus.  No signs retained foreign body.  No active bleeding.  No surrounding erythema or warmth.  No purulent discharge.     Right Ear: Tympanic membrane, ear canal and external ear normal.      Left Ear: Tympanic membrane, ear canal and external ear normal.      Nose: Nose normal.      Mouth/Throat:      Mouth: Mucous membranes are moist.      Pharynx: Oropharynx is clear.   Eyes:      Extraocular Movements: Extraocular movements intact.      Conjunctiva/sclera: Conjunctivae normal.      Pupils: Pupils are equal, round, and reactive to light.   Cardiovascular:      Rate and Rhythm: Normal rate and regular rhythm.      Pulses: Normal pulses.      Heart sounds: Normal heart sounds.   Pulmonary:      Effort: Pulmonary effort is normal.      Breath sounds: Normal breath sounds.   Musculoskeletal:         General: Normal range of motion.      Cervical back: Normal range of motion and neck supple. No tenderness.      Comments: Ambulates with use of walker.   Skin:     General: Skin is warm and dry.      Capillary Refill: " Capillary refill takes less than 2 seconds.   Neurological:      General: No focal deficit present.      Mental Status: She is alert.      Comments: GCS 15.  Alert and orientated x 4.  Normal speech.  CN II-XII exam normal.  Normal visual fields. Strength and sensation grossly intact in all extremities.  Motor function grossly intact.   Psychiatric:         Mood and Affect: Mood normal.            ==================================================================================================================================    LABS & RADIOLOGY:  All pertinent labs reviewed and interpreted. Reviewed all pertinent imaging. Please see official radiology report.     No orders to display           PROCEDURES:   INFORMED CONSENT  Discussed the risks, benefits, alternatives, and the necessity of other members of the [facility] team participating in the procedure. All questions answered and informed consent obtained.    UNIVERSAL PROTOCOL  Procedural pause conducted to verify: Correct patient identity, procedure to be performed, and as applicable, correct side and site, correct patient position, and availability of implants, special equipment, or special requirements.    [facility]    -Laceration Repair    Date/Time: 10/15/2024 4:17 PM    Performed by: John Dillard PA-C  Authorized by: John Dillard PA-C    Risks, benefits and alternatives discussed.      ANESTHESIA (see MAR for exact dosages):     Anesthesia method:  None  LACERATION DETAILS     Location:  Scalp    Scalp location:  Occipital    Length (cm):  2.5    REPAIR TYPE:     Repair type:  Simple    EXPLORATION:     Hemostasis achieved with:  Direct pressure    Wound exploration: entire depth of wound probed and visualized      Wound extent: no foreign body and no underlying fracture      Contaminated: no      TREATMENT:     Area cleansed with:  Hibiclens    Amount of cleaning:  Standard    Irrigation solution:  Sterile  "saline    Irrigation volume:  300cc    Irrigation method:  Pressure wash    Visualized foreign bodies/material removed: no      SKIN REPAIR     Repair method:  Staples    Number of staples:  5    APPROXIMATION     Approximation:  Close    POST-PROCEDURE DETAILS     Dressing:  Open (no dressing)      PROCEDURE    Patient Tolerance:  Patient tolerated the procedure well with no immediate complications      ==================================================================================================================================    ED COURSE, MEDICAL DECISION MAKING, FINAL IMPRESSION AND PLAN:     Assessment / Plan:  1. Scalp laceration, initial encounter    2. Injury of head, initial encounter    3. History of epilepsy        The patient was interviewed and examined.  HPI and physical exam as below.  Differential diagnosis and MDM Key Documentation Elements as below.  Vitals, triage note, and nursing notes were reviewed.  /88   Pulse 88   Temp 97.2  F (36.2  C) (Tympanic)   Resp 14   Ht 1.626 m (5' 4\")   Wt 54.4 kg (120 lb)   LMP 01/01/2013 (Within Years)   SpO2 99%   BMI 20.60 kg/m      Differential includes but is not limited to laceration, concussion, intercranial bleed    2.5 cm laceration was irrigated and closed using 5 staples.  Patient with a normal neurological exam.  Patient was offered a head and cervical spine CT here in the ER but declined stating that she did not feel like she needed one.  Staples out in 7 to 10 days.  Wound care dressing precautions discussed.  Return to the ER as needed otherwise follow-up with primary care for staple removal.    Pertinent Labs & Imaging studies reviewed. (See chart for details)     Lab Results   Component Value Date    ABORH O POS 12/28/2023         Reassessments, Medications, Interventions, & Response to Treatments:  -as above    Medications given during today's ER visit:  Medications - No data to display    Consultations:  None    Decision " Rules, Medical Calculators, and Risk Stratification Tools:  None    MDM Key Documentation Elements for Patient's Evaluation:  Differential diagnosis to include high risk considerations: As above  Escalation to admission/observation considered: Admission/observation considered, but patient does not meet admission criteria  Discussions and management with other clinicians:    3a. Independent interpretation of testing performed by another health professional:  -No  3b. Discussion of management or test interpretation with another health professional: -No  Independent interpretation of tests:  Ordering and/or review of 0 test(s)  Discussion of test interpretations with radiology:  No  History obtained from source other than patient or assessment requiring an independent historian:  No  Review of non-ED/external records:  review of 1 records  Diagnostic tests considered but not ultimately performed/deferred:  -CT head  Prescription medications considered but not prescribed:  - antibiotics  Chronic conditions affecting care:  -Epilepsy  Care affected by social determinants of health:  -None    The patient's management involved:   - Decision regarding minor procedures (laceration repair)    A shared decision making model was used. Time was taken to answer all questions.  Patient and/or associated parties understood and were agreeable to treatment plan.  Plan and all results were discussed. Warning signs and close return precautions to return to the ED given. Copy of results given. Discharged in stable condition. Discharged with discharge instructions outlining plan for further care and follow up.      New prescriptions started at today's ER visit  Discharge Medication List as of 10/15/2024  4:07 PM          ==================================================================================================================================      Hernan VAZQUEZ PA-C, personally performed the services described in this  documentation, and it is both accurate and complete.       John Dillard PA-C  10/15/24 [id number]

## 2024-10-16 ENCOUNTER — PATIENT OUTREACH (OUTPATIENT)
Dept: CARE COORDINATION | Facility: CLINIC | Age: 49
End: 2024-10-16
Payer: OTHER GOVERNMENT

## 2024-10-16 ENCOUNTER — THERAPY VISIT (OUTPATIENT)
Dept: PHYSICAL THERAPY | Facility: OTHER | Age: 49
End: 2024-10-16
Attending: PHYSICIAN ASSISTANT
Payer: OTHER GOVERNMENT

## 2024-10-16 DIAGNOSIS — M25.552 HIP PAIN, LEFT: ICD-10-CM

## 2024-10-16 DIAGNOSIS — R53.81 PHYSICAL DECONDITIONING: Primary | ICD-10-CM

## 2024-10-16 PROCEDURE — 97113 AQUATIC THERAPY/EXERCISES: CPT | Mod: GP,CQ

## 2024-10-16 NOTE — PROGRESS NOTES
"Clinic Care Coordination Contact    Initiated phone contact with patient today.  Patient stating she is doing really well.  Patient reports living on her own in an independent apartment in the community.      Patient is continuing with PT and reports that she is now able to walk. She has occasional seizures that need medical attention at times-- but has been able to rehab her function and independence. Has also been working out at the [facility] regularly.     Spoke to patient's guardian/mother, Janki, today as well.  Janki is supportive of independent living for patient.  Janki is pursuing rescinding her Guardianship over patient as she feels being POA would be adequate for patient.      Janki stated that patient is considering moving out to [place] to live near her sister, Aydee.  Janki is considering this option as well.     Janki stated that they have gotten connected with the [facility] through [facility] [employer] and feel that they are now well connected with benefits and possible housing if patient were to need this.      Discussed Care Coordination and decided to close case for now.  Reiterrated to patient and parent that Care Coordination will remain available as needed.  They agreed to reach back for assist if needed.     ANDRE West on 10/16/2024 at 3:23 PM       "

## 2024-10-22 ENCOUNTER — THERAPY VISIT (OUTPATIENT)
Dept: PHYSICAL THERAPY | Facility: OTHER | Age: 49
End: 2024-10-22
Attending: PHYSICIAN ASSISTANT
Payer: OTHER GOVERNMENT

## 2024-10-22 DIAGNOSIS — M25.552 HIP PAIN, LEFT: ICD-10-CM

## 2024-10-22 DIAGNOSIS — R53.81 PHYSICAL DECONDITIONING: Primary | ICD-10-CM

## 2024-10-22 DIAGNOSIS — M62.81 MUSCLE WEAKNESS OF LOWER EXTREMITY: ICD-10-CM

## 2024-10-22 PROCEDURE — 97140 MANUAL THERAPY 1/> REGIONS: CPT | Mod: GP | Performed by: PHYSICAL THERAPIST

## 2024-10-22 PROCEDURE — 97110 THERAPEUTIC EXERCISES: CPT | Mod: GP | Performed by: PHYSICAL THERAPIST

## 2024-10-22 PROCEDURE — 97116 GAIT TRAINING THERAPY: CPT | Mod: GP | Performed by: PHYSICAL THERAPIST

## 2024-10-24 ENCOUNTER — THERAPY VISIT (OUTPATIENT)
Dept: PHYSICAL THERAPY | Facility: OTHER | Age: 49
End: 2024-10-24
Attending: PHYSICIAN ASSISTANT
Payer: OTHER GOVERNMENT

## 2024-10-24 DIAGNOSIS — M25.552 HIP PAIN, LEFT: ICD-10-CM

## 2024-10-24 DIAGNOSIS — R53.81 PHYSICAL DECONDITIONING: Primary | ICD-10-CM

## 2024-10-24 PROCEDURE — 97113 AQUATIC THERAPY/EXERCISES: CPT | Mod: GP,CQ

## 2024-10-29 ENCOUNTER — THERAPY VISIT (OUTPATIENT)
Dept: PHYSICAL THERAPY | Facility: OTHER | Age: 49
End: 2024-10-29
Attending: PHYSICIAN ASSISTANT
Payer: OTHER GOVERNMENT

## 2024-10-29 DIAGNOSIS — M25.552 HIP PAIN, LEFT: ICD-10-CM

## 2024-10-29 DIAGNOSIS — R53.81 PHYSICAL DECONDITIONING: Primary | ICD-10-CM

## 2024-10-29 DIAGNOSIS — M62.81 MUSCLE WEAKNESS OF LOWER EXTREMITY: ICD-10-CM

## 2024-10-29 PROCEDURE — 97110 THERAPEUTIC EXERCISES: CPT | Mod: GP | Performed by: PHYSICAL THERAPIST

## 2024-10-29 PROCEDURE — 97116 GAIT TRAINING THERAPY: CPT | Mod: GP | Performed by: PHYSICAL THERAPIST

## 2024-10-29 PROCEDURE — 97140 MANUAL THERAPY 1/> REGIONS: CPT | Mod: GP | Performed by: PHYSICAL THERAPIST

## 2024-10-31 ENCOUNTER — THERAPY VISIT (OUTPATIENT)
Dept: PHYSICAL THERAPY | Facility: OTHER | Age: 49
End: 2024-10-31
Attending: PHYSICIAN ASSISTANT
Payer: OTHER GOVERNMENT

## 2024-10-31 DIAGNOSIS — R53.81 PHYSICAL DECONDITIONING: Primary | ICD-10-CM

## 2024-10-31 DIAGNOSIS — M25.552 HIP PAIN, LEFT: ICD-10-CM

## 2024-10-31 DIAGNOSIS — M62.81 MUSCLE WEAKNESS OF LOWER EXTREMITY: ICD-10-CM

## 2024-10-31 PROCEDURE — 97113 AQUATIC THERAPY/EXERCISES: CPT | Mod: GP,CQ

## 2024-11-05 ENCOUNTER — OFFICE VISIT (OUTPATIENT)
Dept: FAMILY MEDICINE | Facility: OTHER | Age: 49
End: 2024-11-05
Attending: PHYSICIAN ASSISTANT
Payer: OTHER GOVERNMENT

## 2024-11-05 ENCOUNTER — PATIENT OUTREACH (OUTPATIENT)
Dept: CARE COORDINATION | Facility: CLINIC | Age: 49
End: 2024-11-05

## 2024-11-05 ENCOUNTER — HOSPITAL ENCOUNTER (OUTPATIENT)
Dept: GENERAL RADIOLOGY | Facility: OTHER | Age: 49
Discharge: HOME | End: 2024-11-05
Attending: PHYSICIAN ASSISTANT
Payer: OTHER GOVERNMENT

## 2024-11-05 VITALS
SYSTOLIC BLOOD PRESSURE: 100 MMHG | TEMPERATURE: 97.6 F | WEIGHT: 107 LBS | BODY MASS INDEX: 18.37 KG/M2 | DIASTOLIC BLOOD PRESSURE: 64 MMHG | HEART RATE: 80 BPM | RESPIRATION RATE: 20 BRPM

## 2024-11-05 DIAGNOSIS — R56.9 SEIZURES (H): ICD-10-CM

## 2024-11-05 DIAGNOSIS — H91.93 BILATERAL CHANGE IN HEARING: ICD-10-CM

## 2024-11-05 DIAGNOSIS — Z78.9: ICD-10-CM

## 2024-11-05 DIAGNOSIS — Z12.31 ENCOUNTER FOR SCREENING MAMMOGRAM FOR BREAST CANCER: ICD-10-CM

## 2024-11-05 DIAGNOSIS — S32.010G COMPRESSION FRACTURE OF L1 VERTEBRA WITH DELAYED HEALING, SUBSEQUENT ENCOUNTER: Primary | ICD-10-CM

## 2024-11-05 PROCEDURE — G2211 COMPLEX E/M VISIT ADD ON: HCPCS | Performed by: PHYSICIAN ASSISTANT

## 2024-11-05 PROCEDURE — 72100 X-RAY EXAM L-S SPINE 2/3 VWS: CPT

## 2024-11-05 PROCEDURE — 99214 OFFICE O/P EST MOD 30 MIN: CPT | Performed by: PHYSICIAN ASSISTANT

## 2024-11-05 RX ORDER — CYCLOBENZAPRINE HCL 5 MG
5 TABLET ORAL 3 TIMES DAILY PRN
Qty: 90 TABLET | Refills: 10 | Status: SHIPPED | OUTPATIENT
Start: 2024-11-05

## 2024-11-05 RX ORDER — LACOSAMIDE 150 MG/1
1 TABLET ORAL
COMMUNITY
Start: 2024-09-20

## 2024-11-05 ASSESSMENT — PAIN SCALES - GENERAL: PAINLEVEL_OUTOF10: SEVERE PAIN (6)

## 2024-11-05 NOTE — NURSING NOTE
"Patient presents to the clinic for follow up.    FOOD SECURITY SCREENING QUESTIONS:    The next two questions are to help us understand your food security.  If you are feeling you need any assistance in this area, we have resources available to support you today.    Hunger Vital Signs:  Within the past 12 months we worried whether our food would run out before we got money to buy more. Never  Within the past 12 months the food we bought just didn't last and we didn't have money to get more. Never    Advance Care Directive on file? yes  Advance Care Directive provided to patient? Declined.      Chief Complaint   Patient presents with    Clinic Care Coordination - Follow-up       Initial /64 (BP Location: Right arm, Patient Position: Sitting, Cuff Size: Child)   Pulse 80   Temp 97.6  F (36.4  C) (Tympanic)   Resp 20   Wt 48.5 kg (107 lb)   LMP 01/01/2013 (Within Years)   BMI 18.37 kg/m   Estimated body mass index is 18.37 kg/m  as calculated from the following:    Height as of 10/15/24: 1.626 m (5' 4\").    Weight as of this encounter: 48.5 kg (107 lb).  Medication Reconciliation: complete        Barb Gabriel LPN     "

## 2024-11-05 NOTE — PROGRESS NOTES
Clinic Care Coordination Contact    Received referral from Tammy Webber/CARLOS to offer Care Coordination assist to discuss transportation resources.     Initiated a phone call to patient.  No answer, so left brief voice message encouraging patient to call back re: transportation needs.     Provided Care Coordination in recent past but case closed as goals were met.  Will remain in identified status at this time.     Will await her call back.     ANDRE West on 11/5/2024 at 10:45 AM

## 2024-11-05 NOTE — PROGRESS NOTES
Clinic Care Coordination Contact  Received call back from patient.     Patient states she uses [employer] and is wondering about other transportation options in the community.      Introduced her to [employer] option through [employer].  She agreed to give them a call to learn about the service and how it can help her with her lifestyle needs.     [place] and to stay in this community at this time.     No ongoing Care Coordination needs at this time.     ANDRE West on 11/5/2024 at 3:04 PM

## 2024-11-05 NOTE — PROGRESS NOTES
Assessment & Plan       ICD-10-CM    1. Compression fracture of L1 vertebra with delayed healing, subsequent encounter  S32.010G cyclobenzaprine (FLEXERIL) 5 MG tablet     XR Lumbar Spine 2/3 Views      2. Encounter for screening mammogram for breast cancer  Z12.31 MA Screen Bilateral w/Joni      3. Bilateral change in hearing  H91.93 Adult Audiology  Referral      4. Seizures (H)  R56.9 Primary Care - Care Coordination Referral      5. Difficulty performing activities involved in using transportation  Z78.9 Primary Care - Care Coordination Referral        Compression fracture, over did it while working out at the gym and physical therapy, increased muscular spasm over the last few days. Short term increase Flexeril to 5 mg TID dosing. Aware of side effects (sedation, etc.). She does not drive, operate machinery nor consume alcohol with use of medication.   Due for screening mammogram, order placed, she will be contacted to schedule. Declines breast exam in the clinic today. No red flag symptoms (breast pain, lumps, skin changes or discharge).   Changes to hearing - muffling and popping sounds. Reassurance provided no signs of infection today. Referral to audiology placed.   Seizures, no seizures since seen last. Ongoing close follow up with neurology. She has concerns about medication dose of Vipmat, I encouraged her to discuss this with neurology provider.   Difficulties with transport - she desires to continue with [facility] (car/van service due to mobility versus bus) and needs and update to help with this. She is also interested in other local resources for transport, especially, as she also travels to [place] for neurology care.    See Patient Instructions    The longitudinal plan of care for the diagnosis(es)/condition(s) as documented were addressed during this visit. Due to the added complexity in care, I will continue to support Sara in the subsequent management and with ongoing  continuity of care.    No follow-ups on file.    Subjective   Sara is a 49 year old, presenting for the following health issues:  Clinic Care Coordination - Follow-up        11/5/2024     8:51 AM   Additional Questions   Roomed by angelique QUINTANA     Sara presents to the clinic today to discuss the following:  Neurologic, follows with Dr. Chin at [facility]note 3[facility] in [place]. Believes she met with him at the end of September. Her Vipmat was increased from 100 mg BID to 150 mg BID for better seizure control as she was having breakthrough seizures, which improved after her dose increase. However, she has noted increased dizziness and weakness after her dose increase and tapered herself back down to 100 mg BID, she has not had any seizures since doing this, but is wondering if this is OK that she self decreased her dose. She has not contacted neurology since this timeframe, unsure of when her next visit is. Dizziness did improve with dose decrease.   She does also have increased tremors and twitches with Vipmat, she is wondering if this is normal.   Bilateral ear popping - especially in the morning and while on the phone/in conversation, hearing is also slightly more muffled bilaterally over the last few months or so. She stopped her oral antihistamine and nasal corticosteroid as she is unsure of how effective these were. She has also tried to clean her ears without any production of wax. She has not had a hearing examination. She declines tinnitus in regards to ringing/buzzing. No falls or injuries.   Back and hip pain, history of fall last year - history of hip replacement/surgery and continues to follow closely with orthopedics, working hard on calcium and vitamin D intake to help with bone density. She does have intermittent hip and low back pain, primarily at level of L1 at previous compression fracture site. Pain is worse with walking and lifting, better with rest. She declines any falls, injuries or trauma  recently. No loss or bowel or bladder function, nor saddle anesthesia. Pain is currently a 2/10, however, at night she does have increased muscle spasms and is wondering if her Flexeril can be increased to three tablets daily (divided dosing). No weakness.   Transportation - she may need a letter to continue receiving rides/care through [facility]. She receives rides via van/car and is hoping to continue this, as she cannot climb to ride the bus and finds this very uncomfortable for seating as well.     Review of Systems  Constitutional, HEENT, cardiovascular, pulmonary, GI, , musculoskeletal, neuro, skin, endocrine and psych systems are negative, except as otherwise noted.        Objective    /64 (BP Location: Right arm, Patient Position: Sitting, Cuff Size: Child)   Pulse 80   Temp 97.6  F (36.4  C) (Tympanic)   Resp 20   Wt 48.5 kg (107 lb)   LMP 01/01/2013 (Within Years)   BMI 18.37 kg/m    Body mass index is 18.37 kg/m .  Physical Exam   GENERAL: alert and no distress  EYES: Eyes grossly normal to inspection  HENT: ear canals and TM's normal, nose and mouth without ulcers or lesions  NECK: no adenopathy, no asymmetry, masses, or scars  RESP: lungs clear to auscultation - no rales, rhonchi or wheezes  CV: regular rate and rhythm, normal S1 S2, no S3 or S4, no murmur, click or rub, no peripheral edema  MS: ambulates with 4-wheeled walker  Thoracic/Lumbar Spine:  Inspection:    Lordosis: Normal   Kyphosis: increased  Tenderness: left para lumbar muscles, right para lumbar muscles, T12-L2 parathoracic and lumbar musculatures  ROM: near full flexion, extension, lateral bending and rotation  Strength: gastrocsoleus   5/5, hamstrings  5/5, quadriceps  5/5, tibialis anterior  5/5, peroneals  5/5  Special Test: negative straight leg raises    Patient knows own name, what time of day it is and what building we are in. CNII-XII intact. Gets in and out of chair unassisted.     SKIN: no suspicious  lesions or rashes  NEURO: Normal strength and tone, mentation intact and speech normal  PSYCH: mentation appears normal, affect normal/bright  LYMPH: normal ant/post cervical, supraclavicular nodes    Results for orders placed or performed in visit on 11/05/24   XR Lumbar Spine 2/3 Views     Status: None    Narrative    XR LUMBAR SPINE 2/3 VIEWS    HISTORY: Compression fracture of L1 vertebra with delayed healing,  subsequent encounter .    COMPARISON: 3/4/24.    TECHNIQUE: 3 views of the lumbosacral spine.    FINDINGS:    Osteopenia. 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  Chronic L3 and L4 vertebral body height loss. Levoscoliosis.      Impression    IMPRESSION:     Unchanged appearance of the lumbar spine, including severe L1  vertebral body height loss associated with gibbus deformity and  retropulsion. Associated severe spinal stenosis is better seen on  prior MRI.      LALIT LINTON MD         SYSTEM ID:  [id number]     Signed Electronically by: Tammy Webber PA-C

## 2024-11-06 ENCOUNTER — THERAPY VISIT (OUTPATIENT)
Dept: PHYSICAL THERAPY | Facility: OTHER | Age: 49
End: 2024-11-06
Attending: PHYSICIAN ASSISTANT
Payer: OTHER GOVERNMENT

## 2024-11-06 DIAGNOSIS — Z87.81 S/P LEFT HIP FRACTURE: ICD-10-CM

## 2024-11-06 DIAGNOSIS — M25.552 HIP PAIN, LEFT: ICD-10-CM

## 2024-11-06 DIAGNOSIS — R53.81 PHYSICAL DECONDITIONING: Primary | ICD-10-CM

## 2024-11-06 DIAGNOSIS — M62.81 MUSCLE WEAKNESS OF LOWER EXTREMITY: ICD-10-CM

## 2024-11-06 PROCEDURE — 97140 MANUAL THERAPY 1/> REGIONS: CPT | Mod: GP | Performed by: PHYSICAL THERAPIST

## 2024-11-06 PROCEDURE — 97110 THERAPEUTIC EXERCISES: CPT | Mod: GP | Performed by: PHYSICAL THERAPIST

## 2024-11-07 ENCOUNTER — TELEPHONE (OUTPATIENT)
Dept: FAMILY MEDICINE | Facility: OTHER | Age: 49
End: 2024-11-07
Payer: OTHER GOVERNMENT

## 2024-11-14 ENCOUNTER — THERAPY VISIT (OUTPATIENT)
Dept: PHYSICAL THERAPY | Facility: OTHER | Age: 49
End: 2024-11-14
Attending: PHYSICIAN ASSISTANT
Payer: OTHER GOVERNMENT

## 2024-11-14 DIAGNOSIS — R53.81 PHYSICAL DECONDITIONING: ICD-10-CM

## 2024-11-14 DIAGNOSIS — M25.552 HIP PAIN, LEFT: Primary | ICD-10-CM

## 2024-11-14 DIAGNOSIS — M62.81 MUSCLE WEAKNESS OF LOWER EXTREMITY: ICD-10-CM

## 2024-11-14 PROCEDURE — 97530 THERAPEUTIC ACTIVITIES: CPT | Mod: GP,CQ

## 2024-11-14 PROCEDURE — 97110 THERAPEUTIC EXERCISES: CPT | Mod: GP,CQ

## 2024-11-14 PROCEDURE — 97112 NEUROMUSCULAR REEDUCATION: CPT | Mod: GP,CQ

## 2024-11-21 ENCOUNTER — THERAPY VISIT (OUTPATIENT)
Dept: PHYSICAL THERAPY | Facility: OTHER | Age: 49
End: 2024-11-21
Attending: PHYSICIAN ASSISTANT
Payer: OTHER GOVERNMENT

## 2024-11-21 DIAGNOSIS — M62.81 MUSCLE WEAKNESS OF LOWER EXTREMITY: ICD-10-CM

## 2024-11-21 DIAGNOSIS — R53.81 PHYSICAL DECONDITIONING: ICD-10-CM

## 2024-11-21 DIAGNOSIS — M25.552 HIP PAIN, LEFT: Primary | ICD-10-CM

## 2024-11-21 PROCEDURE — 97140 MANUAL THERAPY 1/> REGIONS: CPT | Mod: GP | Performed by: PHYSICAL THERAPIST

## 2024-11-21 PROCEDURE — 97110 THERAPEUTIC EXERCISES: CPT | Mod: GP | Performed by: PHYSICAL THERAPIST

## 2024-11-21 NOTE — PROGRESS NOTES
11/21/24 0500   Appointment Info   Signing clinician's name / credentials Roni Mchugh DPT   Total/Authorized Visits 1 of 10   Visits Used 21   Medical Diagnosis Physical deconditioning (R53.81), Age-related osteoporosis with current pathological fracture of vertebra with routine healing (M80.08XD), Compression fracture of L1 vertebra with delayed healing, subsequent encounter (S32.010G), Closed fracture of neck of left femur, initial encounter (H) (S72.002A)   PT Tx Diagnosis Hip and core weakness, falls risk, degenerative changes of lumbar spine, decreased gait and ambulation tolerance and safety   Progress Note/Certification   Onset of illness/injury or Date of Surgery   (Patient reports falling in January)   Therapy Frequency 1-2 times a week   Predicted Duration 12 weeks   Progress Note Completed Date 11/21/24   GOALS   PT Goals 2;3   PT Goal 1   Goal Identifier Home exercise program   Goal Description Patient will maintain home exercise program twice a week to improve mobility and improve balance to reduce falls risk and improve overall mobility and ambulation   Goal Progress Patient is a lot of extra exercise at home and [employer]   Target Date 11/14/24   Date Met 11/21/24   PT Goal 2   Goal Identifier Stairs   Goal Description Patient will walk up stairs with bilateral and/or unilateral handrail to get into her apartment with no need for assist from PT or caregiver   Goal Progress Uses ramp and stairs to get in and out of home   Target Date 09/19/24   Date Met 11/21/24   PT Goal 3   Goal Identifier Walking   Goal Description Patient will walk with assistive device either walker or cane up to 500 feet for community ambulation   Goal Progress Has been will demonstrate much improved gait technique and tolerance   Target Date 10/17/24   Date Met 11/21/24   Subjective Report   Subjective Report Still exercising at the  regularly, has been using walker for ambulation and feels much more comfortable.  She still  has occasional seizures and falls but is more confident with getting to the ground to help reduce damage when she feels a seizure coming on   PT Modalities   PT Modalities Iontophoresis   Treatment Interventions (PT)   Interventions Gait Training;Manual Therapy;Neuromuscular Re-education   Therapeutic Procedure/Exercise   Therapeutic Procedures: strength, endurance, ROM, flexibility minutes (96711) 25   Ther Proc 1 Stepper 5 minutes, sidestepping along railing, lower trunk rotation, feet on stability ball bridge and feet on stability ball hamstring curls with manual resistance, prone hip extension and side-lying clam assisted clam 2 x 10 to 15 each   Skilled Intervention vc/tc for correct tech and positioning   Patient Response/Progress Minimal increase in back pain   Manual Therapy   Manual Therapy: Mobilization, MFR, MLD, friction massage minutes (15784) 15   Manual Therapy 1 - Details prone STM/MFR and PA gr 1/2/3 T/L spine.  As well as hip IR and ER mobilizations in prone and hip abduction/external rotation assistance in supine and side-lying   Aquatic Therapy   Aquatic Therapy 2   (step up on 1st step LLE-6reps with frequent vc/tc for technique and wt shifting forward, lunges x 5reps BLE focus on posture)   Plan   Plan for next session Work on hip abduction strength   Comments   Comments Patient is doing very well and for next few appointments discharge will be appropriate   Total Session Time   Timed Code Treatment Minutes 40   Total Treatment Time (sum of timed and untimed services) 40

## 2024-12-02 ENCOUNTER — PATIENT OUTREACH (OUTPATIENT)
Dept: CARE COORDINATION | Facility: CLINIC | Age: 49
End: 2024-12-02
Payer: OTHER GOVERNMENT

## 2024-12-02 NOTE — PROGRESS NOTES
Clinic Care Coordination Contact    Reviewed patient's medical chart.  Attending PT sessions for hip pain and notes state that patient is improving.    No contact initiated by patient since we last spoke re: her status on 11/5/24.     Will graduate patient off Care Coordination at this time.  Will remain available to assist with needs as referred by patient  and/or providers.     ANDRE West on 12/2/2024 at 11:46 AM

## 2024-12-03 ENCOUNTER — THERAPY VISIT (OUTPATIENT)
Dept: PHYSICAL THERAPY | Facility: OTHER | Age: 49
End: 2024-12-03
Attending: PHYSICIAN ASSISTANT
Payer: OTHER GOVERNMENT

## 2024-12-03 DIAGNOSIS — M25.552 HIP PAIN, LEFT: Primary | ICD-10-CM

## 2024-12-03 DIAGNOSIS — M62.81 MUSCLE WEAKNESS OF LOWER EXTREMITY: ICD-10-CM

## 2024-12-03 DIAGNOSIS — R53.81 PHYSICAL DECONDITIONING: ICD-10-CM

## 2024-12-03 PROCEDURE — 97140 MANUAL THERAPY 1/> REGIONS: CPT | Mod: GP | Performed by: PHYSICAL THERAPIST

## 2024-12-03 PROCEDURE — 97110 THERAPEUTIC EXERCISES: CPT | Mod: GP | Performed by: PHYSICAL THERAPIST

## 2024-12-27 ENCOUNTER — HOSPITAL ENCOUNTER (OUTPATIENT)
Dept: MAMMOGRAPHY | Facility: OTHER | Age: 49
Discharge: HOME | End: 2024-12-27
Attending: PHYSICIAN ASSISTANT | Admitting: PHYSICIAN ASSISTANT
Payer: OTHER GOVERNMENT

## 2024-12-27 DIAGNOSIS — Z12.31 ENCOUNTER FOR SCREENING MAMMOGRAM FOR BREAST CANCER: ICD-10-CM

## 2024-12-27 PROCEDURE — 77063 BREAST TOMOSYNTHESIS BI: CPT

## 2025-01-14 ENCOUNTER — OFFICE VISIT (OUTPATIENT)
Dept: OTOLARYNGOLOGY | Facility: OTHER | Age: 50
End: 2025-01-14
Attending: OTOLARYNGOLOGY
Payer: COMMERCIAL

## 2025-01-14 DIAGNOSIS — H93.19 TINNITUS DUE TO MYOKYMIA OF MIDDLE EAR MUSCULATURE: Primary | ICD-10-CM

## 2025-01-14 PROCEDURE — G0463 HOSPITAL OUTPT CLINIC VISIT: HCPCS

## 2025-01-14 NOTE — NURSING NOTE
Patient here to see ENT for hearing evaluation.   Zulema Mcduffie LPN ..........1/14/2025 1:14 PM

## 2025-01-27 DIAGNOSIS — G40.909 SEIZURE DISORDER (H): Primary | ICD-10-CM

## 2025-01-29 RX ORDER — LACOSAMIDE 150 MG/1
150 TABLET ORAL
OUTPATIENT
Start: 2025-01-29

## 2025-01-29 NOTE — TELEPHONE ENCOUNTER
Per 8/30/24 ER office visit note:     She is following up with her neurologist at [facility] on September 20th. She also had a referral for neurology within the [facility] system placed by her primary care provider in July.    VIMPAT 100mg BID     Note added in comments to pharmacy to send request to patient's neurologist at [facility]. Dolores Saenz RN on 1/29/2025 at 12:44 PM      Requested Prescriptions   Pending Prescriptions Disp Refills    lacosamide (VIMPAT) 150 MG TABS tablet       Sig: Take 1 tablet (150 mg) by mouth 2 times daily.       There is no refill protocol information for this order

## 2025-01-30 RX ORDER — LACOSAMIDE 100 MG/1
100 TABLET ORAL 2 TIMES DAILY
Qty: 60 TABLET | Refills: 0 | Status: SHIPPED | OUTPATIENT
Start: 2025-01-30

## 2025-01-30 NOTE — TELEPHONE ENCOUNTER
"Patient states she is currently on vimpat 100 mg twice daily. She sees her Neurologist at [facility] next week. Patient states when she tried the 150 mg dose she \"couldn't even move, I was stuck on the floor, it's that awful.\" Patient would like one month supply until she can connect with her Neurologist.   Dolores Saenz RN on 1/30/2025 at 12:54 PM    "

## 2025-01-31 NOTE — ADDENDUM NOTE
Addended by: YOU KATZ on: 7/17/2024 02:38 PM     Modules accepted: Orders    
Patient Needs Assistance to Leave Residence...

## 2025-02-04 NOTE — PROGRESS NOTES
document embedded image                                   [facility]                                                                                                                                         Patient Name: Sara Liriano   Address: [address] [place] [address]   YOB: 1975   [place]IE [place]AMME[place] [address]   MR Number: [id number]   Phone: [phone]  PCP: NONE           Appointment Date: 01/14/25  Visit Provider: Luca Houston MD    cc: ~    ENT Progress Note        Intake  Visit Reasons: Hearing Evaluation    HPI  History of Present Illness  Chief complaint:  Hearing distortion, thumping noises in ear    History   The patient is a 49-year-old female who I had previously seen this 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  She presents today exam of muffled or distorted hearing.    Exam   The external auditory canals and TMs are clear bilaterally   The remainder of the head neck exam is unremarkable   Audiogram reveals normal pure tone and speech reception thresholds she was normal discrimination scores and tympanograms.    Allergies    nystatin Allergy (Severe, Verified 09/20/24 13:48)  Swelling    PFSH  PFSH:     Past Medical History: (Updated 01/14/25 @ 14:14 by CECILE RICHARDS, AMBROCIO)    Burst fracture of cervical vertebra  Premature menopause  Major depressive disorder, recurrent severe without psychotic features  Macrocytic anemia  Laxative abuse  Severe malnutrition  Hepatitis  SAH (subarachnoid hemorrhage)  SDH (subdural hematoma)  Post-traumatic epilepsy, non-refractory  Osteoporosis  Generalized anxiety disorder  History of traumatic brain injury  Passive suicidal ideations  Hypothyroid  Anorexia nervosa      Past Surgical History: (Reviewed 12/28/23 @ 11:45 by Evelyne Mueller RN)    History of cranioplasty  8/8/18 with Dr. 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                Luca Houston MD    Filed: 01/15/25 1650      <Electronically signed by Luca Houston MD> 01/15/25 1708

## 2025-03-06 DIAGNOSIS — R56.9 SEIZURES (H): ICD-10-CM

## 2025-03-07 NOTE — TELEPHONE ENCOUNTER
[id number] sent Rx request for the following:      Requested Prescriptions   Pending Prescriptions Disp Refills    Lacosamide (VIMPAT) 100 MG TABS tablet 180 tablet 3     Sig: Take 1 tablet (100 mg) by mouth 2 times daily.       There is no refill protocol information for this order        Last Prescription Date:   1/30/25  Last Fill Qty/Refills:         60, R-0    Last Office Visit:              11/5/24   Future Office visit:           None    Unable to complete prescription refill per RN Medication Refill Policy. Kayley Thomas RN .............. 3/7/2025  2:17 PM

## 2025-03-08 RX ORDER — LACOSAMIDE 100 MG/1
100 TABLET ORAL 2 TIMES DAILY
Qty: 180 TABLET | Refills: 1 | Status: SHIPPED | OUTPATIENT
Start: 2025-03-08

## 2025-03-10 ENCOUNTER — TELEPHONE (OUTPATIENT)
Dept: FAMILY MEDICINE | Facility: OTHER | Age: 50
End: 2025-03-10
Payer: COMMERCIAL

## 2025-03-10 NOTE — TELEPHONE ENCOUNTER
Patient placed with Tammy Webber PA-C on Wednesday arrive at 1:45.      Barb Gabriel LPN 3/10/2025 1:47 PM

## 2025-03-12 ENCOUNTER — OFFICE VISIT (OUTPATIENT)
Dept: FAMILY MEDICINE | Facility: OTHER | Age: 50
End: 2025-03-12
Attending: PHYSICIAN ASSISTANT
Payer: COMMERCIAL

## 2025-03-12 VITALS
RESPIRATION RATE: 20 BRPM | TEMPERATURE: 98.2 F | BODY MASS INDEX: 18.39 KG/M2 | HEART RATE: 96 BPM | SYSTOLIC BLOOD PRESSURE: 126 MMHG | WEIGHT: 107.13 LBS | DIASTOLIC BLOOD PRESSURE: 84 MMHG

## 2025-03-12 DIAGNOSIS — Z02.89 ENCOUNTER FOR COMPLETION OF FORM WITH PATIENT: ICD-10-CM

## 2025-03-12 DIAGNOSIS — G40.909 SEIZURE DISORDER (H): ICD-10-CM

## 2025-03-12 DIAGNOSIS — Z87.820 H/O TRAUMATIC BRAIN INJURY: Primary | ICD-10-CM

## 2025-03-12 ASSESSMENT — PATIENT HEALTH QUESTIONNAIRE - PHQ9
10. IF YOU CHECKED OFF ANY PROBLEMS, HOW DIFFICULT HAVE THESE PROBLEMS MADE IT FOR YOU TO DO YOUR WORK, TAKE CARE OF THINGS AT HOME, OR GET ALONG WITH OTHER PEOPLE: NOT DIFFICULT AT ALL
SUM OF ALL RESPONSES TO PHQ QUESTIONS 1-9: 0
SUM OF ALL RESPONSES TO PHQ QUESTIONS 1-9: 0

## 2025-03-12 ASSESSMENT — ANXIETY QUESTIONNAIRES
1. FEELING NERVOUS, ANXIOUS, OR ON EDGE: NOT AT ALL
7. FEELING AFRAID AS IF SOMETHING AWFUL MIGHT HAPPEN: NOT AT ALL
7. FEELING AFRAID AS IF SOMETHING AWFUL MIGHT HAPPEN: NOT AT ALL
8. IF YOU CHECKED OFF ANY PROBLEMS, HOW DIFFICULT HAVE THESE MADE IT FOR YOU TO DO YOUR WORK, TAKE CARE OF THINGS AT HOME, OR GET ALONG WITH OTHER PEOPLE?: NOT DIFFICULT AT ALL
GAD7 TOTAL SCORE: 0
4. TROUBLE RELAXING: NOT AT ALL
2. NOT BEING ABLE TO STOP OR CONTROL WORRYING: NOT AT ALL
IF YOU CHECKED OFF ANY PROBLEMS ON THIS QUESTIONNAIRE, HOW DIFFICULT HAVE THESE PROBLEMS MADE IT FOR YOU TO DO YOUR WORK, TAKE CARE OF THINGS AT HOME, OR GET ALONG WITH OTHER PEOPLE: NOT DIFFICULT AT ALL
3. WORRYING TOO MUCH ABOUT DIFFERENT THINGS: NOT AT ALL
5. BEING SO RESTLESS THAT IT IS HARD TO SIT STILL: NOT AT ALL
6. BECOMING EASILY ANNOYED OR IRRITABLE: NOT AT ALL
GAD7 TOTAL SCORE: 0
GAD7 TOTAL SCORE: 0

## 2025-03-12 ASSESSMENT — PAIN SCALES - GENERAL: PAINLEVEL_OUTOF10: MILD PAIN (2)

## 2025-03-12 NOTE — LETTER
March 12, 2025      Sara DOWLING Armendariz  [address]  [place] [address]    To Whom It May Concern,     I am writing in regards to petition for guardianship to be terminated and restore/return all rights and robertson of the person (Sara) subject to guardianship. Guardianship was initially appointed on 9/20/2018 and since this timeframe, Sara has made slow and steady strides towards gaining her independence. Sara has overcome many feats since sustaining a traumatic brain injury in 2018. She is able to pay her own bills, lives independently and is able to make her own important medical decisions without requiring assistance. She is able to coordinate her own transportation via the public transport resources available locally. She is no longer considered an incapacitated person and is able to provide all aspects of her care on her own. Sara has supportive relationships with family and friends, as well as her medical team. She is able to meet the following personal needs and more: medical care, nutrition, clothing, shelter, safety, etc. She would benefit from the ability to have her current guardianship terminated and all rights restored to her.     She desires as well to serve her community via volunteer services. In particular, the [facility] has been a major part of her ongoing strides towards self improvement. She initially going to the [facility] when she was in limited mobility status (such as requiring a wheelchair intermittently) to now being able to ambulate and attend classes on her own, as well as many other independent activities. She has gained a new sense of confidence since starting her time at the [facility]. She has formed many meaningful relationships via this resource.     Sincerely,        Tammy Webber PA-C

## 2025-03-12 NOTE — PROGRESS NOTES
Assessment & Plan       ICD-10-CM    1. H/O traumatic brain injury  Z87.820       2. Seizure disorder (H)  G40.909       3. Encounter for completion of form with patient  Z02.89         History of traumatic brain injury in September 2018, since this timeframe, Sara has had a slow road to recovery/returning to baseline. Over the last 2 years that I have provided care for her, she has made substantial strides. She is now living on her own in her trailer/mobile home. She is able to pay her own bills (retired  with adequate income), navigates travel to run errands and attend medical appointments via local/public transport system (does not drive due to seizure history), able to care for herself (bathing, clothing, cooking, medical care, etc.) and exhibits the ability to make her own medical decisions without assistance. I believe Sara would benefit from termination of guardianship (mother is current guardian and has also written a letter, along with Sara's sister, expressing their agreement to terminate guardianship as well due to Sara's stability). Letter was written with this information. As well, Sara desires to give back to her community, as noted below, the [facility] has been life-changing for Sara over the last 3-years. I believe she would make an excellent volunteer, letter written to echo that from Cash Mchugh DPT from November 2024 with this information. Copies of both letters provided to Sara.   Seizure disorder - under good control/management. Refilled Vimpat earlier this week. PDMP stable. Aware of return precautions.   Completion of form, see #1.     See Patient Instructions    30 minutes spent directly face to face on care.     The longitudinal plan of care for the diagnosis(es)/condition(s) as documented were addressed during this visit. Due to the added complexity in care, I will continue to support Sara in the subsequent management and with ongoing continuity of care.    No follow-ups on  file.    Subjective   Sara is a 49 year old, presenting for the following health issues:  Letter Out      3/12/2025    12:32 PM   Additional Questions   Roomed by angelique grider lpn     History of Present Illness       Reason for visit:  Letter      Sara presents to the clinic today to discuss seizure disorder and completion of forms.     Seizures: recently refilled Vimpat. No seizures in a prolonged period of time. Feels stable on current medication dose. Not driving due to seizure history, as she is concerned if she has a seizure while driving, she could injure herself and/or those around her.     Guardianship - mother has been legal guardian since TBI (traumatic brain injury) on 9/20/18. Sara and family (mother and sister), feel she is ready to petition for removal or guardianship. Sara is able to care for herself in regards to cooking, cleaning, bathing, administering medications, attending appointments and arranging transport on her own. She lives independently in her mobile/trailer home and feels while her recovery from her TBI has been slow over the last 7-years, this is the best she has ever felt. Initially, she was dependent on a wheelchair, due to weakness and is now able to ambulate on her own without assistance. She does occasionally use a wheeled walker if she is on icy/uneven surfaces, but this is rare. She has gained both physical and mental strength via physical therapy. She is very appreciative of the ongoing support from Cash Mchugh DPT. She also is a very active member at the [employer] in [facility], she states that joining the [employer] saved her life. She remembers going there while in a wheelchair, to now being able to exercise and walk on her own. She desires to volunteer at the [employer] to give back to the community. She states spending time with the school aged children brings her joey that she has not had in years and it is a learning experience that she is ever grateful for.     Review of  Systems  Constitutional, HEENT, cardiovascular, pulmonary, GI, , musculoskeletal, neuro, skin, endocrine and psych systems are negative, except as otherwise noted.     Objective    /84 (BP Location: Right arm, Patient Position: Sitting, Cuff Size: Adult Regular)   Pulse 96   Temp 98.2  F (36.8  C) (Tympanic)   Resp 20   Wt 48.6 kg (107 lb 2 oz)   LMP 01/01/2013 (Within Years)   BMI 18.39 kg/m    Body mass index is 18.39 kg/m .  Physical Exam   GENERAL: alert and no distress  EYES: Eyes grossly normal to inspection  RESP: lungs clear to auscultation - no rales, rhonchi or wheezes  CV: regular rate and rhythm, normal S1 S2, no S3 or S4, no murmur, click or rub, no peripheral edema  MS: no gross musculoskeletal defects noted, no edema  NEURO: Normal strength and tone, mentation intact and speech normal  PSYCH: mentation appears normal, affect normal/bright    Signed Electronically by: Tammy Webber PA-C

## 2025-03-12 NOTE — NURSING NOTE
"Patient presents to the clinic for letter to discontinue guardianship.    FOOD SECURITY SCREENING QUESTIONS:    The next two questions are to help us understand your food security.  If you are feeling you need any assistance in this area, we have resources available to support you today.    Hunger Vital Signs:  Within the past 12 months we worried whether our food would run out before we got money to buy more. Never  Within the past 12 months the food we bought just didn't last and we didn't have money to get more. Never    Advance Care Directive on file? no  Advance Care Directive provided to patient? Declined.      Chief Complaint   Patient presents with    Letter Out       Initial /84 (BP Location: Right arm, Patient Position: Sitting, Cuff Size: Adult Regular)   Pulse 96   Temp 98.2  F (36.8  C) (Tympanic)   Resp 20   Wt 48.6 kg (107 lb 2 oz)   LMP 01/01/2013 (Within Years)   BMI 18.39 kg/m   Estimated body mass index is 18.39 kg/m  as calculated from the following:    Height as of 10/15/24: 1.626 m (5' 4\").    Weight as of this encounter: 48.6 kg (107 lb 2 oz).  Medication Reconciliation: complete        Barb Gabriel LPN     "

## 2025-03-12 NOTE — LETTER
March 12, 2025      Sara DOWLING Armendariz  [address]  [facility] [id number]    To Whom It May Concern,     Sara is qualified to be considered for volunteer services that are fitting to her current and improving physical and mental capabilities.     She has shown vast improvement and overcome many feats in the last few years and she desires to give back to her community via volunteer work. She is aware of her physical and medical limitation and continues to thrive.       Sincerely,        Tammy Webber PA-C

## 2025-05-23 DIAGNOSIS — E06.3 HYPOTHYROIDISM DUE TO HASHIMOTO'S THYROIDITIS: ICD-10-CM

## 2025-05-26 RX ORDER — LEVOTHYROXINE SODIUM 50 UG/1
TABLET ORAL
Qty: 90 TABLET | Refills: 3 | Status: SHIPPED | OUTPATIENT
Start: 2025-05-26

## 2025-05-30 ENCOUNTER — HOSPITAL ENCOUNTER (OUTPATIENT)
Dept: MRI IMAGING | Facility: OTHER | Age: 50
Discharge: HOME | End: 2025-05-30
Attending: STUDENT IN AN ORGANIZED HEALTH CARE EDUCATION/TRAINING PROGRAM | Admitting: RADIOLOGY
Payer: COMMERCIAL

## 2025-05-30 DIAGNOSIS — M54.6 THORACIC SPINE PAIN: ICD-10-CM

## 2025-05-30 PROCEDURE — 72146 MRI CHEST SPINE W/O DYE: CPT

## 2025-05-30 PROCEDURE — 72146 MRI CHEST SPINE W/O DYE: CPT | Mod: 26 | Performed by: RADIOLOGY

## 2025-06-05 DIAGNOSIS — F51.01 PRIMARY INSOMNIA: ICD-10-CM

## 2025-06-16 ENCOUNTER — PATIENT OUTREACH (OUTPATIENT)
Dept: CARE COORDINATION | Facility: CLINIC | Age: 50
End: 2025-06-16
Payer: COMMERCIAL

## 2025-08-21 ENCOUNTER — RESULTS FOLLOW-UP (OUTPATIENT)
Dept: FAMILY MEDICINE | Facility: OTHER | Age: 50
End: 2025-08-21

## 2025-08-21 ENCOUNTER — OFFICE VISIT (OUTPATIENT)
Dept: FAMILY MEDICINE | Facility: OTHER | Age: 50
End: 2025-08-21
Attending: NURSE PRACTITIONER
Payer: COMMERCIAL

## 2025-08-21 VITALS
OXYGEN SATURATION: 96 % | SYSTOLIC BLOOD PRESSURE: 128 MMHG | RESPIRATION RATE: 16 BRPM | BODY MASS INDEX: 18.4 KG/M2 | WEIGHT: 107.2 LBS | DIASTOLIC BLOOD PRESSURE: 92 MMHG | TEMPERATURE: 99.3 F | HEART RATE: 92 BPM

## 2025-08-21 DIAGNOSIS — N93.9 VAGINAL BLEEDING: ICD-10-CM

## 2025-08-21 DIAGNOSIS — R63.0 ANOREXIA: ICD-10-CM

## 2025-08-21 DIAGNOSIS — R31.1 BENIGN ESSENTIAL MICROSCOPIC HEMATURIA: Primary | ICD-10-CM

## 2025-08-21 LAB
ALBUMIN SERPL BCG-MCNC: 4.3 G/DL (ref 3.5–5.2)
ALBUMIN UR-MCNC: NEGATIVE MG/DL
ALP SERPL-CCNC: 97 U/L (ref 40–150)
ALT SERPL W P-5'-P-CCNC: 36 U/L (ref 0–50)
ANION GAP SERPL CALCULATED.3IONS-SCNC: 9 MMOL/L (ref 7–15)
APPEARANCE UR: CLEAR
AST SERPL W P-5'-P-CCNC: 44 U/L (ref 0–45)
BACTERIA #/AREA URNS HPF: ABNORMAL /HPF
BACTERIAL VAGINOSIS VAG-IMP: NEGATIVE
BASOPHILS # BLD AUTO: 0.09 10E3/UL (ref 0–0.2)
BASOPHILS NFR BLD AUTO: 1.5 %
BILIRUB SERPL-MCNC: 0.2 MG/DL
BILIRUB UR QL STRIP: NEGATIVE
BUN SERPL-MCNC: 5.1 MG/DL (ref 6–20)
CALCIUM SERPL-MCNC: 8.9 MG/DL (ref 8.8–10.4)
CANDIDA DNA VAG QL NAA+PROBE: NOT DETECTED
CANDIDA GLABRATA / CANDIDA KRUSEI DNA: NOT DETECTED
CHLORIDE SERPL-SCNC: 106 MMOL/L (ref 98–107)
CHOLEST SERPL-MCNC: 204 MG/DL
COLOR UR AUTO: ABNORMAL
CREAT SERPL-MCNC: 0.68 MG/DL (ref 0.51–0.95)
EGFRCR SERPLBLD CKD-EPI 2021: >90 ML/MIN/1.73M2
EOSINOPHIL # BLD AUTO: 0.12 10E3/UL (ref 0–0.7)
EOSINOPHIL NFR BLD AUTO: 2 %
ERYTHROCYTE [DISTWIDTH] IN BLOOD BY AUTOMATED COUNT: 12.3 % (ref 10–15)
FASTING STATUS PATIENT QL REPORTED: ABNORMAL
FASTING STATUS PATIENT QL REPORTED: ABNORMAL
GLUCOSE SERPL-MCNC: 90 MG/DL (ref 70–99)
GLUCOSE UR STRIP-MCNC: NEGATIVE MG/DL
HCG UR QL: NEGATIVE
HCO3 SERPL-SCNC: 27 MMOL/L (ref 22–29)
HCT VFR BLD AUTO: 40.7 % (ref 35–47)
HDLC SERPL-MCNC: 79 MG/DL
HGB BLD-MCNC: 13.7 G/DL (ref 11.7–15.7)
HGB UR QL STRIP: ABNORMAL
IMM GRANULOCYTES # BLD: <0.03 10E3/UL
IMM GRANULOCYTES NFR BLD: 0 %
KETONES UR STRIP-MCNC: NEGATIVE MG/DL
LDLC SERPL CALC-MCNC: 103 MG/DL
LEUKOCYTE ESTERASE UR QL STRIP: NEGATIVE
LYMPHOCYTES # BLD AUTO: 2.32 10E3/UL (ref 0.8–5.3)
LYMPHOCYTES NFR BLD AUTO: 38.6 %
MCH RBC QN AUTO: 32.5 PG (ref 26.5–33)
MCHC RBC AUTO-ENTMCNC: 33.7 G/DL (ref 31.5–36.5)
MCV RBC AUTO: 96.4 FL (ref 78–100)
MONOCYTES # BLD AUTO: 0.5 10E3/UL (ref 0–1.3)
MONOCYTES NFR BLD AUTO: 8.3 %
MUCOUS THREADS #/AREA URNS LPF: PRESENT /LPF
NEUTROPHILS # BLD AUTO: 2.98 10E3/UL (ref 1.6–8.3)
NEUTROPHILS NFR BLD AUTO: 49.6 %
NITRATE UR QL: NEGATIVE
NONHDLC SERPL-MCNC: 125 MG/DL
NRBC # BLD AUTO: <0.03 10E3/UL
NRBC BLD AUTO-RTO: 0 /100
PH UR STRIP: 8 [PH] (ref 5–9)
PLATELET # BLD AUTO: 331 10E3/UL (ref 150–450)
POTASSIUM SERPL-SCNC: 4.7 MMOL/L (ref 3.4–5.3)
PROT SERPL-MCNC: 6.7 G/DL (ref 6.4–8.3)
RBC # BLD AUTO: 4.22 10E6/UL (ref 3.8–5.2)
RBC URINE: 1 /HPF
SODIUM SERPL-SCNC: 142 MMOL/L (ref 135–145)
SP GR UR STRIP: 1.01 (ref 1–1.03)
T VAGINALIS DNA VAG QL NAA+PROBE: NOT DETECTED
TRIGL SERPL-MCNC: 110 MG/DL
TSH SERPL DL<=0.005 MIU/L-ACNC: 0.95 UIU/ML (ref 0.3–4.2)
UROBILINOGEN UR STRIP-MCNC: NORMAL MG/DL
WBC # BLD AUTO: 6.01 10E3/UL (ref 4–11)
WBC URINE: 1 /HPF

## 2025-08-21 PROCEDURE — 81025 URINE PREGNANCY TEST: CPT | Mod: ZL

## 2025-08-21 PROCEDURE — 84443 ASSAY THYROID STIM HORMONE: CPT | Mod: ZL

## 2025-08-21 PROCEDURE — 36415 COLL VENOUS BLD VENIPUNCTURE: CPT | Mod: ZL

## 2025-08-21 PROCEDURE — 85004 AUTOMATED DIFF WBC COUNT: CPT | Mod: ZL

## 2025-08-21 PROCEDURE — 81515 NFCT DS BV&VAGINITIS DNA ALG: CPT | Mod: ZL

## 2025-08-21 PROCEDURE — 84155 ASSAY OF PROTEIN SERUM: CPT | Mod: ZL

## 2025-08-21 PROCEDURE — 80061 LIPID PANEL: CPT | Mod: ZL

## 2025-08-21 PROCEDURE — 81003 URINALYSIS AUTO W/O SCOPE: CPT | Mod: ZL

## 2025-08-21 ASSESSMENT — ENCOUNTER SYMPTOMS
FLANK PAIN: 0
HEMATURIA: 1
DYSURIA: 0
FREQUENCY: 1
FEVER: 0
ABDOMINAL PAIN: 0
CHILLS: 0

## 2025-08-21 ASSESSMENT — PAIN SCALES - GENERAL: PAINLEVEL_OUTOF10: NO PAIN (0)

## 2025-08-23 LAB — BACTERIA UR CULT: NO GROWTH

## (undated) RX ORDER — ACETAMINOPHEN 325 MG/1
TABLET ORAL
Status: DISPENSED
Start: 2024-08-30

## (undated) RX ORDER — LIDOCAINE HYDROCHLORIDE 10 MG/ML
INJECTION, SOLUTION INFILTRATION; PERINEURAL
Status: DISPENSED
Start: 2023-08-29

## (undated) RX ORDER — HYDROCODONE BITARTRATE AND ACETAMINOPHEN 5; 325 MG/1; MG/1
TABLET ORAL
Status: DISPENSED
Start: 2023-11-08

## (undated) RX ORDER — PROPOFOL 10 MG/ML
INJECTION, EMULSION INTRAVENOUS
Status: DISPENSED
Start: 2018-05-16

## (undated) RX ORDER — ONDANSETRON 2 MG/ML
INJECTION INTRAMUSCULAR; INTRAVENOUS
Status: DISPENSED
Start: 2024-02-07

## (undated) RX ORDER — ACETAMINOPHEN 325 MG/1
TABLET ORAL
Status: DISPENSED
Start: 2024-03-04

## (undated) RX ORDER — ONDANSETRON 2 MG/ML
INJECTION INTRAMUSCULAR; INTRAVENOUS
Status: DISPENSED
Start: 2024-08-30

## (undated) RX ORDER — DEXTROSE MONOHYDRATE 25 G/50ML
INJECTION, SOLUTION INTRAVENOUS
Status: DISPENSED
Start: 2018-05-15

## (undated) RX ORDER — FENTANYL CITRATE 50 UG/ML
INJECTION, SOLUTION INTRAMUSCULAR; INTRAVENOUS
Status: DISPENSED
Start: 2018-05-16

## (undated) RX ORDER — FENTANYL CITRATE 50 UG/ML
INJECTION, SOLUTION INTRAMUSCULAR; INTRAVENOUS
Status: DISPENSED
Start: 2023-12-28

## (undated) RX ORDER — LEVETIRACETAM 500 MG/5ML
INJECTION, SOLUTION, CONCENTRATE INTRAVENOUS
Status: DISPENSED
Start: 2018-05-16

## (undated) RX ORDER — HYDROMORPHONE HYDROCHLORIDE 1 MG/ML
INJECTION, SOLUTION INTRAMUSCULAR; INTRAVENOUS; SUBCUTANEOUS
Status: DISPENSED
Start: 2023-12-28